# Patient Record
Sex: MALE | Race: ASIAN | Employment: FULL TIME | URBAN - METROPOLITAN AREA
[De-identification: names, ages, dates, MRNs, and addresses within clinical notes are randomized per-mention and may not be internally consistent; named-entity substitution may affect disease eponyms.]

---

## 2017-01-30 DIAGNOSIS — I10 ESSENTIAL HYPERTENSION: ICD-10-CM

## 2017-01-30 RX ORDER — LOSARTAN POTASSIUM 50 MG/1
50 TABLET ORAL DAILY
Qty: 90 TAB | Refills: 3 | Status: SHIPPED | OUTPATIENT
Start: 2017-01-30 | End: 2018-02-01 | Stop reason: SDUPTHER

## 2017-03-08 ENCOUNTER — OFFICE VISIT (OUTPATIENT)
Dept: INTERNAL MEDICINE CLINIC | Age: 47
End: 2017-03-08

## 2017-03-08 VITALS
HEART RATE: 78 BPM | SYSTOLIC BLOOD PRESSURE: 129 MMHG | TEMPERATURE: 98 F | RESPIRATION RATE: 18 BRPM | WEIGHT: 192.2 LBS | OXYGEN SATURATION: 95 % | DIASTOLIC BLOOD PRESSURE: 80 MMHG | HEIGHT: 66 IN | BODY MASS INDEX: 30.89 KG/M2

## 2017-03-08 DIAGNOSIS — E78.00 HYPERCHOLESTEREMIA: Primary | ICD-10-CM

## 2017-03-08 DIAGNOSIS — K21.9 GASTROESOPHAGEAL REFLUX DISEASE WITHOUT ESOPHAGITIS: ICD-10-CM

## 2017-03-08 NOTE — MR AVS SNAPSHOT
Visit Information Date & Time Provider Department Dept. Phone Encounter #  
 3/8/2017  8:45 AM Tatyana Harrington, [address] [phone] [id number] Follow-up Instructions Return in about 6 months (around 9/8/2017) for HTN. Follow-up and Disposition History Upcoming Health Maintenance Date Due DTaP/Tdap/Td series (2 - Td) 3/3/2026 Allergies as of 3/8/2017  Review Complete On: 3/8/2017 By: Tatyana Harrington MD  
 No Known Allergies Current Immunizations  Reviewed on 3/8/2017 Name Date Influenza Vaccine 9/15/2016, 10/1/2015 Influenza Vaccine PF 9/29/2014  9:24 AM  
 TD Vaccine 9/5/2010  1:00 AM  
 Tdap 3/3/2016 Reviewed by Tatyana Harrington MD on 3/8/2017 at  9:18 AM  
You Were Diagnosed With   
  
 Codes Comments Hypercholesteremia    -  Primary ICD-10-CM: E78.00 ICD-9-CM: 272.0 Gastroesophageal reflux disease without esophagitis     ICD-10-CM: K21.9 ICD-9-CM: 530.81 Vitals BP Pulse Temp Resp Height(growth percentile) Weight(growth percentile) 129/80 (BP 1 Location: Left arm, BP Patient Position: Sitting) 78 98 °F (36.7 °C) (Oral) 18 5' 6\" (1.676 m) 192 lb 3.2 oz (87.2 kg) SpO2 BMI Smoking Status 95% 31.02 kg/m2 Never Smoker Vitals History BMI and BSA Data Body Mass Index Body Surface Area 31.02 kg/m 2 2.02 m 2 Preferred Pharmacy Pharmacy Name Phone [id number] [phone] Your Updated Medication List  
  
   
This list is accurate as of: 3/8/17  9:24 AM.  Always use your most recent med list.  
  
  
  
  
 losartan 50 mg tablet Commonly known as:  COZAAR Take 1 Tab by mouth daily. MULTIPLE VITAMINS PO Take  by mouth. One daily RETIN-A 0.01 % topical gel Generic drug:  tretinoin Apply  to affected area nightly. Follow-up Instructions Return in about 6 months (around 9/8/2017) for HTN. Patient Instructions Learning About Healthy Weight What is a healthy weight? A healthy weight is the weight at which you feel good about yourself and have energy for work and play. It's also one that lowers your risk for health problems. What can you do to stay at a healthy weight? It can be hard to stay at a healthy weight, especially when fast food, vending-machine snacks, and processed foods are so easy to find. And with your busy lifestyle, activity may be low on your list of things to do. But staying at a healthy weight may be easier than you think. Here are some dos and don'ts for staying at a healthy weight: 
Do eat healthy foods The kinds of foods you eat have a big impact on both your weight and your health. Reaching and staying at a healthy weight is not about going on a diet. It's about making healthier food choices every day and changing your diet for good. Healthy eating means eating a variety of foods so that you get all the nutrients you need. Your body needs protein, carbohydrate, and fats for energy. They keep your heart beating, your brain active, and your muscles working. On most days, try to eat from each food group. This means eating a variety of: · Whole grains, such as whole wheat breads and pastas. · Fruits and vegetables. · Dairy products, such as low-fat milk, yogurt, and cheese. · Lean proteins, such as all types of fish, chicken without the skin, and beans. Don't have too much or too little of one thing. All foods, if eaten in moderation, can be part of healthy eating. Even sweets can be okay. If your favorite foods are high in fat, salt, sugar, or calories, limit how often you eat them. Eat smaller servings, or look for healthy substitutes. Do watch what you eat Many people eat more than their bodies need.  Part of staying at a healthy weight means learning how much food you really need from day to day and not eating more than that. Even with healthy foods, eating too much can make you gain weight. Having a well-balanced diet means that you eat enough, but not too much, and that your food gives you the nutrients you need to stay healthy. So listen to your body. Eat when you're hungry. Stop when you feel satisfied. It's a good idea to have healthy snacks ready for when you get hungry. Keep healthy snacks with you at work, in your car, and at home. If you have a healthy snack easily available, you'll be less likely to pick a candy bar or bag of chips from a vending machine instead. Some healthy snacks you might want to keep on hand are fruit, low-fat yogurt, string cheese, low-fat microwave popcorn, raisins and other dried fruit, nuts, whole wheat crackers, pretzels, carrots, celery sticks, and broccoli. Do some physical activity A big part of reaching and staying at a healthy weight is being active. When you're active, you burn calories. This makes it easier to reach and stay at a healthy weight. When you're active on a regular basis, your body burns more calories, even when you're at rest. Being active helps you lose fat and build lean muscle. Try to be active for at least 1 hour every day. This may sound like a lot, but it's okay to be active in smaller blocks of time that add up to 1 hour a day. Any activity that makes your heart beat faster and keeps it there for a while counts. A brisk walk, run, or swim will get your heart beating faster. So will climbing stairs, shooting baskets, or cycling. Even some household chores like vacuuming and mowing the lawn will get your heart rate up. Pick activities that you enjoyones that make your heart beat faster, your muscles stronger, and your muscles and joints more flexible. If you find more than one thing you like doing, do them all. You don't have to do the same thing every day. Don't diet Diets don't work. Diets are temporary. Because you give up so much when you diet, you may be hungry and think about food all the time. And after you stop dieting, you also may overeat to make up for what you missed. Most people who diet end up gaining back the pounds they lostand more. Remember that healthy bodies come in lots of shapes and sizes. Everyone can get healthier by eating better and being more active. Where can you learn more? Go to http://erlin-neha.info/. Enter 449 1460 in the search box to learn more about \"Learning About Healthy Weight. \" Current as of: February 16, 2016 Content Version: 11.1 © 4802-9172 [employer]. Care instructions adapted under license by [employer] (which disclaims liability or warranty for this information). If you have questions about a medical condition or this instruction, always ask your healthcare professional. [place] [address] any warranty or liability for your use of this information. Introducing \[facility]\"" & HEALTH SERVICES! Dear Fred Malloy: Thank you for requesting a [email or web address] account. Our records indicate that you already have an active [email or web address] account. You can access your account anytime at [email or web address] Did you know that you can access your hospital and ER discharge instructions at any time in [email or web address]? You can also review all of your test results from your hospital stay or ER visit. Additional Information If you have questions, please visit the Frequently Asked Questions section of the [email or web address] website at [email or web address] Remember, [email or web address] is NOT to be used for urgent needs. For medical emergencies, dial 911. Now available from your iPhone and Android! Please provide this summary of care documentation to your next provider. Your primary care clinician is listed as [place].  If you have any questions after today's visit, please call [phone].

## 2017-03-08 NOTE — PROGRESS NOTES
SUBJECTIVE:   Mr. Nathan Kevin is a 55 y.o. male who is here for follow up of routine medical issues. Chief Complaint   Patient presents with    Hypertension    Follow-up     pt here today for 6 month f.u    Pelvic Pain     pt c.o pain to R side of pelvic x 1 month--osteoarthritis, pt did get a cortisone shot a year ago, but pain is returning       \"I just moved to a new house, doing a lot of labor--unpacking. \"  \"I don't know if I pulled something. I do have osteoarthritis there. \"  Worse in morning, worse with arising after being seated for a while. Takes ibuprofen 1-2 times a day. At this time, he is otherwise doing well and has brought no other complaints to my attention today. For a list of the medical issues addressed today, see the assessment and plan below. PMH:   Past Medical History:   Diagnosis Date    Bursitis of shoulder     right; \"comes and goes\"    GERD (gastroesophageal reflux disease)     Hypercholesteremia     Osteoarthritis        PSH:   Past Surgical History:   Procedure Laterality Date    HX ACL RECONSTRUCTION      HX HERNIA REPAIR      HX SHOULDER ARTHROSCOPY  3/01/2012    Right shoulder     HX SHOULDER ARTHROSCOPY  04/2014    R shoulder        All: He has No Known Allergies. MEDS:   Current Outpatient Prescriptions   Medication Sig    losartan (COZAAR) 50 mg tablet Take 1 Tab by mouth daily.  tretinoin (RETIN-A) 0.01 % topical gel Apply  to affected area nightly.  MULTIVITAMINS (MULTIPLE VITAMINS PO) Take  by mouth. One daily      No current facility-administered medications for this visit. FH: Father has a hematologic disorder. Mother has sciatica. Two sisters alive and well. SH: . He reports that he has never smoked. He does not have any smokeless tobacco history on file. He reports that he does not drink alcohol. ROS: See above; Complete ROS otherwise negative.      OBJECTIVE:   Vitals:   Visit Vitals    /80 (BP 1 Location: Left arm, BP Patient Position: Sitting)    Pulse 78    Temp 98 °F (36.7 °C) (Oral)    Resp 18    Ht 5' 6\" (1.676 m)    Wt 192 lb 3.2 oz (87.2 kg)    SpO2 95%    BMI 31.02 kg/m2      Gen: Pleasant 55 y.o.  male in NAD. HEENT: PERRLA. EOMI. OP moist and pink. Neck: Supple. No LAD. HEART: RRR, No M/G/R.    LUNGS: CTAB No W/R. ABDOMEN: S, NT, ND, BS+. EXTREMITIES: Warm. No C/C/E.  MUSCULOSKELETAL: Normal ROM, muscle strength 5/5 all groups. R knee crepitus with flexion and extension. NEURO: Alert and oriented x 3. Cranial nerves grossly intact. No focal sensory or motor deficits noted. SKIN: Warm. Dry. No rashes or other lesions noted. ASSESSMENT/ PLAN:   1. R groin pain: Likely pulled muscle. No bulge appreciated. For now, rest and NSAIDs. If fails to improve or worsens, let us know. 2. Hypertension: Controlled. 3. Lipid screening / Hypercholesterolemia: Due for recheck. - METABOLIC PANEL, COMPREHENSIVE  - LIPID PANEL  4. GERD (gastroesophageal reflux disease): Currently quiescent. Off meds. 5. Weight: Diet and exercise. I have reviewed the patient's medications and risks/side effects/benefits were discussed. Diagnosis(-es) explained to patient and questions answered. Literature provided where appropriate. Follow-up Disposition:  Return in about 6 months (around 9/8/2017) for HTN.

## 2017-03-08 NOTE — PATIENT INSTRUCTIONS
Learning About Healthy Weight  What is a healthy weight? A healthy weight is the weight at which you feel good about yourself and have energy for work and play. It's also one that lowers your risk for health problems. What can you do to stay at a healthy weight? It can be hard to stay at a healthy weight, especially when fast food, vending-machine snacks, and processed foods are so easy to find. And with your busy lifestyle, activity may be low on your list of things to do. But staying at a healthy weight may be easier than you think. Here are some dos and don'ts for staying at a healthy weight:  Do eat healthy foods  The kinds of foods you eat have a big impact on both your weight and your health. Reaching and staying at a healthy weight is not about going on a diet. It's about making healthier food choices every day and changing your diet for good. Healthy eating means eating a variety of foods so that you get all the nutrients you need. Your body needs protein, carbohydrate, and fats for energy. They keep your heart beating, your brain active, and your muscles working. On most days, try to eat from each food group. This means eating a variety of:  · Whole grains, such as whole wheat breads and pastas. · Fruits and vegetables. · Dairy products, such as low-fat milk, yogurt, and cheese. · Lean proteins, such as all types of fish, chicken without the skin, and beans. Don't have too much or too little of one thing. All foods, if eaten in moderation, can be part of healthy eating. Even sweets can be okay. If your favorite foods are high in fat, salt, sugar, or calories, limit how often you eat them. Eat smaller servings, or look for healthy substitutes. Do watch what you eat  Many people eat more than their bodies need. Part of staying at a healthy weight means learning how much food you really need from day to day and not eating more than that.  Even with healthy foods, eating too much can make you gain weight. Having a well-balanced diet means that you eat enough, but not too much, and that your food gives you the nutrients you need to stay healthy. So listen to your body. Eat when you're hungry. Stop when you feel satisfied. It's a good idea to have healthy snacks ready for when you get hungry. Keep healthy snacks with you at work, in your car, and at home. If you have a healthy snack easily available, you'll be less likely to pick a candy bar or bag of chips from a vending machine instead. Some healthy snacks you might want to keep on hand are fruit, low-fat yogurt, string cheese, low-fat microwave popcorn, raisins and other dried fruit, nuts, whole wheat crackers, pretzels, carrots, celery sticks, and broccoli. Do some physical activity  A big part of reaching and staying at a healthy weight is being active. When you're active, you burn calories. This makes it easier to reach and stay at a healthy weight. When you're active on a regular basis, your body burns more calories, even when you're at rest. Being active helps you lose fat and build lean muscle. Try to be active for at least 1 hour every day. This may sound like a lot, but it's okay to be active in smaller blocks of time that add up to 1 hour a day. Any activity that makes your heart beat faster and keeps it there for a while counts. A brisk walk, run, or swim will get your heart beating faster. So will climbing stairs, shooting baskets, or cycling. Even some household chores like vacuuming and mowing the lawn will get your heart rate up. Pick activities that you enjoyones that make your heart beat faster, your muscles stronger, and your muscles and joints more flexible. If you find more than one thing you like doing, do them all. You don't have to do the same thing every day. Don't diet  Diets don't work. Diets are temporary. Because you give up so much when you diet, you may be hungry and think about food all the time.  And after you stop dieting, you also may overeat to make up for what you missed. Most people who diet end up gaining back the pounds they lostand more. Remember that healthy bodies come in lots of shapes and sizes. Everyone can get healthier by eating better and being more active. Where can you learn more? Go to http://erlin-neha.info/. Enter 738 5060 in the search box to learn more about \"Learning About Healthy Weight. \"  Current as of: February 16, 2016  Content Version: 11.1  © 3470-7159 [employer]. Care instructions adapted under license by [employer] (which disclaims liability or warranty for this information). If you have questions about a medical condition or this instruction, always ask your healthcare professional. [address] any warranty or liability for your use of this information.

## 2017-03-08 NOTE — PROGRESS NOTES
1. Have you been to the ER, urgent care clinic since your last visit? Hospitalized since your last visit?no    2. Have you seen or consulted any other health care providers outside of the [address] [place] [address] since your last visit? Include any pap smears or colon screening.  no

## 2017-09-08 ENCOUNTER — OFFICE VISIT (OUTPATIENT)
Dept: INTERNAL MEDICINE CLINIC | Age: 47
End: 2017-09-08

## 2017-09-08 VITALS
RESPIRATION RATE: 18 BRPM | DIASTOLIC BLOOD PRESSURE: 71 MMHG | HEART RATE: 76 BPM | BODY MASS INDEX: 30.15 KG/M2 | HEIGHT: 66 IN | SYSTOLIC BLOOD PRESSURE: 116 MMHG | OXYGEN SATURATION: 97 % | TEMPERATURE: 97.2 F | WEIGHT: 187.6 LBS

## 2017-09-08 DIAGNOSIS — E78.00 HYPERCHOLESTEREMIA: ICD-10-CM

## 2017-09-08 DIAGNOSIS — M54.12 CERVICAL RADICULOPATHY: Primary | ICD-10-CM

## 2017-09-08 DIAGNOSIS — I10 ESSENTIAL HYPERTENSION: ICD-10-CM

## 2017-09-08 DIAGNOSIS — K21.9 GASTROESOPHAGEAL REFLUX DISEASE WITHOUT ESOPHAGITIS: ICD-10-CM

## 2017-09-08 NOTE — PATIENT INSTRUCTIONS
Pinched Nerve in the Neck: Care Instructions  Your Care Instructions  A pinched nerve in the neck happens when a vertebra or disc in the upper part of your spine is damaged. This damage can happen because of an injury. Or it can just happen with age. The changes caused by the damage may put pressure on a nearby nerve root, pinching it. This causes symptoms such as sharp pain in your neck, shoulder, arm, or back. You may also have tingling or numbness. Sometimes it makes your arm weaker. The symptoms are usually worse when you turn your head or strain your neck. For many people, the symptoms get better over time and finally go away. Early treatment usually includes medicines for pain and swelling. Sometimes physical therapy and special exercises may help. Follow-up care is a key part of your treatment and safety. Be sure to make and go to all appointments, and call your doctor if you are having problems. It's also a good idea to know your test results and keep a list of the medicines you take. How can you care for yourself at home? · Be safe with medicines. Read and follow all instructions on the label. ¨ If the doctor gave you a prescription medicine for pain, take it as prescribed. ¨ If you are not taking a prescription pain medicine, ask your doctor if you can take an over-the-counter medicine. · Try using a heating pad on a low or medium setting for 15 to 20 minutes every 2 or 3 hours. Try a warm shower in place of one session with the heating pad. You can also buy single-use heat wraps that last up to 8 hours. · You can also try an ice pack for 10 to 15 minutes every 2 to 3 hours. There isn't strong evidence that either heat or ice will help. But you can try them to see if they help you. · Don't spend too long in one position. Take short breaks to move around and change positions. · Wear a seat belt and shoulder harness when you are in a car.   · Sleep with a pillow under your head and neck that keeps your neck straight. · If you were given a neck brace (cervical collar) to limit neck motion, wear it as instructed for as many days as your doctor tells you to. Do not wear it longer than you were told to. Wearing a brace for too long can lead to neck stiffness and can weaken the neck muscles. · Follow your doctor's instructions for gentle neck-stretching exercises. · Do not smoke. Smoking can slow healing of your discs. If you need help quitting, talk to your doctor about stop-smoking programs and medicines. These can increase your chances of quitting for good. · Avoid strenuous work or exercise until your doctor says it is okay. When should you call for help? Call 911 anytime you think you may need emergency care. For example, call if:  · You are unable to move an arm or a leg at all. Call your doctor now or seek immediate medical care if:  · You have new or worse symptoms in your arms, legs, chest, belly, or buttocks. Symptoms may include:  ¨ Numbness or tingling. ¨ Weakness. ¨ Pain. · You lose bladder or bowel control. Watch closely for changes in your health, and be sure to contact your doctor if:  · You are not getting better as expected. Where can you learn more? Go to http://relin-neha.info/. Enter D487 in the search box to learn more about \"Pinched Nerve in the Neck: Care Instructions. \"  Current as of: March 21, 2017  Content Version: 11.3  © 5917-7280 [employer]. Care instructions adapted under license by [employer] (which disclaims liability or warranty for this information). If you have questions about a medical condition or this instruction, always ask your healthcare professional. [place] [address] any warranty or liability for your use of this information.

## 2017-09-08 NOTE — PROGRESS NOTES
1. Have you been to the ER, urgent care clinic since your last visit? Hospitalized since your last visit?no    2. Have you seen or consulted any other health care providers outside of the [address] [place] [address] since your last visit? Include any pap smears or colon screening.  no

## 2017-09-08 NOTE — PROGRESS NOTES
SUBJECTIVE:   Mr. Kam Silver is a 55 y.o. male who is here for follow up of routine medical issues. Chief Complaint   Patient presents with    Complete Physical     pt states that he is here today for annual check up    Immunization/Injection     pt will get a flu shot at a later date       L arm \"gets tingly, from shoulder all the way to the wrist; on its own. \"  Comes and goes, \"sporadic. \"    At this time, he is otherwise doing well and has brought no other complaints to my attention today. For a list of the medical issues addressed today, see the assessment and plan below. PMH:   Past Medical History:   Diagnosis Date    Bursitis of shoulder     right; \"comes and goes\"    GERD (gastroesophageal reflux disease)     Hypercholesteremia     Osteoarthritis        PSH:   Past Surgical History:   Procedure Laterality Date    HX ACL RECONSTRUCTION      HX HERNIA REPAIR      HX SHOULDER ARTHROSCOPY  3/01/2012    Right shoulder     HX SHOULDER ARTHROSCOPY  04/2014    R shoulder        All: He has No Known Allergies. MEDS:   Current Outpatient Prescriptions   Medication Sig    losartan (COZAAR) 50 mg tablet Take 1 Tab by mouth daily.  tretinoin (RETIN-A) 0.01 % topical gel Apply  to affected area nightly.  MULTIVITAMINS (MULTIPLE VITAMINS PO) Take  by mouth. One daily      No current facility-administered medications for this visit. FH: Father has a hematologic disorder. Mother has sciatica. Two sisters alive and well. SH: . Lifts weight. He reports that he has never smoked. He does not have any smokeless tobacco history on file. He reports that he does not drink alcohol. ROS: See above; Complete ROS otherwise negative.      OBJECTIVE:   Vitals:   Visit Vitals    /71 (BP 1 Location: Left arm, BP Patient Position: Sitting)    Pulse 76    Temp 97.2 °F (36.2 °C) (Oral)    Resp 18    Ht 5' 6\" (1.676 m)    Wt 187 lb 9.6 oz (85.1 kg)    SpO2 97%    BMI 30.28 kg/m2      Gen: Pleasant 55 y.o.  male in NAD. HEENT: PERRLA. EOMI. OP moist and pink. Neck: Supple. No LAD. HEART: RRR, No M/G/R.    LUNGS: CTAB No W/R. ABDOMEN: S, NT, ND, BS+. EXTREMITIES: Warm. No C/C/E.  MUSCULOSKELETAL: Normal ROM, muscle strength 5/5 all groups. R knee crepitus with flexion and extension. NEURO: Alert and oriented x 3. Cranial nerves grossly intact. No focal sensory or motor deficits noted. SKIN: Warm. Dry. No rashes or other lesions noted. ASSESSMENT/ PLAN:   1. Cervical radiculopathy: New. For, now, conservative treatment. 2. Hypertension: Controlled. 3. Lipid screening / Hypercholesterolemia: Due for recheck. - METABOLIC PANEL, COMPREHENSIVE  - LIPID PANEL  4. GERD (gastroesophageal reflux disease): Currently quiescent. Off meds. 5. Weight: Diet and exercise. I have reviewed the patient's medications and risks/side effects/benefits were discussed. Diagnosis(-es) explained to patient and questions answered. Literature provided where appropriate. Follow-up Disposition:  Return in about 6 months (around 3/8/2018) for HTN.

## 2017-09-08 NOTE — MR AVS SNAPSHOT
Visit Information Date & Time Provider Department Dept. Phone Encounter #  
 9/8/2017  8:45 AM Marleny Frank, [address] [place] [address],[address] [phone] [id number] Follow-up Instructions Return in about 6 months (around 3/8/2018) for HTN. Upcoming Health Maintenance Date Due INFLUENZA AGE 9 TO ADULT 8/1/2017 DTaP/Tdap/Td series (2 - Td) 3/3/2026 Allergies as of 9/8/2017  Review Complete On: 9/8/2017 By: Marleny Frank MD  
 No Known Allergies Current Immunizations  Reviewed on 3/8/2017 Name Date Influenza Vaccine 9/15/2016, 10/1/2015 Influenza Vaccine PF 9/29/2014  9:24 AM  
 TD Vaccine 9/5/2010  1:00 AM  
 Tdap 3/3/2016 Not reviewed this visit You Were Diagnosed With   
  
 Codes Comments Cervical radiculopathy    -  Primary ICD-10-CM: M54.12 
ICD-9-CM: 723.4 Gastroesophageal reflux disease without esophagitis     ICD-10-CM: K21.9 ICD-9-CM: 530.81 Hypercholesteremia     ICD-10-CM: E78.00 ICD-9-CM: 272.0 Essential hypertension     ICD-10-CM: I10 
ICD-9-CM: 401.9 Vitals BP Pulse Temp Resp Height(growth percentile) Weight(growth percentile) 116/71 (BP 1 Location: Left arm, BP Patient Position: Sitting) 76 97.2 °F (36.2 °C) (Oral) 18 5' 6\" (1.676 m) 187 lb 9.6 oz (85.1 kg) SpO2 BMI Smoking Status 97% 30.28 kg/m2 Never Smoker Vitals History BMI and BSA Data Body Mass Index Body Surface Area  
 30.28 kg/m 2 1.99 m 2 Preferred Pharmacy Pharmacy Name Phone [phone] [phone] Your Updated Medication List  
  
   
This list is accurate as of: 9/8/17  9:21 AM.  Always use your most recent med list.  
  
  
  
  
 losartan 50 mg tablet Commonly known as:  COZAAR Take 1 Tab by mouth daily. MULTIPLE VITAMINS PO Take  by mouth. One daily RETIN-A 0.01 % topical gel Generic drug:  tretinoin Apply  to affected area nightly. We Performed the Following CBC WITH AUTOMATED DIFF [17329 CPT(R)] LIPID PANEL [17975 CPT(R)] METABOLIC PANEL, COMPREHENSIVE [96486 CPT(R)] Follow-up Instructions Return in about 6 months (around 3/8/2018) for HTN. Patient Instructions Pinched Nerve in the Neck: Care Instructions Your Care Instructions A pinched nerve in the neck happens when a vertebra or disc in the upper part of your spine is damaged. This damage can happen because of an injury. Or it can just happen with age. The changes caused by the damage may put pressure on a nearby nerve root, pinching it. This causes symptoms such as sharp pain in your neck, shoulder, arm, or back. You may also have tingling or numbness. Sometimes it makes your arm weaker. The symptoms are usually worse when you turn your head or strain your neck. For many people, the symptoms get better over time and finally go away. Early treatment usually includes medicines for pain and swelling. Sometimes physical therapy and special exercises may help. Follow-up care is a key part of your treatment and safety. Be sure to make and go to all appointments, and call your doctor if you are having problems. It's also a good idea to know your test results and keep a list of the medicines you take. How can you care for yourself at home? · Be safe with medicines. Read and follow all instructions on the label. ¨ If the doctor gave you a prescription medicine for pain, take it as prescribed. ¨ If you are not taking a prescription pain medicine, ask your doctor if you can take an over-the-counter medicine. · Try using a heating pad on a low or medium setting for 15 to 20 minutes every 2 or 3 hours. Try a warm shower in place of one session with the heating pad. You can also buy single-use heat wraps that last up to 8 hours. · You can also try an ice pack for 10 to 15 minutes every 2 to 3 hours. There isn't strong evidence that either heat or ice will help. But you can try them to see if they help you. · Don't spend too long in one position. Take short breaks to move around and change positions. · Wear a seat belt and shoulder harness when you are in a car. · Sleep with a pillow under your head and neck that keeps your neck straight. · If you were given a neck brace (cervical collar) to limit neck motion, wear it as instructed for as many days as your doctor tells you to. Do not wear it longer than you were told to. Wearing a brace for too long can lead to neck stiffness and can weaken the neck muscles. · Follow your doctor's instructions for gentle neck-stretching exercises. · Do not smoke. Smoking can slow healing of your discs. If you need help quitting, talk to your doctor about stop-smoking programs and medicines. These can increase your chances of quitting for good. · Avoid strenuous work or exercise until your doctor says it is okay. When should you call for help? Call 911 anytime you think you may need emergency care. For example, call if: 
· You are unable to move an arm or a leg at all. Call your doctor now or seek immediate medical care if: 
· You have new or worse symptoms in your arms, legs, chest, belly, or buttocks. Symptoms may include: ¨ Numbness or tingling. ¨ Weakness. ¨ Pain. · You lose bladder or bowel control. Watch closely for changes in your health, and be sure to contact your doctor if: 
· You are not getting better as expected. Where can you learn more? Go to http://erlin-neha.info/. Enter A957 in the search box to learn more about \"Pinched Nerve in the Neck: Care Instructions. \" Current as of: March 21, 2017 Content Version: 11.3 © 7332-3329 [employer], [employer].  Care instructions adapted under license by [employer]zworld (which disclaims liability or warranty for this information). If you have questions about a medical condition or this instruction, always ask your healthcare professional. [address] any warranty or liability for your use of this information. Introducing hospitals & HEALTH SERVICES! Dear Jaden Wilks: Thank you for requesting a [employer] account. Our records indicate that you already have an active [employer] account. You can access your account anytime at [email or web address] People Did you know that you can access your hospital and ER discharge instructions at any time in Fast Orientation? You can also review all of your test results from your hospital stay or ER visit. Additional Information If you have questions, please visit the Frequently Asked Questions section of the [employer] website at [email or web address] Remember, [employer] is NOT to be used for urgent needs. For medical emergencies, dial 911. Now available from your iPhone and Android! Please provide this summary of care documentation to your next provider. Your primary care clinician is listed as [place]. If you have any questions after today's visit, please call [phone].

## 2017-09-09 LAB
ALBUMIN SERPL-MCNC: 4.2 G/DL (ref 3.5–5.5)
ALBUMIN/GLOB SERPL: 1.7 {RATIO} (ref 1.2–2.2)
ALP SERPL-CCNC: 67 IU/L (ref 39–117)
ALT SERPL-CCNC: 20 IU/L (ref 0–44)
AST SERPL-CCNC: 18 IU/L (ref 0–40)
BASOPHILS # BLD AUTO: 0 X10E3/UL (ref 0–0.2)
BASOPHILS NFR BLD AUTO: 1 %
BILIRUB SERPL-MCNC: 1.6 MG/DL (ref 0–1.2)
BUN SERPL-MCNC: 9 MG/DL (ref 6–24)
BUN/CREAT SERPL: 8 (ref 9–20)
CALCIUM SERPL-MCNC: 9.8 MG/DL (ref 8.7–10.2)
CHLORIDE SERPL-SCNC: 103 MMOL/L (ref 96–106)
CHOLEST SERPL-MCNC: 214 MG/DL (ref 100–199)
CO2 SERPL-SCNC: 24 MMOL/L (ref 18–29)
CREAT SERPL-MCNC: 1.09 MG/DL (ref 0.76–1.27)
EOSINOPHIL # BLD AUTO: 0.3 X10E3/UL (ref 0–0.4)
EOSINOPHIL NFR BLD AUTO: 8 %
ERYTHROCYTE [DISTWIDTH] IN BLOOD BY AUTOMATED COUNT: 13.7 % (ref 12.3–15.4)
GLOBULIN SER CALC-MCNC: 2.5 G/DL (ref 1.5–4.5)
GLUCOSE SERPL-MCNC: 84 MG/DL (ref 65–99)
HCT VFR BLD AUTO: 45.1 % (ref 37.5–51)
HDLC SERPL-MCNC: 47 MG/DL
HGB BLD-MCNC: 15.4 G/DL (ref 12.6–17.7)
IMM GRANULOCYTES # BLD: 0 X10E3/UL (ref 0–0.1)
IMM GRANULOCYTES NFR BLD: 0 %
LDLC SERPL CALC-MCNC: 117 MG/DL (ref 0–99)
LYMPHOCYTES # BLD AUTO: 1.4 X10E3/UL (ref 0.7–3.1)
LYMPHOCYTES NFR BLD AUTO: 34 %
MCH RBC QN AUTO: 30.1 PG (ref 26.6–33)
MCHC RBC AUTO-ENTMCNC: 34.1 G/DL (ref 31.5–35.7)
MCV RBC AUTO: 88 FL (ref 79–97)
MONOCYTES # BLD AUTO: 0.4 X10E3/UL (ref 0.1–0.9)
MONOCYTES NFR BLD AUTO: 9 %
NEUTROPHILS # BLD AUTO: 1.9 X10E3/UL (ref 1.4–7)
NEUTROPHILS NFR BLD AUTO: 48 %
PLATELET # BLD AUTO: 254 X10E3/UL (ref 150–379)
POTASSIUM SERPL-SCNC: 4.3 MMOL/L (ref 3.5–5.2)
PROT SERPL-MCNC: 6.7 G/DL (ref 6–8.5)
RBC # BLD AUTO: 5.12 X10E6/UL (ref 4.14–5.8)
SODIUM SERPL-SCNC: 143 MMOL/L (ref 134–144)
TRIGL SERPL-MCNC: 250 MG/DL (ref 0–149)
VLDLC SERPL CALC-MCNC: 50 MG/DL (ref 5–40)
WBC # BLD AUTO: 4 X10E3/UL (ref 3.4–10.8)

## 2017-09-12 ENCOUNTER — TELEPHONE (OUTPATIENT)
Dept: INTERNAL MEDICINE CLINIC | Age: 47
End: 2017-09-12

## 2017-09-18 ENCOUNTER — DOCUMENTATION ONLY (OUTPATIENT)
Dept: INTERNAL MEDICINE CLINIC | Age: 47
End: 2017-09-18

## 2017-09-18 NOTE — PROGRESS NOTES
Pt's spouse dropped off health screening form. Turnaround time and potential fee were discussed. Form was placed in Dr. Worthington Payer box.

## 2017-09-21 ENCOUNTER — TELEPHONE (OUTPATIENT)
Dept: INTERNAL MEDICINE CLINIC | Age: 47
End: 2017-09-21

## 2018-02-01 DIAGNOSIS — I10 ESSENTIAL HYPERTENSION: ICD-10-CM

## 2018-02-01 RX ORDER — LOSARTAN POTASSIUM 50 MG/1
TABLET ORAL
Qty: 90 TAB | Refills: 3 | Status: SHIPPED | OUTPATIENT
Start: 2018-02-01 | End: 2019-01-17 | Stop reason: SDUPTHER

## 2018-03-09 ENCOUNTER — OFFICE VISIT (OUTPATIENT)
Dept: INTERNAL MEDICINE CLINIC | Age: 48
End: 2018-03-09

## 2018-03-09 VITALS
BODY MASS INDEX: 30.12 KG/M2 | HEART RATE: 81 BPM | TEMPERATURE: 98.8 F | SYSTOLIC BLOOD PRESSURE: 131 MMHG | RESPIRATION RATE: 16 BRPM | OXYGEN SATURATION: 94 % | DIASTOLIC BLOOD PRESSURE: 82 MMHG | HEIGHT: 66 IN | WEIGHT: 187.4 LBS

## 2018-03-09 DIAGNOSIS — K21.9 GASTROESOPHAGEAL REFLUX DISEASE WITHOUT ESOPHAGITIS: ICD-10-CM

## 2018-03-09 DIAGNOSIS — E78.00 HYPERCHOLESTEREMIA: ICD-10-CM

## 2018-03-09 DIAGNOSIS — I10 ESSENTIAL HYPERTENSION: Primary | ICD-10-CM

## 2018-03-09 NOTE — PROGRESS NOTES
SUBJECTIVE:   Mr. Leann Reese is a 52 y.o. male who is here for follow up of routine medical issues. Chief Complaint   Patient presents with    Hypertension     pt here today for 6 month f.u       L arm neuropathy has resolved. At this time, he is otherwise doing well and has brought no other complaints to my attention today. For a list of the medical issues addressed today, see the assessment and plan below. PMH:   Past Medical History:   Diagnosis Date    Bursitis of shoulder     right; \"comes and goes\"    Essential hypertension 9/8/2017    GERD (gastroesophageal reflux disease)     Hypercholesteremia     Osteoarthritis        PSH:   Past Surgical History:   Procedure Laterality Date    HX ACL RECONSTRUCTION      HX HERNIA REPAIR      HX OTHER SURGICAL      INGROWN TOE NAIL REMOVED OFF L big toe     HX SHOULDER ARTHROSCOPY  3/01/2012    Right shoulder     HX SHOULDER ARTHROSCOPY  04/2014    R shoulder        All: He has No Known Allergies. MEDS:   Current Outpatient Prescriptions   Medication Sig    losartan (COZAAR) 50 mg tablet TAKE 1 TABLET DAILY    tretinoin (RETIN-A) 0.01 % topical gel Apply  to affected area nightly.  MULTIVITAMINS (MULTIPLE VITAMINS PO) Take  by mouth. One daily      No current facility-administered medications for this visit. FH: Father has a hematologic disorder. Mother has sciatica. Two sisters alive and well. SH: . Lifts weight. He reports that he has never smoked. He has never used smokeless tobacco. He reports that he does not drink alcohol. ROS: See above; Complete ROS otherwise negative. OBJECTIVE:   Vitals:   Visit Vitals    /82 (BP 1 Location: Left arm, BP Patient Position: Sitting)    Pulse 81    Temp 98.8 °F (37.1 °C) (Oral)    Resp 16    Ht 5' 6\" (1.676 m)    Wt 187 lb 6.4 oz (85 kg)    SpO2 94%    BMI 30.25 kg/m2      Gen: Pleasant 52 y.o.  male in NAD. HEENT: PERRLA. EOMI.  OP moist and pink.  Neck: Supple. No LAD. HEART: RRR, No M/G/R.    LUNGS: CTAB No W/R. ABDOMEN: S, NT, ND, BS+. EXTREMITIES: Warm. No C/C/E.  MUSCULOSKELETAL: Normal ROM, muscle strength 5/5 all groups. R knee crepitus with flexion and extension. NEURO: Alert and oriented x 3. Cranial nerves grossly intact. No focal sensory or motor deficits noted. SKIN: Warm. Dry. No rashes or other lesions noted. ASSESSMENT/ PLAN:   1. Cervical radiculopathy: New. For, now, conservative treatment. 2. Hypertension: Controlled. 3. Lipid screening / Hypercholesterolemia: Due for recheck. - METABOLIC PANEL, COMPREHENSIVE  - LIPID PANEL  4. GERD (gastroesophageal reflux disease): Currently quiescent. Off meds. 5. Weight: Diet and exercise. I have reviewed the patient's medications and risks/side effects/benefits were discussed. Diagnosis(-es) explained to patient and questions answered. Literature provided where appropriate.      Follow-up Disposition: Not on File

## 2018-03-09 NOTE — PROGRESS NOTES
1. Have you been to the ER, urgent care clinic since your last visit? Hospitalized since your last visit?no    2. Have you seen or consulted any other health care providers outside of the [address] [place] [address] since your last visit? Include any pap smears or colon screening.  no

## 2018-03-09 NOTE — MR AVS SNAPSHOT
[address] [facility] 
[phone] Patient: Isael Krishnamurthy MRN: [id number] :1970 Visit Information Date & Time Provider Department Dept. Phone Encounter #  
 3/9/2018 11:30 AM Pineda Leone, [address] [phone] [id number] Upcoming Health Maintenance Date Due DTaP/Tdap/Td series (2 - Td) 3/3/2026 Allergies as of 3/9/2018  Review Complete On: 3/9/2018 By: Pineda Leone MD  
 No Known Allergies Current Immunizations  Reviewed on 3/9/2018 Name Date Influenza Vaccine 10/10/2017, 9/15/2016, 10/1/2015 Influenza Vaccine PF 2014  9:24 AM  
 TD Vaccine 2010  1:00 AM  
 Tdap 3/3/2016 Reviewed by Pineda Leone MD on 3/9/2018 at 12:04 PM  
You Were Diagnosed With   
  
 Codes Comments Essential hypertension    -  Primary ICD-10-CM: I10 
ICD-9-CM: 401.9 Hypercholesteremia     ICD-10-CM: E78.00 ICD-9-CM: 272.0 Gastroesophageal reflux disease without esophagitis     ICD-10-CM: K21.9 ICD-9-CM: 530.81 Vitals BP Pulse Temp Resp Height(growth percentile) Weight(growth percentile) 131/82 (BP 1 Location: Left arm, BP Patient Position: Sitting) 81 98.8 °F (37.1 °C) (Oral) 16 5' 6\" (1.676 m) 187 lb 6.4 oz (85 kg) SpO2 BMI Smoking Status 94% 30.25 kg/m2 Never Smoker Vitals History BMI and BSA Data Body Mass Index Body Surface Area  
 30.25 kg/m 2 1.99 m 2 Preferred Pharmacy Pharmacy Name Phone [phone] [phone] Your Updated Medication List  
  
   
This list is accurate as of 3/9/18 12:08 PM.  Always use your most recent med list.  
  
  
  
  
 losartan 50 mg tablet Commonly known as:  COZAAR  
TAKE 1 TABLET DAILY MULTIPLE VITAMINS PO Take  by mouth. One daily RETIN-A 0.01 % topical gel Generic drug:  tretinoin Apply  to affected area nightly. We Performed the Following CBC WITH AUTOMATED DIFF [16497 CPT(R)] LIPID PANEL [44593 CPT(R)] METABOLIC PANEL, COMPREHENSIVE [62343 CPT(R)] Introducing [facility] & [facility] SERVICES! Dear Mulu Castrejon: Thank you for requesting a [employer] account. Our records indicate that you have previously registered for a [employer] account but its currently inactive. Please call our [employer] support line at [phone]. Additional Information If you have questions, please visit the Frequently Asked Questions section of the [employer] website at [email or web address] Remember, [employer] is NOT to be used for urgent needs. For medical emergencies, dial 911. Now available from your iPhone and Android! Please provide this summary of care documentation to your next provider. Your primary care clinician is listed as [place]. If you have any questions after today's visit, please call [phone].

## 2018-09-14 ENCOUNTER — OFFICE VISIT (OUTPATIENT)
Dept: INTERNAL MEDICINE CLINIC | Age: 48
End: 2018-09-14

## 2018-09-14 VITALS
BODY MASS INDEX: 30.82 KG/M2 | SYSTOLIC BLOOD PRESSURE: 134 MMHG | HEIGHT: 66 IN | DIASTOLIC BLOOD PRESSURE: 81 MMHG | TEMPERATURE: 97.4 F | HEART RATE: 71 BPM | WEIGHT: 191.8 LBS | RESPIRATION RATE: 16 BRPM | OXYGEN SATURATION: 95 %

## 2018-09-14 DIAGNOSIS — M54.5 BILATERAL LOW BACK PAIN, UNSPECIFIED CHRONICITY, WITH SCIATICA PRESENCE UNSPECIFIED: ICD-10-CM

## 2018-09-14 DIAGNOSIS — Z00.00 ROUTINE GENERAL MEDICAL EXAMINATION AT A HEALTH CARE FACILITY: Primary | ICD-10-CM

## 2018-09-14 NOTE — PATIENT INSTRUCTIONS

## 2018-09-14 NOTE — MR AVS SNAPSHOT
[address] 
[phone] Patient: Elsie Laguerre MRN: [id number] :1970 Visit Information Date & Time Provider Department Dept. Phone Encounter #  
 2018  9:00 AM Ash Vasques, [address] [place] [address],[address] [phone] [id number] Follow-up Instructions Return in about 6 months (around 3/14/2019) for HTN. Upcoming Health Maintenance Date Due Influenza Age 5 to Adult 2018 DTaP/Tdap/Td series (2 - Td) 3/3/2026 Allergies as of 2018  Review Complete On: 2018 By: Ash Vasques MD  
 No Known Allergies Current Immunizations  Reviewed on 3/9/2018 Name Date Influenza Vaccine 10/10/2017, 9/15/2016, 10/1/2015 Influenza Vaccine PF 2014  9:24 AM  
 TD Vaccine 2010  1:00 AM  
 Tdap 3/3/2016 Not reviewed this visit You Were Diagnosed With   
  
 Codes Comments Routine general medical examination at a health care facility    -  Primary ICD-10-CM: Z00.00 ICD-9-CM: V70.0 Bilateral low back pain, unspecified chronicity, with sciatica presence unspecified     ICD-10-CM: M54.5 ICD-9-CM: 724.2 Vitals BP Pulse Temp Resp Height(growth percentile) Weight(growth percentile) 134/81 (BP 1 Location: Left arm, BP Patient Position: Sitting) 71 97.4 °F (36.3 °C) (Oral) 16 5' 6\" (1.676 m) 191 lb 12.8 oz (87 kg) SpO2 BMI Smoking Status 95% 30.96 kg/m2 Never Smoker Vitals History BMI and BSA Data Body Mass Index Body Surface Area 30.96 kg/m 2 2.01 m 2 Preferred Pharmacy Pharmacy Name Phone [phone] [phone] Your Updated Medication List  
  
   
This list is accurate as of 18  9:22 AM.  Always use your most recent med list.  
  
  
  
  
 losartan 50 mg tablet Commonly known as:  COZAAR  
 TAKE 1 TABLET DAILY MULTIPLE VITAMINS PO Take  by mouth. One daily RETIN-A 0.01 % topical gel Generic drug:  tretinoin Apply  to affected area nightly. We Performed the Following REFERRAL TO ORTHOPEDIC SURGERY [REF62 Custom] Follow-up Instructions Return in about 6 months (around 3/14/2019) for HTN. Referral Information Referral ID Referred By Referred To  
  
 [id number] Paulo SCHMIDT MD   
   [address] [facility], [address] Phone: [phone] Fax: [phone] Visits Status Start Date End Date 1 New Request 9/14/18 9/14/19 If your referral has a status of pending review or denied, additional information will be sent to support the outcome of this decision. Patient Instructions Well Visit, Men 48 to 72: Care Instructions Your Care Instructions Physical exams can help you stay healthy. Your doctor has checked your overall health and may have suggested ways to take good care of yourself. He or she also may have recommended tests. At home, you can help prevent illness with healthy eating, regular exercise, and other steps. Follow-up care is a key part of your treatment and safety. Be sure to make and go to all appointments, and call your doctor if you are having problems. It's also a good idea to know your test results and keep a list of the medicines you take. How can you care for yourself at home? · Reach and stay at a healthy weight. This will lower your risk for many problems, such as obesity, diabetes, heart disease, and high blood pressure. · Get at least 30 minutes of exercise on most days of the week. Walking is a good choice. You also may want to do other activities, such as running, swimming, cycling, or playing tennis or team sports. · Do not smoke. Smoking can make health problems worse.  If you need help quitting, talk to your doctor about stop-smoking programs and medicines. These can increase your chances of quitting for good. · Protect your skin from too much sun. When you're outdoors from 10 a.m. to 4 p.m., stay in the shade or cover up with clothing and a hat with a wide brim. Wear sunglasses that block UV rays. Even when it's cloudy, put broad-spectrum sunscreen (SPF 30 or higher) on any exposed skin. · See a dentist one or two times a year for checkups and to have your teeth cleaned. · Wear a seat belt in the car. · Limit alcohol to 2 drinks a day. Too much alcohol can cause health problems. Follow your doctor's advice about when to have certain tests. These tests can spot problems early. · Cholesterol. Your doctor will tell you how often to have this done based on your overall health and other things that can increase your risk for heart attack and stroke. · Blood pressure. Have your blood pressure checked during a routine doctor visit. Your doctor will tell you how often to check your blood pressure based on your age, your blood pressure results, and other factors. · Prostate exam. Talk to your doctor about whether you should have a blood test (called a PSA test) for prostate cancer. Experts disagree on whether men should have this test. Some experts recommend that you discuss the benefits and risks of the test with your doctor. · Diabetes. Ask your doctor whether you should have tests for diabetes. · Vision. Some experts recommend that you have yearly exams for glaucoma and other age-related eye problems starting at age 48. · Hearing. Tell your doctor if you notice any change in your hearing. You can have tests to find out how well you hear. · Colon cancer. You should begin tests for colon cancer at age 48. You may have one of several tests. Your doctor will tell you how often to have tests based on your age and risk.  Risks include whether you already had a precancerous polyp removed from your colon or whether your parent, brother, sister, or child has had colon cancer. · Heart attack and stroke risk. At least every 4 to 6 years, you should have your risk for heart attack and stroke assessed. Your doctor uses factors such as your age, blood pressure, cholesterol, and whether you smoke or have diabetes to show what your risk for a heart attack or stroke is over the next 10 years. · Abdominal aortic aneurysm. Ask your doctor whether you should have a test to check for an aneurysm. You may need a test if you ever smoked or if your parent, brother, sister, or child has had an aneurysm. When should you call for help? Watch closely for changes in your health, and be sure to contact your doctor if you have any problems or symptoms that concern you. Where can you learn more? Go to http://erlin-neha.info/. Enter H270 in the search box to learn more about \"Well Visit, Men 48 to 72: Care Instructions. \" Current as of: Angie 10, 2017 Content Version: 11.7 © 6670-4993 Venaxis. Care instructions adapted under license by Goby (which disclaims liability or warranty for this information). If you have questions about a medical condition or this instruction, always ask your healthcare professional. [place] [address] any warranty or liability for your use of this information. Introducing [facility] & HEALTH SERVICES! Dear Leif Pacheco: Thank you for requesting a [employer] account. Our records indicate that you have previously registered for a [employer] account but its currently inactive. Please call our [employer] support line at [phone]. Additional Information If you have questions, please visit the Frequently Asked Questions section of the [employer] website at [email or web address] Remember, [employer] is NOT to be used for urgent needs. For medical emergencies, dial 911. Now available from your iPhone and Android! Please provide this summary of care documentation to your next provider. Your primary care clinician is listed as [place]. If you have any questions after today's visit, please call [phone].

## 2018-09-14 NOTE — PROGRESS NOTES
1. Have you been to the ER, urgent care clinic since your last visit? Hospitalized since your last visit?no 2. Have you seen or consulted any other health care providers outside of the [address] [place] [address] since your last visit? Include any pap smears or colon screening.  no

## 2018-09-14 NOTE — PROGRESS NOTES
SUBJECTIVE:  
Mr. Manny Nam is a 52 y.o. male who is here for CPE and follow up of routine medical issues. Chief Complaint Patient presents with  Annual Exam  
  pt here today for annual   
 Muscle Pain  
  pt c/o aches in back, hamstring, buttock area x 2 months He had R hip arthroscopy with Dr. Alicia Leroy in March. Now complains of back \"tightness\", bilaterally into gluteals. At this time, he is otherwise doing well and has brought no other complaints to my attention today. For a list of the medical issues addressed today, see the assessment and plan below. PMH:  
Past Medical History:  
Diagnosis Date  Bursitis of shoulder   
 right; \"comes and goes\"  Essential hypertension 9/8/2017  GERD (gastroesophageal reflux disease)  Hypercholesteremia  Osteoarthritis PSH:  
Past Surgical History:  
Procedure Laterality Date  HX ACL RECONSTRUCTION    
 HX HERNIA REPAIR    
 HX OTHER SURGICAL INGROWN TOE NAIL REMOVED OFF L big toe  HX OTHER SURGICAL  03/2018  
 orthoscopy--R hip  HX SHOULDER ARTHROSCOPY  3/01/2012 Right shoulder  HX SHOULDER ARTHROSCOPY  04/2014 R shoulder All: He has No Known Allergies. MEDS:  
Current Outpatient Prescriptions Medication Sig  
 losartan (COZAAR) 50 mg tablet TAKE 1 TABLET DAILY  tretinoin (RETIN-A) 0.01 % topical gel Apply  to affected area nightly.  MULTIVITAMINS (MULTIPLE VITAMINS PO) Take  by mouth. One daily No current facility-administered medications for this visit. FH: Father has a hematologic disorder. Mother has sciatica. Two sisters alive and well. SH: . Lifts weight. He reports that he has never smoked. He has never used smokeless tobacco. He reports that he does not drink alcohol. ROS: See above; Complete ROS otherwise negative. OBJECTIVE:  
Vitals:  
Visit Vitals  /81 (BP 1 Location: Left arm, BP Patient Position: Sitting)  Pulse 71  
  Temp 97.4 °F (36.3 °C) (Oral)  Resp 16  
 Ht 5' 6\" (1.676 m)  Wt 191 lb 12.8 oz (87 kg)  SpO2 95%  BMI 30.96 kg/m2 Gen: Pleasant 52 y.o.  male in NAD. HEENT: PERRLA. EOMI. OP moist and pink. Neck: Supple. No LAD. HEART: RRR, No M/G/R.    LUNGS: CTAB No W/R. ABDOMEN: S, NT, ND, BS+. EXTREMITIES: Warm. No C/C/E.  MUSCULOSKELETAL: Normal ROM, muscle strength 5/5 all groups. R knee crepitus with flexion and extension. NEURO: Alert and oriented x 3. Cranial nerves grossly intact. No focal sensory or motor deficits noted. SKIN: Warm. Dry. No rashes or other lesions noted. ASSESSMENT/ PLAN:  
1. Cervical radiculopathy: New. For, now, conservative treatment. 2. Hypertension: Controlled. 3. Lipid screening / Hypercholesterolemia: Due for recheck. - METABOLIC PANEL, COMPREHENSIVE 
- LIPID PANEL 4. GERD (gastroesophageal reflux disease): Currently quiescent. Off meds. 5. Weight: Diet and exercise. I have reviewed the patient's medications and risks/side effects/benefits were discussed. Diagnosis(-es) explained to patient and questions answered. Literature provided where appropriate. Follow-up Disposition: Not on File

## 2018-09-17 LAB
ALBUMIN SERPL-MCNC: 4.2 G/DL (ref 3.5–5.5)
ALBUMIN/GLOB SERPL: 1.8 {RATIO} (ref 1.2–2.2)
ALP SERPL-CCNC: 70 IU/L (ref 39–117)
ALT SERPL-CCNC: 35 IU/L (ref 0–44)
AST SERPL-CCNC: 22 IU/L (ref 0–40)
BASOPHILS # BLD AUTO: 0.1 X10E3/UL (ref 0–0.2)
BASOPHILS NFR BLD AUTO: 1 %
BILIRUB SERPL-MCNC: 0.6 MG/DL (ref 0–1.2)
BUN SERPL-MCNC: 11 MG/DL (ref 6–24)
BUN/CREAT SERPL: 10 (ref 9–20)
CALCIUM SERPL-MCNC: 9.6 MG/DL (ref 8.7–10.2)
CHLORIDE SERPL-SCNC: 104 MMOL/L (ref 96–106)
CHOLEST SERPL-MCNC: 227 MG/DL (ref 100–199)
CO2 SERPL-SCNC: 22 MMOL/L (ref 20–29)
CREAT SERPL-MCNC: 1.11 MG/DL (ref 0.76–1.27)
EOSINOPHIL # BLD AUTO: 0.4 X10E3/UL (ref 0–0.4)
EOSINOPHIL NFR BLD AUTO: 9 %
ERYTHROCYTE [DISTWIDTH] IN BLOOD BY AUTOMATED COUNT: 13.6 % (ref 12.3–15.4)
GLOBULIN SER CALC-MCNC: 2.4 G/DL (ref 1.5–4.5)
GLUCOSE SERPL-MCNC: 90 MG/DL (ref 65–99)
HCT VFR BLD AUTO: 45.3 % (ref 37.5–51)
HDLC SERPL-MCNC: 37 MG/DL
HGB BLD-MCNC: 15.2 G/DL (ref 13–17.7)
IMM GRANULOCYTES # BLD: 0 X10E3/UL (ref 0–0.1)
IMM GRANULOCYTES NFR BLD: 0 %
LDLC SERPL CALC-MCNC: ABNORMAL MG/DL (ref 0–99)
LYMPHOCYTES # BLD AUTO: 1.6 X10E3/UL (ref 0.7–3.1)
LYMPHOCYTES NFR BLD AUTO: 33 %
MCH RBC QN AUTO: 30.5 PG (ref 26.6–33)
MCHC RBC AUTO-ENTMCNC: 33.6 G/DL (ref 31.5–35.7)
MCV RBC AUTO: 91 FL (ref 79–97)
MONOCYTES # BLD AUTO: 0.3 X10E3/UL (ref 0.1–0.9)
MONOCYTES NFR BLD AUTO: 7 %
NEUTROPHILS # BLD AUTO: 2.5 X10E3/UL (ref 1.4–7)
NEUTROPHILS NFR BLD AUTO: 50 %
PLATELET # BLD AUTO: 266 X10E3/UL (ref 150–379)
POTASSIUM SERPL-SCNC: 4.3 MMOL/L (ref 3.5–5.2)
PROT SERPL-MCNC: 6.6 G/DL (ref 6–8.5)
RBC # BLD AUTO: 4.98 X10E6/UL (ref 4.14–5.8)
SODIUM SERPL-SCNC: 143 MMOL/L (ref 134–144)
TRIGL SERPL-MCNC: 405 MG/DL (ref 0–149)
VLDLC SERPL CALC-MCNC: ABNORMAL MG/DL (ref 5–40)
WBC # BLD AUTO: 5 X10E3/UL (ref 3.4–10.8)

## 2018-09-19 ENCOUNTER — TELEPHONE (OUTPATIENT)
Dept: INTERNAL MEDICINE CLINIC | Age: 48
End: 2018-09-19

## 2018-09-20 ENCOUNTER — TELEPHONE (OUTPATIENT)
Dept: INTERNAL MEDICINE CLINIC | Age: 48
End: 2018-09-20

## 2018-09-20 NOTE — TELEPHONE ENCOUNTER
Pt notified that Physician Health Screening Form has been completed and faxed to [phone], approved fax confirmation received. Pt notified that form has been completed and a copy will be mailed out to home address.

## 2018-10-12 ENCOUNTER — HOSPITAL ENCOUNTER (OUTPATIENT)
Dept: MRI IMAGING | Age: 48
Discharge: HOME | End: 2018-10-12
Payer: COMMERCIAL

## 2018-10-12 ENCOUNTER — HOSPITAL ENCOUNTER (OUTPATIENT)
Dept: GENERAL RADIOLOGY | Age: 48
Discharge: HOME | End: 2018-10-12
Payer: COMMERCIAL

## 2018-10-12 DIAGNOSIS — M54.30 SCIATICA: ICD-10-CM

## 2018-10-12 PROCEDURE — 72148 MRI LUMBAR SPINE W/O DYE: CPT

## 2018-10-12 PROCEDURE — 72110 X-RAY EXAM L-2 SPINE 4/>VWS: CPT

## 2018-11-20 ENCOUNTER — HOSPITAL ENCOUNTER (OUTPATIENT)
Dept: PREADMISSION TESTING | Age: 48
Discharge: HOME | End: 2018-11-20
Attending: SPECIALIST
Payer: COMMERCIAL

## 2018-11-20 VITALS
OXYGEN SATURATION: 97 % | BODY MASS INDEX: 29.83 KG/M2 | HEART RATE: 64 BPM | RESPIRATION RATE: 18 BRPM | HEIGHT: 66 IN | TEMPERATURE: 98.5 F | WEIGHT: 185.63 LBS | DIASTOLIC BLOOD PRESSURE: 87 MMHG | SYSTOLIC BLOOD PRESSURE: 136 MMHG

## 2018-11-20 LAB
ABO + RH BLD: NORMAL
ALBUMIN SERPL-MCNC: 4 G/DL (ref 3.5–5)
ALBUMIN/GLOB SERPL: 1.2 {RATIO} (ref 1.1–2.2)
ALP SERPL-CCNC: 70 U/L (ref 45–117)
ALT SERPL-CCNC: 35 U/L (ref 12–78)
ANION GAP SERPL CALC-SCNC: 9 MMOL/L (ref 5–15)
APPEARANCE UR: ABNORMAL
APTT PPP: 28.6 SEC (ref 22.1–32)
AST SERPL-CCNC: 19 U/L (ref 15–37)
ATRIAL RATE: 63 BPM
BACTERIA URNS QL MICRO: NEGATIVE /HPF
BASOPHILS # BLD: 0 K/UL (ref 0–0.1)
BASOPHILS NFR BLD: 1 % (ref 0–1)
BILIRUB SERPL-MCNC: 1.1 MG/DL (ref 0.2–1)
BILIRUB UR QL: NEGATIVE
BLOOD GROUP ANTIBODIES SERPL: NORMAL
BUN SERPL-MCNC: 13 MG/DL (ref 6–20)
BUN/CREAT SERPL: 12 (ref 12–20)
CALCIUM SERPL-MCNC: 9 MG/DL (ref 8.5–10.1)
CALCULATED P AXIS, ECG09: 14 DEGREES
CALCULATED R AXIS, ECG10: -11 DEGREES
CALCULATED T AXIS, ECG11: -3 DEGREES
CHLORIDE SERPL-SCNC: 106 MMOL/L (ref 97–108)
CO2 SERPL-SCNC: 26 MMOL/L (ref 21–32)
COLOR UR: ABNORMAL
CREAT SERPL-MCNC: 1.07 MG/DL (ref 0.7–1.3)
DIAGNOSIS, 93000: NORMAL
DIFFERENTIAL METHOD BLD: NORMAL
EOSINOPHIL # BLD: 0.3 K/UL (ref 0–0.4)
EOSINOPHIL NFR BLD: 7 % (ref 0–7)
EPITH CASTS URNS QL MICRO: ABNORMAL /LPF
ERYTHROCYTE [DISTWIDTH] IN BLOOD BY AUTOMATED COUNT: 12.1 % (ref 11.5–14.5)
EST. AVERAGE GLUCOSE BLD GHB EST-MCNC: 111 MG/DL
GLOBULIN SER CALC-MCNC: 3.3 G/DL (ref 2–4)
GLUCOSE SERPL-MCNC: 100 MG/DL (ref 65–100)
GLUCOSE UR STRIP.AUTO-MCNC: NEGATIVE MG/DL
HBA1C MFR BLD: 5.5 % (ref 4.2–6.3)
HCT VFR BLD AUTO: 45.3 % (ref 36.6–50.3)
HGB BLD-MCNC: 15.8 G/DL (ref 12.1–17)
HGB UR QL STRIP: NEGATIVE
HYALINE CASTS URNS QL MICRO: ABNORMAL /LPF (ref 0–5)
IMM GRANULOCYTES # BLD: 0 K/UL (ref 0–0.04)
IMM GRANULOCYTES NFR BLD AUTO: 0 % (ref 0–0.5)
INR PPP: 1 (ref 0.9–1.1)
KETONES UR QL STRIP.AUTO: NEGATIVE MG/DL
LEUKOCYTE ESTERASE UR QL STRIP.AUTO: NEGATIVE
LYMPHOCYTES # BLD: 1.4 K/UL (ref 0.8–3.5)
LYMPHOCYTES NFR BLD: 31 % (ref 12–49)
MCH RBC QN AUTO: 30.4 PG (ref 26–34)
MCHC RBC AUTO-ENTMCNC: 34.9 G/DL (ref 30–36.5)
MCV RBC AUTO: 87.3 FL (ref 80–99)
MONOCYTES # BLD: 0.3 K/UL (ref 0–1)
MONOCYTES NFR BLD: 7 % (ref 5–13)
NEUTS SEG # BLD: 2.5 K/UL (ref 1.8–8)
NEUTS SEG NFR BLD: 55 % (ref 32–75)
NITRITE UR QL STRIP.AUTO: NEGATIVE
NRBC # BLD: 0 K/UL (ref 0–0.01)
NRBC BLD-RTO: 0 PER 100 WBC
P-R INTERVAL, ECG05: 150 MS
PH UR STRIP: 7.5 [PH] (ref 5–8)
PLATELET # BLD AUTO: 269 K/UL (ref 150–400)
PMV BLD AUTO: 9.7 FL (ref 8.9–12.9)
POTASSIUM SERPL-SCNC: 4.1 MMOL/L (ref 3.5–5.1)
PROT SERPL-MCNC: 7.3 G/DL (ref 6.4–8.2)
PROT UR STRIP-MCNC: NEGATIVE MG/DL
PROTHROMBIN TIME: 10.3 SEC (ref 9–11.1)
Q-T INTERVAL, ECG07: 404 MS
QRS DURATION, ECG06: 86 MS
QTC CALCULATION (BEZET), ECG08: 413 MS
RBC # BLD AUTO: 5.19 M/UL (ref 4.1–5.7)
RBC #/AREA URNS HPF: ABNORMAL /HPF (ref 0–5)
SODIUM SERPL-SCNC: 141 MMOL/L (ref 136–145)
SP GR UR REFRACTOMETRY: 1.02 (ref 1–1.03)
SPECIMEN EXP DATE BLD: NORMAL
THERAPEUTIC RANGE,PTTT: NORMAL SECS (ref 58–77)
UA: UC IF INDICATED,UAUC: ABNORMAL
UROBILINOGEN UR QL STRIP.AUTO: 1 EU/DL (ref 0.2–1)
VENTRICULAR RATE, ECG03: 63 BPM
WBC # BLD AUTO: 4.6 K/UL (ref 4.1–11.1)
WBC URNS QL MICRO: ABNORMAL /HPF (ref 0–4)

## 2018-11-20 PROCEDURE — 93005 ELECTROCARDIOGRAM TRACING: CPT

## 2018-11-20 PROCEDURE — 36415 COLL VENOUS BLD VENIPUNCTURE: CPT

## 2018-11-20 PROCEDURE — 85025 COMPLETE CBC W/AUTO DIFF WBC: CPT

## 2018-11-20 PROCEDURE — 83036 HEMOGLOBIN GLYCOSYLATED A1C: CPT

## 2018-11-20 PROCEDURE — 86850 RBC ANTIBODY SCREEN: CPT

## 2018-11-20 PROCEDURE — 81001 URINALYSIS AUTO W/SCOPE: CPT

## 2018-11-20 PROCEDURE — 80053 COMPREHEN METABOLIC PANEL: CPT

## 2018-11-20 PROCEDURE — 85730 THROMBOPLASTIN TIME PARTIAL: CPT

## 2018-11-20 PROCEDURE — 85610 PROTHROMBIN TIME: CPT

## 2018-11-20 RX ORDER — CEFAZOLIN SODIUM/WATER 2 G/20 ML
2 SYRINGE (ML) INTRAVENOUS ONCE
Status: CANCELLED | OUTPATIENT
Start: 2018-11-28 | End: 2018-11-28

## 2018-11-20 RX ORDER — SODIUM CHLORIDE, SODIUM LACTATE, POTASSIUM CHLORIDE, CALCIUM CHLORIDE 600; 310; 30; 20 MG/100ML; MG/100ML; MG/100ML; MG/100ML
25 INJECTION, SOLUTION INTRAVENOUS CONTINUOUS
Status: CANCELLED | OUTPATIENT
Start: 2018-11-28

## 2018-11-20 NOTE — PERIOP NOTES
Incentive Magno Quezada Using the incentive spirometer helps expand the small air sacs of your lungs, helps you breathe deeply, and helps improve your lung function. Use your incentive spirometer twice a day (10 breaths each time) prior to surgery. How to Use Your Incentive Spirometer: 1. Hold the incentive spirometer in an upright position. 2. Breathe out as usual.  
3. Place the mouthpiece in your mouth and seal your lips tightly around it. 4. Take a deep breath. Breathe in slowly and as deeply as possible. Keep the blue flow rate guide between the arrows. 5. Hold your breath as long as possible. Then exhale slowly and allow the piston to fall to the bottom of the column. 6. Rest for a few seconds and repeat steps one through five at least 10 times. PAT Tidal Volume: Unable to assess at PAT visit. Patient given instructions and verbalized understanding. BRING THE INCENTIVE SPIROMETER WITH YOU TO THE HOSPITAL ON THE DAY OF YOUR SURGERY. Opportunity given to ask and answer questions as well as to observe return demonstration. Patient signature_____________________________    Witness____________________________

## 2018-11-20 NOTE — PERIOP NOTES
[facility] Preoperative Instructions Surgery Date 11/28/2018          Time of Arrival 6:15 a.m. 
 
1. On the day of your surgery, please report to the Surgical Services Registration Desk and sign in at your designated time. The Surgery Center is located to the right of the Emergency Room. 2. You must have someone with you to drive you home. You should not drive a car for 24 hours following surgery. Please make arrangements for a friend or family member to stay with you for the first 24 hours after your surgery. 3. Do not have anything to eat or drink (including water, gum, mints, coffee, juice) after midnight. ?This may not apply to medications prescribed by your physician. ?(Please note below the special instructions with medications to take the morning of your procedure.) 4. We recommend you do not drink any alcoholic beverages for 24 hours before and after your surgery. 5. Contact your surgeons office for instructions on the following medications: non-steroidal anti-inflammatory drugs (i.e. Advil, Aleve), vitamins, and supplements. (Some surgeons will want you to stop these medications prior to surgery and others may allow you to take them) **If you are currently taking Plavix, Coumadin, Aspirin and/or other blood-thinning agents, contact your surgeon for instructions. ** Your surgeon will partner with the physician prescribing these medications to determine if it is safe to stop or if you need to continue taking. Please do not stop taking these medications without instructions from your surgeon 6. Wear comfortable clothes. Wear glasses instead of contacts. Do not bring any money or jewelry. Please bring picture ID, insurance card, and any prearranged co-payment or hospital payment. Do not wear make-up, particularly mascara the morning of your surgery. Do not wear nail polish, particularly if you are having foot /hand surgery.   Wear your hair loose or down, no ponytails, buns, rosanne pins or clips. All body piercings must be removed. Please shower with antibacterial soap for three consecutive days before and on the morning of surgery, but do not apply any lotions, powders or deodorants after the shower on the day of surgery. Please use a fresh towels after each shower. Please sleep in clean clothes and change bed linens the night before surgery. Please do not shave for 48 hours prior to surgery. Shaving of the face is acceptable. 7. You should understand that if you do not follow these instructions your surgery may be cancelled. If your physical condition changes (I.e. fever, cold or flu) please contact your surgeon as soon as possible. 8. It is important that you be on time. If a situation occurs where you may be late, please call [phone] (OR Holding Area). 9. If you have any questions and or problems, please call [phone] (Pre-admission Testing). 10. Your surgery time may be subject to change. You will receive a phone call the evening prior if your time changes. 11.  If having outpatient surgery, you must have someone to drive you here, stay with you during the duration of your stay, and to drive you home at time of discharge. 12.   In an effort to improve the efficiency, privacy, and safety for all of our Pre-op patients visitors are not allowed in the Holding area. Once you arrive and are registered your family/visitors will be asked to remain in the waiting room. The Pre-op staff will get you from the Surgical Waiting Area and will explain to you and your family/visitors that the Pre-op phase is beginning. The staff will answer any questions and provide instructions for tracking of the patient, by use of the existing tracking number and color-coded status board in the waiting room.   At this time the staff will also ask for your designated spokesperson information in the event that the physician or staff need to provide an update or obtain any pertinent information. The designated spokesperson will be notified if the physician needs to speak to family during the pre-operative phase. If at any time your family/visitors has questions or concerns they may approach the volunteer desk in the waiting area for assistance. Special Instructions: MEDICATIONS TO TAKE THE MORNING OF SURGERY WITH A SIP OF WATER: None I understand a pre-operative phone call will be made to verify my surgery time. In the event that I am not available, I give permission for a message to be left on my answering service and/or with another person? Yes [phone] 
 
 
 
 ___________________      __________   _________ 
  (Signature of Patient)             (Witness)                (Date and Time)

## 2018-11-21 LAB
BACTERIA SPEC CULT: NORMAL
BACTERIA SPEC CULT: NORMAL
SERVICE CMNT-IMP: NORMAL

## 2018-11-28 ENCOUNTER — APPOINTMENT (OUTPATIENT)
Dept: GENERAL RADIOLOGY | Age: 48
DRG: 455 | End: 2018-11-28
Attending: SPECIALIST
Payer: COMMERCIAL

## 2018-11-28 ENCOUNTER — HOSPITAL ENCOUNTER (INPATIENT)
Age: 48
LOS: 1 days | Discharge: HOME | DRG: 455 | End: 2018-11-29
Attending: SPECIALIST | Admitting: SPECIALIST
Payer: COMMERCIAL

## 2018-11-28 ENCOUNTER — ANESTHESIA (OUTPATIENT)
Dept: SURGERY | Age: 48
DRG: 455 | End: 2018-11-28
Payer: COMMERCIAL

## 2018-11-28 ENCOUNTER — ANESTHESIA EVENT (OUTPATIENT)
Dept: SURGERY | Age: 48
DRG: 455 | End: 2018-11-28
Payer: COMMERCIAL

## 2018-11-28 DIAGNOSIS — Z98.1 S/P LUMBAR FUSION: Primary | ICD-10-CM

## 2018-11-28 PROBLEM — M43.16 SPONDYLOLISTHESIS OF LUMBAR REGION: Status: ACTIVE | Noted: 2018-11-28

## 2018-11-28 PROCEDURE — 74011250637 HC RX REV CODE- 250/637: Performed by: SPECIALIST

## 2018-11-28 PROCEDURE — 74011000258 HC RX REV CODE- 258

## 2018-11-28 PROCEDURE — C1713 ANCHOR/SCREW BN/BN,TIS/BN: HCPCS | Performed by: SPECIALIST

## 2018-11-28 PROCEDURE — 77030026438 HC STYL ET INTUB CARD -A: Performed by: NURSE ANESTHETIST, CERTIFIED REGISTERED

## 2018-11-28 PROCEDURE — 76210000017 HC OR PH I REC 1.5 TO 2 HR: Performed by: SPECIALIST

## 2018-11-28 PROCEDURE — 74011250636 HC RX REV CODE- 250/636: Performed by: ANESTHESIOLOGY

## 2018-11-28 PROCEDURE — 97161 PT EVAL LOW COMPLEX 20 MIN: CPT

## 2018-11-28 PROCEDURE — 77030003193 HC GRFT RECOMB BN MEDT -H: Performed by: SPECIALIST

## 2018-11-28 PROCEDURE — 77030036564 HC WRP CLD THER BACK S2SG -B: Performed by: SPECIALIST

## 2018-11-28 PROCEDURE — 77030018836 HC SOL IRR NACL ICUM -A: Performed by: SPECIALIST

## 2018-11-28 PROCEDURE — 77030039327 HC SPCR SPN ELEVATE MEDT -K1: Performed by: SPECIALIST

## 2018-11-28 PROCEDURE — 77030032490 HC SLV COMPR SCD KNE COVD -B: Performed by: SPECIALIST

## 2018-11-28 PROCEDURE — 77030019908 HC STETH ESOPH SIMS -A: Performed by: NURSE ANESTHETIST, CERTIFIED REGISTERED

## 2018-11-28 PROCEDURE — 65270000029 HC RM PRIVATE

## 2018-11-28 PROCEDURE — 94760 N-INVAS EAR/PLS OXIMETRY 1: CPT

## 2018-11-28 PROCEDURE — 74011250636 HC RX REV CODE- 250/636

## 2018-11-28 PROCEDURE — 77030003029 HC SUT VCRL J&J -B: Performed by: SPECIALIST

## 2018-11-28 PROCEDURE — 77030008467 HC STPLR SKN COVD -B: Performed by: SPECIALIST

## 2018-11-28 PROCEDURE — 77030029099 HC BN WAX SSPC -A: Performed by: SPECIALIST

## 2018-11-28 PROCEDURE — 77030013079 HC BLNKT BAIR HGGR 3M -A: Performed by: NURSE ANESTHETIST, CERTIFIED REGISTERED

## 2018-11-28 PROCEDURE — 74011000250 HC RX REV CODE- 250: Performed by: SPECIALIST

## 2018-11-28 PROCEDURE — 77030020263 HC SOL INJ SOD CL0.9% LFCR 1000ML: Performed by: SPECIALIST

## 2018-11-28 PROCEDURE — 0SG0071 FUSION OF LUMBAR VERTEBRAL JOINT WITH AUTOLOGOUS TISSUE SUBSTITUTE, POSTERIOR APPROACH, POSTERIOR COLUMN, OPEN APPROACH: ICD-10-PCS | Performed by: SPECIALIST

## 2018-11-28 PROCEDURE — 77030018846 HC SOL IRR STRL H20 ICUM -A: Performed by: SPECIALIST

## 2018-11-28 PROCEDURE — 74011000250 HC RX REV CODE- 250

## 2018-11-28 PROCEDURE — 77030018719 HC DRSG PTCH ANTIMIC J&J -A: Performed by: SPECIALIST

## 2018-11-28 PROCEDURE — 0SB20ZZ EXCISION OF LUMBAR VERTEBRAL DISC, OPEN APPROACH: ICD-10-PCS | Performed by: SPECIALIST

## 2018-11-28 PROCEDURE — 77030008684 HC TU ET CUF COVD -B: Performed by: NURSE ANESTHETIST, CERTIFIED REGISTERED

## 2018-11-28 PROCEDURE — 77030002933 HC SUT MCRYL J&J -A: Performed by: SPECIALIST

## 2018-11-28 PROCEDURE — 76060000040 HC ANESTHESIA 4.5 TO 5 HR: Performed by: SPECIALIST

## 2018-11-28 PROCEDURE — 76010000176 HC OR TIME 4.5 TO 5 HR INTENSV-TIER 1: Performed by: SPECIALIST

## 2018-11-28 PROCEDURE — 74011250636 HC RX REV CODE- 250/636: Performed by: SPECIALIST

## 2018-11-28 PROCEDURE — 77030012406 HC DRN WND PENRS BARD -A: Performed by: SPECIALIST

## 2018-11-28 PROCEDURE — 77030018723 HC ELCTRD BLD COVD -A: Performed by: SPECIALIST

## 2018-11-28 PROCEDURE — 4A11X4G MONITORING OF PERIPHERAL NERVOUS ELECTRICAL ACTIVITY, INTRAOPERATIVE, EXTERNAL APPROACH: ICD-10-PCS | Performed by: SPECIALIST

## 2018-11-28 PROCEDURE — 0SG00AJ FUSION OF LUMBAR VERTEBRAL JOINT WITH INTERBODY FUSION DEVICE, POSTERIOR APPROACH, ANTERIOR COLUMN, OPEN APPROACH: ICD-10-PCS | Performed by: SPECIALIST

## 2018-11-28 PROCEDURE — 77030034850: Performed by: SPECIALIST

## 2018-11-28 PROCEDURE — 77030008771 HC TU NG SALEM SUMP -A: Performed by: NURSE ANESTHETIST, CERTIFIED REGISTERED

## 2018-11-28 PROCEDURE — 76001 XR FLUOROSCOPY OVER 60 MINUTES: CPT

## 2018-11-28 PROCEDURE — 77030013708 HC HNDPC SUC IRR PULS STRY –B: Performed by: SPECIALIST

## 2018-11-28 PROCEDURE — 72100 X-RAY EXAM L-S SPINE 2/3 VWS: CPT

## 2018-11-28 PROCEDURE — 77030014007 HC SPNG HEMSTAT J&J -B: Performed by: SPECIALIST

## 2018-11-28 PROCEDURE — 77030012890

## 2018-11-28 PROCEDURE — 74011000272 HC RX REV CODE- 272: Performed by: SPECIALIST

## 2018-11-28 PROCEDURE — 77010033678 HC OXYGEN DAILY

## 2018-11-28 PROCEDURE — 77030004391 HC BUR FLUT MEDT -C: Performed by: SPECIALIST

## 2018-11-28 PROCEDURE — 77030014647 HC SEAL FBRN TISSL BAXT -D: Performed by: SPECIALIST

## 2018-11-28 PROCEDURE — 97530 THERAPEUTIC ACTIVITIES: CPT

## 2018-11-28 PROCEDURE — 77030020782 HC GWN BAIR PAWS FLX 3M -B

## 2018-11-28 DEVICE — SCREW 55840005035 5.5/6 MAS 5.0X35 CC
Type: IMPLANTABLE DEVICE | Site: SPINE LUMBAR | Status: FUNCTIONAL
Brand: CD HORIZON® SPINAL SYSTEM

## 2018-11-28 DEVICE — BONE GRAFT KIT 7510100 INFUSE X SMALL
Type: IMPLANTABLE DEVICE | Site: SPINE LUMBAR | Status: FUNCTIONAL
Brand: INFUSE® BONE GRAFT

## 2018-11-28 RX ORDER — ONDANSETRON 2 MG/ML
4 INJECTION INTRAMUSCULAR; INTRAVENOUS AS NEEDED
Status: DISCONTINUED | OUTPATIENT
Start: 2018-11-28 | End: 2018-11-28 | Stop reason: HOSPADM

## 2018-11-28 RX ORDER — MIDAZOLAM HYDROCHLORIDE 1 MG/ML
1 INJECTION, SOLUTION INTRAMUSCULAR; INTRAVENOUS AS NEEDED
Status: DISCONTINUED | OUTPATIENT
Start: 2018-11-28 | End: 2018-11-28 | Stop reason: HOSPADM

## 2018-11-28 RX ORDER — PROPOFOL 10 MG/ML
INJECTION, EMULSION INTRAVENOUS AS NEEDED
Status: DISCONTINUED | OUTPATIENT
Start: 2018-11-28 | End: 2018-11-28 | Stop reason: HOSPADM

## 2018-11-28 RX ORDER — SODIUM CHLORIDE 0.9 % (FLUSH) 0.9 %
5-10 SYRINGE (ML) INJECTION AS NEEDED
Status: DISCONTINUED | OUTPATIENT
Start: 2018-11-28 | End: 2018-11-28 | Stop reason: HOSPADM

## 2018-11-28 RX ORDER — NEOSTIGMINE METHYLSULFATE 1 MG/ML
INJECTION INTRAVENOUS AS NEEDED
Status: DISCONTINUED | OUTPATIENT
Start: 2018-11-28 | End: 2018-11-28 | Stop reason: HOSPADM

## 2018-11-28 RX ORDER — ROCURONIUM BROMIDE 10 MG/ML
INJECTION, SOLUTION INTRAVENOUS AS NEEDED
Status: DISCONTINUED | OUTPATIENT
Start: 2018-11-28 | End: 2018-11-28 | Stop reason: HOSPADM

## 2018-11-28 RX ORDER — PHENYLEPHRINE HCL IN 0.9% NACL 0.4MG/10ML
SYRINGE (ML) INTRAVENOUS AS NEEDED
Status: DISCONTINUED | OUTPATIENT
Start: 2018-11-28 | End: 2018-11-28 | Stop reason: HOSPADM

## 2018-11-28 RX ORDER — SODIUM CHLORIDE 0.9 % (FLUSH) 0.9 %
5-10 SYRINGE (ML) INJECTION EVERY 8 HOURS
Status: DISCONTINUED | OUTPATIENT
Start: 2018-11-28 | End: 2018-11-28 | Stop reason: HOSPADM

## 2018-11-28 RX ORDER — POLYETHYLENE GLYCOL 3350 17 G/17G
17 POWDER, FOR SOLUTION ORAL DAILY
Qty: 255 G | Refills: 0 | Status: SHIPPED | OUTPATIENT
Start: 2018-11-28 | End: 2018-12-13

## 2018-11-28 RX ORDER — MIDAZOLAM HYDROCHLORIDE 1 MG/ML
0.5 INJECTION, SOLUTION INTRAMUSCULAR; INTRAVENOUS
Status: DISCONTINUED | OUTPATIENT
Start: 2018-11-28 | End: 2018-11-28 | Stop reason: HOSPADM

## 2018-11-28 RX ORDER — FENTANYL CITRATE 50 UG/ML
25 INJECTION, SOLUTION INTRAMUSCULAR; INTRAVENOUS
Status: DISCONTINUED | OUTPATIENT
Start: 2018-11-28 | End: 2018-11-28 | Stop reason: HOSPADM

## 2018-11-28 RX ORDER — HYDROMORPHONE HYDROCHLORIDE 2 MG/ML
INJECTION, SOLUTION INTRAMUSCULAR; INTRAVENOUS; SUBCUTANEOUS AS NEEDED
Status: DISCONTINUED | OUTPATIENT
Start: 2018-11-28 | End: 2018-11-28 | Stop reason: HOSPADM

## 2018-11-28 RX ORDER — CEFAZOLIN SODIUM/WATER 2 G/20 ML
2 SYRINGE (ML) INTRAVENOUS ONCE
Status: COMPLETED | OUTPATIENT
Start: 2018-11-28 | End: 2018-11-28

## 2018-11-28 RX ORDER — MIDAZOLAM HYDROCHLORIDE 1 MG/ML
INJECTION, SOLUTION INTRAMUSCULAR; INTRAVENOUS AS NEEDED
Status: DISCONTINUED | OUTPATIENT
Start: 2018-11-28 | End: 2018-11-28 | Stop reason: HOSPADM

## 2018-11-28 RX ORDER — ONDANSETRON 2 MG/ML
INJECTION INTRAMUSCULAR; INTRAVENOUS AS NEEDED
Status: DISCONTINUED | OUTPATIENT
Start: 2018-11-28 | End: 2018-11-28 | Stop reason: HOSPADM

## 2018-11-28 RX ORDER — FENTANYL CITRATE 50 UG/ML
INJECTION, SOLUTION INTRAMUSCULAR; INTRAVENOUS AS NEEDED
Status: DISCONTINUED | OUTPATIENT
Start: 2018-11-28 | End: 2018-11-28 | Stop reason: HOSPADM

## 2018-11-28 RX ORDER — DIPHENHYDRAMINE HCL 25 MG
25 CAPSULE ORAL
Status: DISCONTINUED | OUTPATIENT
Start: 2018-11-28 | End: 2018-11-29 | Stop reason: HOSPADM

## 2018-11-28 RX ORDER — OXYCODONE HYDROCHLORIDE 5 MG/1
10 TABLET ORAL
Status: DISCONTINUED | OUTPATIENT
Start: 2018-11-28 | End: 2018-11-29 | Stop reason: HOSPADM

## 2018-11-28 RX ORDER — THROMBIN, TOPICAL (BOVINE) 20000 UNIT
20000 KIT TOPICAL ONCE
Status: COMPLETED | OUTPATIENT
Start: 2018-11-28 | End: 2018-11-28

## 2018-11-28 RX ORDER — OXYCODONE HYDROCHLORIDE 5 MG/1
5 TABLET ORAL
Status: DISCONTINUED | OUTPATIENT
Start: 2018-11-28 | End: 2018-11-29 | Stop reason: HOSPADM

## 2018-11-28 RX ORDER — AMOXICILLIN 250 MG
1 CAPSULE ORAL 2 TIMES DAILY
Qty: 60 TAB | Refills: 0 | Status: SHIPPED | OUTPATIENT
Start: 2018-11-28 | End: 2019-03-15

## 2018-11-28 RX ORDER — BUPIVACAINE HYDROCHLORIDE AND EPINEPHRINE 5; 5 MG/ML; UG/ML
30 INJECTION, SOLUTION EPIDURAL; INTRACAUDAL; PERINEURAL ONCE
Status: COMPLETED | OUTPATIENT
Start: 2018-11-28 | End: 2018-11-28

## 2018-11-28 RX ORDER — ACETAMINOPHEN 325 MG/1
650 TABLET ORAL EVERY 6 HOURS
Qty: 112 TAB | Refills: 0 | Status: SHIPPED | OUTPATIENT
Start: 2018-11-28 | End: 2018-12-12

## 2018-11-28 RX ORDER — SODIUM CHLORIDE 0.9 % (FLUSH) 0.9 %
5-10 SYRINGE (ML) INJECTION EVERY 8 HOURS
Status: DISCONTINUED | OUTPATIENT
Start: 2018-11-29 | End: 2018-11-29 | Stop reason: HOSPADM

## 2018-11-28 RX ORDER — ONDANSETRON 2 MG/ML
4 INJECTION INTRAMUSCULAR; INTRAVENOUS
Status: DISCONTINUED | OUTPATIENT
Start: 2018-11-28 | End: 2018-11-29 | Stop reason: HOSPADM

## 2018-11-28 RX ORDER — SODIUM CHLORIDE 0.9 % (FLUSH) 0.9 %
5-10 SYRINGE (ML) INJECTION AS NEEDED
Status: DISCONTINUED | OUTPATIENT
Start: 2018-11-28 | End: 2018-11-29 | Stop reason: HOSPADM

## 2018-11-28 RX ORDER — LIDOCAINE HYDROCHLORIDE 10 MG/ML
0.1 INJECTION, SOLUTION EPIDURAL; INFILTRATION; INTRACAUDAL; PERINEURAL AS NEEDED
Status: DISCONTINUED | OUTPATIENT
Start: 2018-11-28 | End: 2018-11-28 | Stop reason: HOSPADM

## 2018-11-28 RX ORDER — FENTANYL CITRATE 50 UG/ML
50 INJECTION, SOLUTION INTRAMUSCULAR; INTRAVENOUS AS NEEDED
Status: DISCONTINUED | OUTPATIENT
Start: 2018-11-28 | End: 2018-11-28 | Stop reason: HOSPADM

## 2018-11-28 RX ORDER — ACETAMINOPHEN 325 MG/1
650 TABLET ORAL EVERY 6 HOURS
Status: DISCONTINUED | OUTPATIENT
Start: 2018-11-28 | End: 2018-11-29 | Stop reason: HOSPADM

## 2018-11-28 RX ORDER — LIDOCAINE HYDROCHLORIDE AND EPINEPHRINE 10; 10 MG/ML; UG/ML
1.5 INJECTION, SOLUTION INFILTRATION; PERINEURAL ONCE
Status: COMPLETED | OUTPATIENT
Start: 2018-11-28 | End: 2018-11-28

## 2018-11-28 RX ORDER — OXYCODONE HYDROCHLORIDE 5 MG/1
5 TABLET ORAL AS NEEDED
Status: DISCONTINUED | OUTPATIENT
Start: 2018-11-28 | End: 2018-11-28 | Stop reason: HOSPADM

## 2018-11-28 RX ORDER — GLYCOPYRROLATE 0.2 MG/ML
INJECTION INTRAMUSCULAR; INTRAVENOUS AS NEEDED
Status: DISCONTINUED | OUTPATIENT
Start: 2018-11-28 | End: 2018-11-28 | Stop reason: HOSPADM

## 2018-11-28 RX ORDER — DEXAMETHASONE SODIUM PHOSPHATE 4 MG/ML
INJECTION, SOLUTION INTRA-ARTICULAR; INTRALESIONAL; INTRAMUSCULAR; INTRAVENOUS; SOFT TISSUE AS NEEDED
Status: DISCONTINUED | OUTPATIENT
Start: 2018-11-28 | End: 2018-11-28 | Stop reason: HOSPADM

## 2018-11-28 RX ORDER — LOSARTAN POTASSIUM 50 MG/1
50 TABLET ORAL DAILY
Status: DISCONTINUED | OUTPATIENT
Start: 2018-11-29 | End: 2018-11-29 | Stop reason: HOSPADM

## 2018-11-28 RX ORDER — OXYCODONE HYDROCHLORIDE 5 MG/1
5-10 TABLET ORAL
Qty: 60 TAB | Refills: 0 | Status: SHIPPED | OUTPATIENT
Start: 2018-11-28 | End: 2019-03-15

## 2018-11-28 RX ORDER — CEFAZOLIN SODIUM/WATER 2 G/20 ML
2 SYRINGE (ML) INTRAVENOUS EVERY 8 HOURS
Status: COMPLETED | OUTPATIENT
Start: 2018-11-28 | End: 2018-11-29

## 2018-11-28 RX ORDER — SODIUM CHLORIDE, SODIUM LACTATE, POTASSIUM CHLORIDE, CALCIUM CHLORIDE 600; 310; 30; 20 MG/100ML; MG/100ML; MG/100ML; MG/100ML
25 INJECTION, SOLUTION INTRAVENOUS CONTINUOUS
Status: DISCONTINUED | OUTPATIENT
Start: 2018-11-28 | End: 2018-11-28 | Stop reason: HOSPADM

## 2018-11-28 RX ORDER — CEFAZOLIN SODIUM 1 G/3ML
INJECTION, POWDER, FOR SOLUTION INTRAMUSCULAR; INTRAVENOUS AS NEEDED
Status: DISCONTINUED | OUTPATIENT
Start: 2018-11-28 | End: 2018-11-28 | Stop reason: HOSPADM

## 2018-11-28 RX ORDER — MORPHINE SULFATE 10 MG/ML
2 INJECTION, SOLUTION INTRAMUSCULAR; INTRAVENOUS
Status: DISCONTINUED | OUTPATIENT
Start: 2018-11-28 | End: 2018-11-28 | Stop reason: HOSPADM

## 2018-11-28 RX ORDER — HYDROMORPHONE HYDROCHLORIDE 1 MG/ML
0.2 INJECTION, SOLUTION INTRAMUSCULAR; INTRAVENOUS; SUBCUTANEOUS
Status: DISCONTINUED | OUTPATIENT
Start: 2018-11-28 | End: 2018-11-28 | Stop reason: HOSPADM

## 2018-11-28 RX ORDER — LIDOCAINE HYDROCHLORIDE 20 MG/ML
INJECTION, SOLUTION EPIDURAL; INFILTRATION; INTRACAUDAL; PERINEURAL AS NEEDED
Status: DISCONTINUED | OUTPATIENT
Start: 2018-11-28 | End: 2018-11-28 | Stop reason: HOSPADM

## 2018-11-28 RX ORDER — NALOXONE HYDROCHLORIDE 0.4 MG/ML
0.4 INJECTION, SOLUTION INTRAMUSCULAR; INTRAVENOUS; SUBCUTANEOUS AS NEEDED
Status: DISCONTINUED | OUTPATIENT
Start: 2018-11-28 | End: 2018-11-29 | Stop reason: HOSPADM

## 2018-11-28 RX ORDER — ACETAMINOPHEN 10 MG/ML
INJECTION, SOLUTION INTRAVENOUS AS NEEDED
Status: DISCONTINUED | OUTPATIENT
Start: 2018-11-28 | End: 2018-11-28 | Stop reason: HOSPADM

## 2018-11-28 RX ORDER — SODIUM CHLORIDE, SODIUM LACTATE, POTASSIUM CHLORIDE, CALCIUM CHLORIDE 600; 310; 30; 20 MG/100ML; MG/100ML; MG/100ML; MG/100ML
125 INJECTION, SOLUTION INTRAVENOUS CONTINUOUS
Status: DISCONTINUED | OUTPATIENT
Start: 2018-11-28 | End: 2018-11-28 | Stop reason: HOSPADM

## 2018-11-28 RX ORDER — SUCCINYLCHOLINE CHLORIDE 20 MG/ML
INJECTION INTRAMUSCULAR; INTRAVENOUS AS NEEDED
Status: DISCONTINUED | OUTPATIENT
Start: 2018-11-28 | End: 2018-11-28 | Stop reason: HOSPADM

## 2018-11-28 RX ORDER — DIPHENHYDRAMINE HYDROCHLORIDE 50 MG/ML
12.5 INJECTION, SOLUTION INTRAMUSCULAR; INTRAVENOUS AS NEEDED
Status: DISCONTINUED | OUTPATIENT
Start: 2018-11-28 | End: 2018-11-28 | Stop reason: HOSPADM

## 2018-11-28 RX ORDER — SODIUM CHLORIDE 9 MG/ML
125 INJECTION, SOLUTION INTRAVENOUS CONTINUOUS
Status: DISCONTINUED | OUTPATIENT
Start: 2018-11-28 | End: 2018-11-29 | Stop reason: HOSPADM

## 2018-11-28 RX ADMIN — MIDAZOLAM HYDROCHLORIDE 2 MG: 1 INJECTION, SOLUTION INTRAMUSCULAR; INTRAVENOUS at 08:29

## 2018-11-28 RX ADMIN — NEOSTIGMINE METHYLSULFATE 3 MG: 1 INJECTION INTRAVENOUS at 12:52

## 2018-11-28 RX ADMIN — ACETAMINOPHEN 650 MG: 325 TABLET ORAL at 18:23

## 2018-11-28 RX ADMIN — ROCURONIUM BROMIDE 10 MG: 10 INJECTION, SOLUTION INTRAVENOUS at 11:43

## 2018-11-28 RX ADMIN — SODIUM CHLORIDE 125 ML/HR: 900 INJECTION, SOLUTION INTRAVENOUS at 20:52

## 2018-11-28 RX ADMIN — SODIUM CHLORIDE 125 ML/HR: 900 INJECTION, SOLUTION INTRAVENOUS at 13:22

## 2018-11-28 RX ADMIN — Medication 40 MCG: at 09:08

## 2018-11-28 RX ADMIN — ROCURONIUM BROMIDE 20 MG: 10 INJECTION, SOLUTION INTRAVENOUS at 10:27

## 2018-11-28 RX ADMIN — CEFAZOLIN SODIUM 1 G: 1 INJECTION, POWDER, FOR SOLUTION INTRAMUSCULAR; INTRAVENOUS at 12:27

## 2018-11-28 RX ADMIN — ROCURONIUM BROMIDE 5 MG: 10 INJECTION, SOLUTION INTRAVENOUS at 08:31

## 2018-11-28 RX ADMIN — Medication 2 G: at 18:23

## 2018-11-28 RX ADMIN — FENTANYL CITRATE 50 MCG: 50 INJECTION, SOLUTION INTRAMUSCULAR; INTRAVENOUS at 08:29

## 2018-11-28 RX ADMIN — ACETAMINOPHEN 1000 MG: 10 INJECTION, SOLUTION INTRAVENOUS at 08:57

## 2018-11-28 RX ADMIN — SODIUM CHLORIDE, SODIUM LACTATE, POTASSIUM CHLORIDE, AND CALCIUM CHLORIDE 25 ML/HR: 600; 310; 30; 20 INJECTION, SOLUTION INTRAVENOUS at 06:50

## 2018-11-28 RX ADMIN — ROCURONIUM BROMIDE 40 MG: 10 INJECTION, SOLUTION INTRAVENOUS at 08:53

## 2018-11-28 RX ADMIN — HYDROMORPHONE HYDROCHLORIDE 0.5 MG: 2 INJECTION, SOLUTION INTRAMUSCULAR; INTRAVENOUS; SUBCUTANEOUS at 11:25

## 2018-11-28 RX ADMIN — Medication 80 MCG: at 09:17

## 2018-11-28 RX ADMIN — LIDOCAINE HYDROCHLORIDE 80 MG: 20 INJECTION, SOLUTION EPIDURAL; INFILTRATION; INTRACAUDAL; PERINEURAL at 08:31

## 2018-11-28 RX ADMIN — DEXAMETHASONE SODIUM PHOSPHATE 8 MG: 4 INJECTION, SOLUTION INTRA-ARTICULAR; INTRALESIONAL; INTRAMUSCULAR; INTRAVENOUS; SOFT TISSUE at 09:05

## 2018-11-28 RX ADMIN — Medication 80 MCG: at 11:37

## 2018-11-28 RX ADMIN — SUCCINYLCHOLINE CHLORIDE 160 MG: 20 INJECTION INTRAMUSCULAR; INTRAVENOUS at 08:31

## 2018-11-28 RX ADMIN — GLYCOPYRROLATE 0.4 MG: 0.2 INJECTION INTRAMUSCULAR; INTRAVENOUS at 12:52

## 2018-11-28 RX ADMIN — ONDANSETRON 4 MG: 2 INJECTION INTRAMUSCULAR; INTRAVENOUS at 12:43

## 2018-11-28 RX ADMIN — ROCURONIUM BROMIDE 10 MG: 10 INJECTION, SOLUTION INTRAVENOUS at 11:24

## 2018-11-28 RX ADMIN — ROCURONIUM BROMIDE 20 MG: 10 INJECTION, SOLUTION INTRAVENOUS at 09:46

## 2018-11-28 RX ADMIN — SODIUM CHLORIDE, SODIUM LACTATE, POTASSIUM CHLORIDE, AND CALCIUM CHLORIDE: 600; 310; 30; 20 INJECTION, SOLUTION INTRAVENOUS at 10:36

## 2018-11-28 RX ADMIN — Medication 80 MCG: at 10:11

## 2018-11-28 RX ADMIN — Medication 80 MCG: at 11:08

## 2018-11-28 RX ADMIN — FENTANYL CITRATE 50 MCG: 50 INJECTION, SOLUTION INTRAMUSCULAR; INTRAVENOUS at 11:43

## 2018-11-28 RX ADMIN — Medication 2 G: at 08:44

## 2018-11-28 RX ADMIN — PROPOFOL 200 MG: 10 INJECTION, EMULSION INTRAVENOUS at 08:31

## 2018-11-28 RX ADMIN — FENTANYL CITRATE 100 MCG: 50 INJECTION, SOLUTION INTRAMUSCULAR; INTRAVENOUS at 08:54

## 2018-11-28 NOTE — ANESTHESIA POSTPROCEDURE EVALUATION
Procedure(s): 
L4-5 LAMINECTOMY, FUSION, PLIF. Anesthesia Post Evaluation Patient location during evaluation: PACU Note status: Adequate. Level of consciousness: responsive to verbal stimuli and sleepy but conscious Pain management: satisfactory to patient Airway patency: patent Anesthetic complications: no 
Cardiovascular status: acceptable Respiratory status: acceptable Hydration status: acceptable Comments: +Post-Anesthesia Evaluation and Assessment Patient: Jo-Ann Delgadillo MRN: [id number]  SSN: [id number] YOB: 1970  Age: 50 y.o. Sex: male Cardiovascular Function/Vital Signs /82   Pulse 74   Temp 37.1 °C (98.7 °F)   Resp 13   Ht 5' 6\" (1.676 m)   Wt 84.2 kg (185 lb 10 oz)   SpO2 97%   BMI 29.96 kg/m² Patient is status post Procedure(s): 
L4-5 LAMINECTOMY, FUSION, PLIF. Nausea/Vomiting: Controlled. Postoperative hydration reviewed and adequate. Pain: 
Pain Scale 1: Numeric (0 - 10) (11/28/18 1430) Pain Intensity 1: 3 (11/28/18 1430) Managed. Neurological Status:  
Neuro (WDL): Exceptions to WDL (11/28/18 1308) At baseline. Mental Status and Level of Consciousness: Arousable. Pulmonary Status:  
O2 Device: Nasal cannula (11/28/18 1308) Adequate oxygenation and airway patent. Complications related to anesthesia: None Post-anesthesia assessment completed. No concerns. Signed By: La Brooks MD  
 11/28/2018 Post anesthesia nausea and vomiting:  controlled Visit Vitals /82 Pulse 74 Temp 37.1 °C (98.7 °F) Resp 13 Ht 5' 6\" (1.676 m) Wt 84.2 kg (185 lb 10 oz) SpO2 97% BMI 29.96 kg/m²

## 2018-11-28 NOTE — ROUTINE PROCESS
Patient: Tish Vaughan MRN: [id number]  SSN: [id number] YOB: 1970  Age: 50 y.o. Sex: male Patient is status post Procedure(s): 
L4-5 LAMINECTOMY, FUSION, PLIF. Surgeon(s) and Role: Georgiana Gibson MD - Primary Local/Dose/Irrigation:  4 ML LIDOCAINE WITH EPI, 18 ML MARCAINE WITH EPI Peripheral IV 11/28/18 Left Arm (Active) Site Assessment Clean, dry, & intact 11/28/2018  6:44 AM  
Phlebitis Assessment 0 11/28/2018  6:44 AM  
Infiltration Assessment 0 11/28/2018  6:44 AM  
Dressing Status Clean, dry, & intact 11/28/2018  6:44 AM  
Dressing Type Tape;Transparent 11/28/2018  6:44 AM  
Hub Color/Line Status Pink; Infusing 11/28/2018  6:44 AM  
      
Hemovac Lower Back (Active) Site Assessment Clean, dry, & intact 11/28/2018 12:27 PM  
Dressing Status Clean, dry, & intact 11/28/2018 12:27 PM  
Drainage Description Serosanguinous 11/28/2018 12:27 PM  
Status Draining; Charged; Patent 11/28/2018 12:27 PM  
  
Airway - Endotracheal Tube 11/28/18 Oral (Active) Line Romain Lips 11/28/2018 12:00 AM  
         
 
 
 
Dressing/Packing:  Wound Back-DRESSING TYPE: Staples; Other (Comment)(HONEYCOMB DRESSING) (11/28/18 1253) Splint/Cast:  ] Other:  BEST CATH IN PLACE.

## 2018-11-28 NOTE — PERIOP NOTES
TRANSFER - OUT REPORT: 
 
Verbal report given to GAL Figueredo on Tish Vaughan  being transferred to [phone] for routine post - op Report consisted of patients Situation, Background, Assessment and  
Recommendations(SBAR). Information from the following report(s) OR Summary, Procedure Summary, Intake/Output and MAR was reviewed with the receiving nurse. Opportunity for questions and clarification was provided. Patient transported with: 
 O2 @ 2 liters Tech

## 2018-11-28 NOTE — PROGRESS NOTES
Ortho/ NeuroSurgery NP Note POD# 0  s/p L4-5 LAMINECTOMY, FUSION, PLIF Pt resting in bed. No complaints. Walked in gleason with therapy. VSS Afebrile. Patient has had something to eat/drink. No nausea. Most Recent Labs:  
Lab Results Component Value Date/Time HGB 15.8 11/20/2018 01:34 PM  
 Hemoglobin A1c 5.5 11/20/2018 01:34 PM  
 
 
Body mass index is 29.96 kg/m². Reference: BMI greater than 30 is classified as obesity and greater than 40 is classified as morbid obesity. STOP BANG Score: 1 Voiding status: Patient needs to void today. Dressing c.d.i Drain in place. Calves soft and supple; No pain with passive stretch Sensation and motor intact SCDs for mechanical DVT proph Plan: 
1) PT BID starting today 2) Elly-op Antibiotics Ancef 3) Discharge plans to home with his wife tomorrow vs Friday depending on progress and drain output.    
 
Melchor Stewart, [place]

## 2018-11-28 NOTE — PROGRESS NOTES
Physical Therapy Goals Initiated 11/28/2018 1. Patient will move from supine to sit and sit to supine , scoot up and down and roll side to side in bed with independence within 4 days. 2. Patient will perform sit to stand with independence within 4 days. 3. Patient will ambulate with independence for 656 feet with the least restrictive device within 4 days. 4. Patient will ascend/descend 13 stairs with single handrail(s) with independence within 4 days. 5. Patient will verbalize and demonstrate understanding of spinal precautions (No bending, lifting greater than 5 lbs, or twisting; log-roll technique; frequent repositioning as instructed) within 4 days. physical Therapy EVALUATION Patient: Janny Mon (3 y.o. male) Date: 11/28/2018 Primary Diagnosis: LUMBAR SPONDILOLISTHESIS Spondylolisthesis of lumbar region Procedure(s) (LRB): 
L4-5 LAMINECTOMY, FUSION, PLIF (N/A) Day of Surgery Precautions:   Spinal, Fall ASSESSMENT : 
Based on the objective data described below, the patient presents POD 0 s/p L4/5 Laminectomy and fusion with associated impairments including decreased AROM, decreased strength, and gait deviations described below. Pt presented supine, pleasant and agreeable to therapy. Cleared by RN to mobilize. Pt history and PLOF recounted as noted below. Pt initially on 2L NC, SpO2 at 96%, removed for session. VS monitored during session as noted below, elevated diastolic BP when sitting but reduced to within normal range throughout session. Prior to surgery pt reported that he had occasional tingling in the R LE, but post surgery has no impairments in sensation. Pt instructed in spinal precautions and log rolling technique verbally. Pt able to perform supine to sit with log roll technique with min-modA needed for LE management and trunk control. Pt able to perform sit<>stand throughout session with CGA.  Pt reluctant to use RW, so amb with CGA and gait belt 200 ft in hallway. Pt displaying narrowed JAYESH, decreased step clearance, decreased step length, and antalgic gait pattern (pt noting cramping in L calf, remaining unchanged pre, during, and post activity). Pt returned to room, able to return to supine with min-modA. Pt requesting to remain completely supine to end session, RN aware. At discharge, based on pt's current presentation pt would require HHPT based on limitations in mobility. Pt may improve during acute stay to progress to outpatient PT or none. Vitals:  
 11/28/18 1635 11/28/18 1654 11/28/18 1655 11/28/18 1659 BP: 139/68 (!) 185/101 174/89 135/83 BP 1 Location: Right arm Right arm Right arm Right arm BP Patient Position: Supine Sitting Standing Supine; Post activity Pulse: 86 Resp: 16 Temp: 97.7 °F (36.5 °C) SpO2: 96% Weight:      
Height:      
 
 
Patient will benefit from skilled intervention to address the above impairments. Patients rehabilitation potential is considered to be Excellent Factors which may influence rehabilitation potential include:  
[x]         None noted 
[]         Mental ability/status []         Medical condition 
[]         Home/family situation and support systems 
[]         Safety awareness 
[]         Pain tolerance/management 
[]         Other: PLAN : 
Recommendations and Planned Interventions: 
[x]           Bed Mobility Training             []    Neuromuscular Re-Education 
[x]           Transfer Training                   []    Orthotic/Prosthetic Training 
[x]           Gait Training                         []    Modalities [x]           Therapeutic Exercises           []    Edema Management/Control 
[x]           Therapeutic Activities            [x]    Patient and Family Training/Education 
[]           Other (comment): Frequency/Duration: Patient will be followed by physical therapy  twice daily to address goals.  
Discharge Recommendations: HHPT 
 Further Equipment Recommendations for Discharge: None SUBJECTIVE:  
Patient stated How fast are you trying to get me to walk lady? Bruce Mendez OBJECTIVE DATA SUMMARY:  
HISTORY:   
Past Medical History:  
Diagnosis Date  Bursitis of shoulder   
 right; \"comes and goes\"  Essential hypertension 9/8/2017  GERD (gastroesophageal reflux disease)  Hypercholesteremia  Osteoarthritis Past Surgical History:  
Procedure Laterality Date  HX ACL RECONSTRUCTION Left  HX HERNIA REPAIR    
 HX OTHER SURGICAL INGROWN TOE NAIL REMOVED OFF L big toe  HX OTHER SURGICAL  03/2018  
 orthoscopy--R hip  HX REFRACTIVE SURGERY    
 HX SHOULDER ARTHROSCOPY  3/01/2012 Right shoulder  HX SHOULDER ARTHROSCOPY  04/2014 R shoulder  HX VASECTOMY Prior Level of Function/Home Situation: PTA pt independent in amb without need of AD. Pt living at home with his wife and family. Pt reports being independent in ADLs. Personal factors and/or comorbidities impacting plan of care: HTN, HLD Home Situation Home Environment: Private residence Rails to Enter: Yes Hand Rails : Right One/Two Story Residence: Two story Living Alone: No 
Support Systems: Family member(s), Friends \ neighbors, Spouse/Significant Other/Partner Patient Expects to be Discharged to[de-identified] Private residence Current DME Used/Available at Home: None Tub or Shower Type: Shower EXAMINATION/PRESENTATION/DECISION MAKING: Critical Behavior: 
Neurologic State: Alert, Eyes open spontaneously Orientation Level: Oriented X4 Cognition: Follows commands Hearing: Auditory Auditory Impairment: None Range Of Motion: 
AROM: Generally decreased, functional 
  
  
  
  
  
  
  
Strength:   
Strength: Generally decreased, functional 
  
  
  
  
  
  
Tone & Sensation:  
Tone: Normal 
  
  
  
  
Sensation: Intact Coordination: 
Coordination: Within functional limits Functional Mobility: 
Bed Mobility: Rolling: Minimum assistance Supine to Sit: Minimum assistance; Moderate assistance Sit to Supine: Minimum assistance; Moderate assistance Scooting: Contact guard assistance Transfers: 
Sit to Stand: Contact guard assistance Stand to Sit: Contact guard assistance Balance:  
Sitting: Intact Standing: Intact Ambulation/Gait Training: 
Distance (ft): 200 Feet (ft) Assistive Device: Gait belt Ambulation - Level of Assistance: Contact guard assistance;Assist x1 Gait Description (WDL): Exceptions to [facility] Gait Abnormalities: Decreased step clearance; Antalgic(limping on LLE (cramp in calf)) Base of Support: Narrowed Speed/Darlene: Slow Step Length: Left shortened;Right shortened Functional Measure: 
Barthel Index: 
 
Bathin Bladder: 0 Bowels: 10 
Groomin Dressin Feeding: 10 Mobility: 10 Stairs: 5 Toilet Use: 5 Transfer (Bed to Chair and Back): 10 Total: 60 Barthel and G-code impairment scale: 
Percentage of impairment CH 
0% CI 
1-19% CJ 
20-39% CK 
40-59% CL 
60-79% CM 
80-99% CN 
100% Barthel Score 0-100 100 99-80 79-60 59-40 20-39 1-19 
 0 Barthel Score 0-20 20 17-19 13-16 9-12 5-8 1-4 0 The Barthel ADL Index: Guidelines 1. The index should be used as a record of what a patient does, not as a record of what a patient could do. 2. The main aim is to establish degree of independence from any help, physical or verbal, however minor and for whatever reason. 3. The need for supervision renders the patient not independent. 4. A patient's performance should be established using the best available evidence. Asking the patient, friends/relatives and nurses are the usual sources, but direct observation and common sense are also important. However direct testing is not needed. 5. Usually the patient's performance over the preceding 24-48 hours is important, but occasionally longer periods will be relevant. 6. Middle categories imply that the patient supplies over 50 per cent of the effort. 7. Use of aids to be independent is allowed. James Goncalves., Barthel, D.W. (8804). Functional evaluation: the Barthel Index. [address] (14)2. MELODY Cui, Aracelis Nederland., [facility], [address] [facility] (1999). Measuring the change indisability after inpatient rehabilitation; comparison of the responsiveness of the Barthel Index and Functional [place] Measure. Journal of Neurology, Neurosurgery, and Psychiatry, 66(4), 331-971. SHEY Masterson, OLIVERIO Awad, & Sherine Quesada M.A. (2004.) Assessment of post-stroke quality of life in cost-effectiveness studies: The usefulness of the Barthel Index and the EuroQoL-5D. [facility], 13, 219-76 G codes: In compliance with CMSs Claims Based Outcome Reporting, the following G-code set was chosen for this patient based on their primary functional limitation being treated: The outcome measure chosen to determine the severity of the functional limitation was the Barthel with a score of 60/100 which was correlated with the impairment scale. ? Mobility - Walking and Moving Around:  
  - CURRENT STATUS: CJ - 20%-39% impaired, limited or restricted  - GOAL STATUS: CI - 1%-19% impaired, limited or restricted  - D/C STATUS:  ---------------To be determined---------------  
  
 
Pain: 
Pain Scale 1: Numeric (0 - 10) Pain Intensity 1: 3 Pain Location 1: Back Pain Orientation 1: Lower Pain Description 1: Aching Activity Tolerance: Pt with good activity tolerance, able to complete all activity without complaint Please refer to the flowsheet for vital signs taken during this treatment. After treatment:  
[]         Patient left in no apparent distress sitting up in chair 
[x]         Patient left in no apparent distress in bed 
[x]         Call bell left within reach [x]         Nursing notified [x]         Caregiver present 
[]         Bed alarm activated COMMUNICATION/EDUCATION:  
The patients plan of care was discussed with: Registered Nurse. [x]         Fall prevention education was provided and the patient/caregiver indicated understanding. [x]         Patient/family have participated as able in goal setting and plan of care. [x]         Patient/family agree to work toward stated goals and plan of care. []         Patient understands intent and goals of therapy, but is neutral about his/her participation. []         Patient is unable to participate in goal setting and plan of care. Thank you for this referral. 
Aby Montenegro, [facility] Time Calculation: 22 mins Regarding student involvement in patient care: A student participated in this treatment session. Per CMS Medicare statements and APTA guidelines I certify that the following was true: 1. I was present and directly observed the entire session. 2. I made all skilled judgments and clinical decisions regarding care. 3. I am the practitioner responsible for assessment, treatment, and documentation.

## 2018-11-28 NOTE — ROUTINE PROCESS
Patient: Tosin Hardiwck MRN: [id number]  SSN: [id number] YOB: 1970  Age: 50 y.o. Sex: male Patient is status post Procedure(s): 
L4-5 LAMINECTOMY, FUSION, PLIF. Surgeon(s) and Role: Ramya Crisostomo MD - Primary Local/Dose/Irrigation:  4 ML LIDO, 18 ML MARCAINE Peripheral IV 11/28/18 Left Arm (Active) Site Assessment Clean, dry, & intact 11/28/2018  6:44 AM  
Phlebitis Assessment 0 11/28/2018  6:44 AM  
Infiltration Assessment 0 11/28/2018  6:44 AM  
Dressing Status Clean, dry, & intact 11/28/2018  6:44 AM  
Dressing Type Tape;Transparent 11/28/2018  6:44 AM  
Hub Color/Line Status Pink; Infusing 11/28/2018  6:44 AM  
      
Hemovac Lower Back (Active) Site Assessment Clean, dry, & intact 11/28/2018 12:27 PM  
Dressing Status Clean, dry, & intact 11/28/2018 12:27 PM  
Drainage Description Serosanguinous 11/28/2018 12:27 PM  
Status Draining; Charged; Patent 11/28/2018 12:27 PM  
  
Airway - Endotracheal Tube 11/28/18 Oral (Active) Line Romain Lips 11/28/2018 12:00 AM  
         
 
 
 
Dressing/Packing:    
Splint/Cast:  ] Other:  BEST CATHETER IN PLACE.

## 2018-11-28 NOTE — OP NOTES
[address] REPORT    Alfredo Munoz  MR#: [id number]  : 1970  ACCOUNT #: [de-identified]   DATE OF SERVICE: 2018    PREOPERATIVE DIAGNOSES:  Lumbar spondylolisthesis. POSTOPERATIVE DIAGNOSIS:  Lumbar spondylolisthesis. PROCEDURES PERFORMED:  1. L4-5 laminectomy. 2.  Posterior lumbar interbody fusion, L4-5. 3.  Posterolateral fusion, L4-5.  4. Instrumentation L4-5 with Medtronic Solera CD Horizon 5.5/6.0 system with cortical screws and titanium tianna. 5.  Placement of interbody cage. 6.  Use of the O-arm. SURGEON:  Felicitas Qiu MD    ASSISTANT:  OR staff. ANESTHESIA:  General endotracheal.    INDICATIONS:  The patient is a 54-year-old gentleman with back pain and bilateral leg pain. He was found to have a spondylolisthesis and stenosis at L4-5. It was decided to take him to the operating room to decompress his spine. FINDINGS:  Good decompression of the spondylolisthesis, good placement of the hardware. With respect to the O-arm, the assessment was done on the workstation of the images in 3 dimensions with loading the radiographic images that were used for new navigation, analysis of the surgical anatomy and pathology in relationship to the surrounding anatomy, and use of the navigation system to identify placement of the drill holes with appropriate alignment. 2 grams of Ancef were given prior to incision and orders written to stop within 24 hours. SCDs used during the entire case. DESCRIPTION OF PROCEDURE:  The patient was brought to the operating room. After appropriate anesthesia was administered, he was turned prone on a Colt table and all pressure points adequately padded. Incision was marked out using standard landmarks and infiltrated with 1% lidocaine with 1:100,000 epinephrine. Incision was made using a #10 blade and Bovie electrocautery was used to come through the subcutaneous tissue.   A subperiosteal dissection was done along L4 and L5 up to the facet joints with the plan for cortical screws. I did go over to just expose the transverse processes on the right for bone grafting. At this point, the O-arm was brought in with the above parameters and a spin was done. This was then loaded into the computer and used for navigation of the screws. Cortical screws were placed at L4 and L5 using the navigation, which were drilled and then tapped and then Gelfoam was packed into the screw holes for later placement of the screws. At this point, a decompression was done at L4 and L5, taking down the complete lamina as well as the facet joint at the pars. On the right side, it was quite stuck down and there was actually a ligamentum flavum stuck completely to the dura. This was peeled off in a piecemeal fashion until it was free and the nerve roots were identified and free. This was done bilaterally. At this point, interbody fusion was done at L4-5. The disk space on the left side was opened up using a 15 blade followed by dena to open this up further. The endplates were then prepared for grafting. Infuse was placed anterior to the cage, an Elevate cage, which was a PEEK titanium expandable cage. It was packed with bone and then tamped into place. This was then expanded out with some reduction of the spondylolisthesis. At this point, posterolateral fusion was done on the right side using the remainder of the local bone and decorticating over the transverse processes on the right and packing this in. At this point, instrumentation was placed in the previously made holes. These were 5.0 x 35 mm cortical screws, which were placed in an outward trajectory at each one of the holes at L4 and L5. A tianna was chosen of the appropriate length and set down onto the seats and then set screws were placed into these and broken off with the appropriate torque.   This O-arm was then brought in and another spin was done, which showed good placement of the hardware. The area was irrigated with a liter of antibiotic-containing solution. A drain was placed through a separate stab incision. Closure was done in anatomical layers. The skin was reapproximated using staples. COUNTS:  Sponge and needle counts were correct x2. COMPLICATIONS:  No complications. ESTIMATED BLOOD LOSS:  150 mL. SPECIMENS REMOVED:        IMPLANTS:        Dr. Cheikh Chauhan was present during the entire case from start to finish.       MD WILBER Bella / PRAVEENA  D: 11/28/2018 13:21     T: 11/28/2018 15:45  JOB #: [id number]

## 2018-11-28 NOTE — PERIOP NOTES
Handoff Report from Operating Room to PACU Report received from Brooklyn Reynoso RN and Rani Quiros CRNA regarding Timbo Mosley. Surgeon(s): 
Constantino Haywood MD  And Procedure(s) (LRB): 
L4-5 LAMINECTOMY, FUSION, PLIF (N/A)  confirmed  
with drains and dressings discussed. Anesthesia type, drugs, patient history, complications, estimated blood loss, vital signs, intake and output, and last pain medication, lines, reversal medications and temperature were reviewed.

## 2018-11-28 NOTE — DISCHARGE INSTRUCTIONS
SPINE DISCHARGE  INSTRUCTIONS    Bradley Jimenez. Mike Beach M.D. What can I do?  The only exercise you should do is walking. Start by walking twice a day for 5 minutes, then increase by  2-3 minutes every day until you reach 25 minutes twice a day. DO NOT sit for long periods of time (20 minutes at a time for the first couple of weeks, then gradually increase.  No heavy lifting (5 lbs max); no straining, twisting, or bending.  No driving until your physician tells you it is ok. It is, however, ok to ride short distances in a car.  If you are required to wear a back brace, you may remove it when you are sleeping unless your doctor has advised against it.  If you are required to wear a cervical collar, you must sleep in it. You can remove it only for showers. What can I eat?  You may resume your regular home diet as tolerated. If your throat is sore, you may want to eat soft food for the first few days. When can I take a shower?  You may shower leaving on your occlusive (waterproof) dressing on allowing water to run over the dressing. The dressing will fall off in 1-5 days. If it doesn't fall off, it may be removed in 5 days. The small pieces of tape (steri strips)  underneath it should stay on. Let water run over them, then pat dry gently.  Do not take baths or soak in pools.  You may remove your brace for showering. Medications:   Check with your physician before taking any anti-inflammatory medicines (Advil, Aleve, ibuprofen, aspirin).  Take prescription medicine as directed. DO NOT take more than prescribed. Call your physician if the prescribed dose is not sufficiently controlling your pain.  It is important to have regular bowel movements. Pain medications may cause constipation. Drink plenty of fluids, get enough fiber in your diet, and use stool softeners and drink prune juice to help prevent constipation.   Do not take laxatives if at all possible except in severe situations. It can result in a vicious cycle of constipation and diarrhea.  Do not put any ointments or creams on your incision unless directed to do so by your physician.  Tobacco products should be avoided during the postoperative phase. When do I see the physician again?  Please call your physicians office as soon as you get home to schedule your 1st post operative appointment. You should be seen approximately two weeks after your surgery. At this appointment you will see your doctors Assistant for a wound check and to answer any questions you may have.  You will see your physician approximately six weeks after your surgery.  If you had a fusion, you will need to get an order for xrays to be taken prior to the six week appointment. These should be done on the day of the appointment or 1-2 days before.  If you need the number to your doctors office, please request it from your nurse or see below. NOTIFY YOUR PHYSICIAN OF ANY OF THE FOLLOWING:     Fever above 101º for 24 hrs.  Nausea or vomiting.  Inability to urinate   Loss of bowel or bladder function (sudden onset of incontinence)   Changes in sensation (numbness, tingling, color change) in your extremities   Pain not relieved by your medicine.    Redness, swelling or drainage from your incision   Persistent pain in the calf of either leg   Development of severe pain      FOR APPOINTMENTS OR QUESTIONS CALL:    [facility] [address] [place] [address]  [phone]

## 2018-11-28 NOTE — BRIEF OP NOTE
BRIEF OPERATIVE NOTE Date of Procedure: 11/28/2018 Preoperative Diagnosis: LUMBAR SPONDILOLISTHESIS Postoperative Diagnosis: LUMBAR SPONDYLOLITHESIS Procedure(s): 
L4-5 LAMINECTOMY, FUSION, PLIF Surgeon(s) and Role: Valeriano Garcia MD - Primary Surgical Assistant: or staff SAnesthesia: General  
Estimated Blood Loss: 150 Specimens: * No specimens in log * Findings: good reduction of spondylolisthesis Complications: none Implants:  
Implant Name Type Inv. Item Serial No.  Lot No. LRB No. Used Action GRAFT BNE RECOMB HUM INFUSE XS --  - SN/A  GRAFT BNE RECOMB HUM INFUSE XS --  N/A [employer] SOF[employer] DANEK [id number] N/A 1 Implanted Expandable Interbody Device    N/A  [id number] N/A 1 Implanted ELEVATE [id number] X 23   NA  NA N/A 1 Implanted SCR JOSE EDUARDO 5.5 MAS 5.0X35MM CC -- CD HORIZON SOLERA - SNA  SCR JOSE EDUARDO 5.5 MAS 5.0X35MM CC -- CD HORIZON SOLERA NA [employer] SOFAM[employer] DANEK NA N/A 4 Implanted SCR SET SPNE BRK-OFF 5.5MM TI --  - SNA  SCR SET SPNE BRK-OFF 5.5MM TI --  NA [employer] SOFAMOR DANEK NA N/A 4 Implanted JOSE EDUARDO SPNE CP4 NS CRV 5.5X40MM -- CD HORIZON SOLERA - SNA  JOSE EDUARDO SPNE CP4 NS CRV 5.5X40MM -- CD HORIZON SOLERA NA [employer] SOFAMOR DANEK NA N/A 2 Implanted

## 2018-11-28 NOTE — H&P
Please see hard copy H&P in chart. I have reviewed the History and Physical report, the patient was examined and there are no interval changes that have occurred in the patient's condition since the H&P was completed.  
 
 
Gene Hernandez MD

## 2018-11-28 NOTE — ANESTHESIA PREPROCEDURE EVALUATION
Anesthetic History No history of anesthetic complications Review of Systems / Medical History Patient summary reviewed, nursing notes reviewed and pertinent labs reviewed Pulmonary Within defined limits Neuro/Psych Within defined limits Cardiovascular Hypertension GI/Hepatic/Renal 
  
GERD: well controlled Endo/Other Within defined limits Other Findings Physical Exam 
 
Airway Mallampati: II 
TM Distance: > 6 cm Neck ROM: normal range of motion Mouth opening: Normal 
 
 Cardiovascular Regular rate and rhythm,  S1 and S2 normal,  no murmur, click, rub, or gallop Dental 
No notable dental hx Pulmonary Breath sounds clear to auscultation Abdominal 
GI exam deferred Other Findings Anesthetic Plan ASA: 2 Anesthesia type: general 
 
Monitoring Plan: BIS Induction: Intravenous Anesthetic plan and risks discussed with: Patient

## 2018-11-28 NOTE — PROGRESS NOTES
Patient evaluated by physical therapy; full note to follow. Stef Long, SPT Vitals:  
 11/28/18 1635 11/28/18 1654 11/28/18 1655 11/28/18 1659 BP: 139/68 (!) 185/101 174/89 135/83 BP 1 Location: Right arm Right arm Right arm Right arm BP Patient Position: Supine Sitting Standing Supine; Post activity Pulse: 86 Resp: 16 Temp: 97.7 °F (36.5 °C) SpO2: 96% Weight:      
Height:

## 2018-11-29 VITALS
WEIGHT: 185.63 LBS | BODY MASS INDEX: 29.83 KG/M2 | DIASTOLIC BLOOD PRESSURE: 87 MMHG | HEIGHT: 66 IN | TEMPERATURE: 98.1 F | HEART RATE: 82 BPM | SYSTOLIC BLOOD PRESSURE: 143 MMHG | OXYGEN SATURATION: 95 % | RESPIRATION RATE: 16 BRPM

## 2018-11-29 LAB — HGB BLD-MCNC: 13 G/DL (ref 12.1–17)

## 2018-11-29 PROCEDURE — 36415 COLL VENOUS BLD VENIPUNCTURE: CPT

## 2018-11-29 PROCEDURE — 97165 OT EVAL LOW COMPLEX 30 MIN: CPT

## 2018-11-29 PROCEDURE — 97116 GAIT TRAINING THERAPY: CPT

## 2018-11-29 PROCEDURE — 97530 THERAPEUTIC ACTIVITIES: CPT

## 2018-11-29 PROCEDURE — 74011250637 HC RX REV CODE- 250/637: Performed by: SPECIALIST

## 2018-11-29 PROCEDURE — 74011250636 HC RX REV CODE- 250/636: Performed by: SPECIALIST

## 2018-11-29 PROCEDURE — 97535 SELF CARE MNGMENT TRAINING: CPT

## 2018-11-29 PROCEDURE — 85018 HEMOGLOBIN: CPT

## 2018-11-29 RX ADMIN — LOSARTAN POTASSIUM 50 MG: 50 TABLET ORAL at 09:50

## 2018-11-29 RX ADMIN — OXYCODONE HYDROCHLORIDE 5 MG: 5 TABLET ORAL at 09:50

## 2018-11-29 RX ADMIN — ACETAMINOPHEN 650 MG: 325 TABLET ORAL at 11:06

## 2018-11-29 RX ADMIN — ACETAMINOPHEN 650 MG: 325 TABLET ORAL at 05:27

## 2018-11-29 RX ADMIN — Medication 10 ML: at 05:21

## 2018-11-29 RX ADMIN — ACETAMINOPHEN 650 MG: 325 TABLET ORAL at 00:14

## 2018-11-29 RX ADMIN — OXYCODONE HYDROCHLORIDE 5 MG: 5 TABLET ORAL at 11:06

## 2018-11-29 RX ADMIN — Medication 2 G: at 00:14

## 2018-11-29 NOTE — PROGRESS NOTES
Problem: Mobility Impaired (Adult and Pediatric) Goal: *Acute Goals and Plan of Care (Insert Text) Physical Therapy Goals Initiated 11/28/2018 1. Patient will move from supine to sit and sit to supine , scoot up and down and roll side to side in bed with independence within 4 days. 2. Patient will perform sit to stand with independence within 4 days. 3. Patient will ambulate with independence for 656 feet with the least restrictive device within 4 days. 4. Patient will ascend/descend 13 stairs with single handrail(s) with independence within 4 days. 5. Patient will verbalize and demonstrate understanding of spinal precautions (No bending, lifting greater than 5 lbs, or twisting; log-roll technique; frequent repositioning as instructed) within 4 days. physical Therapy TREATMENT Patient: Heena Ybarra (1 y.o. male) Date: 11/29/2018 Diagnosis: LUMBAR SPONDYLOLISTHESIS, Spondylolisthesis of lumbar region S/P lumbar fusion Procedure(s) (LRB): 
L4-5 LAMINECTOMY, FUSION, PLIF (N/A) 1 Day Post-Op Precautions: Spinal, Fall Chart, physical therapy assessment, plan of care and goals were reviewed. ASSESSMENT: pt tolerated tx well, no LOB or SOB, still c/o some pain, did well with stair trng and car transfer, good motivation, vc's for safety. Progression toward goals: 
[x]      Improving appropriately and progressing toward goals 
[]      Improving slowly and progressing toward goals 
[]      Not making progress toward goals and plan of care will be adjusted PLAN: 
Patient continues to benefit from skilled intervention to address the above impairments. Continue treatment per established plan of care. Discharge Recommendations:  Outpatient when MD prescribes Further Equipment Recommendations for Discharge:  none SUBJECTIVE: The patient stated 3/3 back precautions. Reviewed all 3 with patient. OBJECTIVE DATA SUMMARY:  
Critical Behavior: 
Neurologic State: Alert, Appropriate for age Orientation Level: Oriented X4 Cognition: Follows commands Safety/Judgement: Awareness of environment Functional Mobility Training: 
Bed Mobility: 
Brace donned on arrival 
 
Transfers: 
Sit to Stand: Stand-by assistance Stand to Sit: Stand-by assistance Stand Pivot Transfers: Supervision Interventions: Verbal cues Level of Assistance: Stand-by assistance Balance: 
Sitting: Intact Standing: Intact; Without support Ambulation/Gait Training: 
Distance (ft): 200 Feet (ft) Assistive Device: Gait belt Ambulation - Level of Assistance: Stand-by assistance Gait Abnormalities: Decreased step clearance Right Side Weight Bearing: Full Left Side Weight Bearing: Full Base of Support: Narrowed Speed/Darlene: Pace decreased (<100 feet/min) Step Length: Left shortened;Right shortened Stairs: 
Number of Stairs Trained: 4 Stairs - Level of Assistance: Supervision Rail Use: Both  
 
Pain: 
Pain Scale 1: Numeric (0 - 10) Pain Intensity 1: 6 Pain Location 1: Back Pain Orientation 1: Lower Pain Description 1: Aching Pain Intervention(s) 1: Medication (see MAR); Rest;Repositioned; Ice Activity Tolerance: fair After treatment:  
[x]  Patient left in no apparent distress sitting up in chair 
[]  Patient left in no apparent distress in bed 
[x]  Call bell left within reach [x]  Nursing notified 
[x]  Caregiver present 
[]  Bed alarm activated COMMUNICATION/COLLABORATION:  
The patients plan of care was discussed with: Registered Nurse Myron Alvarado PTA Time Calculation: 25 mins

## 2018-11-29 NOTE — DISCHARGE SUMMARY
Spine Discharge Summary    Patient ID:  Brennen Campoverde  [id number]  male  50 y.o.  1970    Admit date: 11/28/2018    Discharge date: 11/29/2018    Admitting Physician: Raheem Cutler MD     Consulting Physician(s):   Treatment Team: Attending Provider: Angelic Pitts MD; Nurse Practitioner: Ricky Souza NP    Date of Surgery:   11/28/2018     Preoperative Diagnosis:  LUMBAR SPONDILOLISTHESIS    Postoperative Diagnosis:   LUMBAR SPONDYLOLITHESIS    Procedure(s):  L4-5 LAMINECTOMY, FUSION, PLIF     Anesthesia Type:   General     Surgeon: Angelic Pitts MD                            HPI:  Pt is a 50 y.o. male who has a history of LUMBAR SPONDILOLISTHESIS  with pain and limitations of activities of daily living who presents at this time for a L4-5 laminectomy and fusion following the failure of conservative management. PMH:   Past Medical History:   Diagnosis Date    Bursitis of shoulder     right; \"comes and goes\"    Essential hypertension 9/8/2017    GERD (gastroesophageal reflux disease)     Hypercholesteremia     Osteoarthritis        Body mass index is 29.96 kg/m². : A BMI > 30 is classified as obesity and > 40 is classified as morbid obesity. Medications upon admission :   Prior to Admission Medications   Prescriptions Last Dose Informant Patient Reported? Taking? MULTIVITAMINS (MULTIPLE VITAMINS PO) 11/27/2018 at Unknown time  Yes Yes   Sig: Take 1 Tab by mouth daily. One daily    docosahexanoic acid/epa (FISH OIL PO) 11/26/2018 at Unknown time  Yes Yes   Sig: Take 1 Tab by mouth daily. losartan (COZAAR) 50 mg tablet 11/27/2018 at Unknown time  No Yes   Sig: TAKE 1 TABLET DAILY   tretinoin (RETIN-A) 0.01 % topical gel 11/27/2018 at Unknown time  Yes Yes   Sig: Apply  to affected area nightly. Facility-Administered Medications: None        Allergies:  No Known Allergies     Hospital Course: The patient underwent surgery. Complications:  None; patient tolerated the procedure well. Was taken to the PACU in stable condition and then transferred to the ortho floor. Perioperative Antibiotics:  Ancef     Postoperative Pain Management:  Oxycodone      Postoperative transfusions:    Number of units banked PRBCs =   none     Post Op complications: none    Hemoglobin at discharge:    Lab Results   Component Value Date/Time    HGB 13.0 11/29/2018 04:37 AM    INR 1.0 11/20/2018 01:34 PM       Dressing was changed on POD # 1. Incision - clean, dry and intact. No significant erythema or swelling. Neurovascular exam found to be within normal limits. Wound appears to be healing without any evidence of infection. Pt had a HVAC drain that was removed on POD# 1. Physical Therapy started on the day following surgery and participated in bed mobility, transfers and ambulation. Gait:  Gait  Base of Support: Narrowed  Speed/Darlene: Pace decreased (<100 feet/min)  Step Length: Left shortened, Right shortened  Gait Abnormalities: Decreased step clearance  Ambulation - Level of Assistance: Stand-by assistance  Distance (ft): 200 Feet (ft)  Assistive Device: Gait belt  Rail Use: Both  Stairs - Level of Assistance: Supervision  Number of Stairs Trained: 4                   Discharged to: Home      Condition on Discharge:   stable    Discharge instructions:    - Take pain medications as prescribed  - Resume pre hospital diet      - Discharge activity: activity as tolerated  - Ambulate as tolerated  - Wound Care Keep wound clean and dry. See discharge instruction sheet. -DISCHARGE MEDICATION LIST     Current Discharge Medication List      START taking these medications    Details   acetaminophen (TYLENOL) 325 mg tablet Take 2 Tabs by mouth every six (6) hours for 14 days. Qty: 112 Tab, Refills: 0      oxyCODONE IR (ROXICODONE) 5 mg immediate release tablet Take 1-2 Tabs by mouth every four (4) hours as needed.  Max Daily Amount: 60 mg.  Qty: 60 Tab, Refills: 0    Associated Diagnoses: S/P lumbar fusion      polyethylene glycol (MIRALAX) 17 gram/dose powder Take 17 g by mouth daily for 15 days. Qty: 255 g, Refills: 0      senna-docusate (SENNA PLUS) 8.6-50 mg per tablet Take 1 Tab by mouth two (2) times a day. Qty: 60 Tab, Refills: 0         CONTINUE these medications which have NOT CHANGED    Details   losartan (COZAAR) 50 mg tablet TAKE 1 TABLET DAILY  Qty: 90 Tab, Refills: 3    Associated Diagnoses: Essential hypertension      tretinoin (RETIN-A) 0.01 % topical gel Apply  to affected area nightly. MULTIVITAMINS (MULTIPLE VITAMINS PO) Take 1 Tab by mouth daily. One daily          STOP taking these medications       docosahexanoic acid/epa (FISH OIL PO) Comments:   Reason for Stopping:            per medical continuation form      -Follow up in office in 2 weeks      Signed:  Monico Glover, KENTRELL-BC, ONP-C  Orthopaedic Nurse Practitioner    11/29/2018  10:27 AM

## 2018-11-29 NOTE — ROUTINE PROCESS
I have reviewed discharge instructions with the patient and spouse. The patient and spouse verbalized understanding. IV removed, Rx given.

## 2018-11-29 NOTE — PROGRESS NOTES
Ortho / Neurosurgery NP Note POD# 1  s/p L4-5 LAMINECTOMY, FUSION, PLIF Pt seen with wife at bedside and nurse at bedside Pt resting in bed. No complaints. VSS Afebrile. Voiding status: +void Labs Lab Results Component Value Date/Time HGB 13.0 11/29/2018 04:37 AM  
  
Lab Results Component Value Date/Time INR 1.0 11/20/2018 01:34 PM  
  
 
Body mass index is 29.96 kg/m². : A BMI > 30 is classified as obesity and > 40 is classified as morbid obesity. Dressing c.d.i Cryotherapy in place over incision Drain ok to be removed Calves soft and supple; No pain with passive stretch Sensation and motor intact SCDs for mechanical DVT proph while in bed PLAN: 
1) PT BID 2) Readniess for discharge: 
   [x] Vital Signs stable  
 [x] Hgb stable  
 [x] + Voiding  
 [x] Wound intact, drainage minimal  
 [x] Tolerating PO intake [x] Cleared by PT (OT if applicable) [x] Stair training completed (if applicable) [x] Independent / Contact Guard Assist (household distance) [x] Bed mobility [x] Car transfers  
  [x] ADLs [x] Adequate pain control on oral medication alone Plan home with family today Erik Gottlieb NP 
DNP, ACNP-BC, ONP-C

## 2018-11-29 NOTE — PROGRESS NOTES
Progress Note Patient: Harley Lubin MRN: [id number]  SSN: [id number] YOB: 1970  Age: 50 y.o. Sex: male Admit Date: 11/28/2018 LOS: 1 day Subjective:  
 
POD1 s/p L4-5 laminectomy / fusion PLIF by Dr. Yudith Mckay Complains of soreness at incision site Seen with Dr. Yudith Mckay this morning at bedside, sitting on edge of bed, wife present. RN at bedside removing drain, placing dry dressing Objective:  
 
Vitals:  
 11/28/18 2234 11/29/18 0400 11/29/18 0839 11/29/18 1145 BP: 119/71 135/81 127/81 143/87 Pulse: 97 96 87 82 Resp: 16 16 16 16 Temp: 98.6 °F (37 °C) 98.7 °F (37.1 °C) 98.7 °F (37.1 °C) 98.1 °F (36.7 °C) SpO2: 95% 95% 95% 95% Weight:      
Height:      
  
 
Intake and Output: 
Current Shift: 11/29 0701 - 11/29 1900 In: -  
Out: 100 [Drains:100] Last three shifts: 11/27 1901 - 11/29 0700 In: 1900 [I.V.:1900] Out: John Johnson [TVXKQ:[id number]; Drains:270] Physical Exam:  
 
Strength 5/5 BLE Sensation intact Incision C/D/I - drain removed this morning Lab/Data Review: 
Recent Results (from the past 12 hour(s)) HEMOGLOBIN Collection Time: 11/29/18  4:37 AM  
Result Value Ref Range HGB 13.0 12.1 - 17.0 g/dL Assessment:  
 
Principal Problem: S/P lumbar fusion (11/28/2018) Active Problems: 
  Spondylolisthesis of lumbar region (11/28/2018) Plan: 1. Lumbar spondylolisthesis s/p fusion: - OK to d/c drain, terrell - ADAT 
- Activity as tolerated, BLT restrictions, up with PT, LSO brace - Oxycodone, tylenol and ice therapy for pain control - SCDs for DVT ppx while in bed - Plan for DC home today with wife - Follow up in office in 2 weeks for incision check Signed By: Jeannette Cuenca NP November 29, 2018

## 2018-11-29 NOTE — PROGRESS NOTES
Bedside and Verbal shift change report given to Pradeep Arzate RN (oncoming nurse) by Toribio Phoenix (offgoing nurse). Report included the following information SBAR, Kardex, Intake/Output and MAR.

## 2019-01-16 ENCOUNTER — HOSPITAL ENCOUNTER (OUTPATIENT)
Dept: GENERAL RADIOLOGY | Age: 49
Discharge: HOME | End: 2019-01-16
Payer: COMMERCIAL

## 2019-01-16 DIAGNOSIS — M43.26 FUSION OF LUMBAR SPINE: ICD-10-CM

## 2019-01-16 PROCEDURE — 72100 X-RAY EXAM L-S SPINE 2/3 VWS: CPT

## 2019-01-17 DIAGNOSIS — I10 ESSENTIAL HYPERTENSION: ICD-10-CM

## 2019-01-17 RX ORDER — LOSARTAN POTASSIUM 50 MG/1
TABLET ORAL
Qty: 90 TAB | Refills: 3 | Status: SHIPPED | OUTPATIENT
Start: 2019-01-17 | End: 2020-01-06

## 2019-03-15 ENCOUNTER — OFFICE VISIT (OUTPATIENT)
Dept: INTERNAL MEDICINE CLINIC | Age: 49
End: 2019-03-15

## 2019-03-15 VITALS
SYSTOLIC BLOOD PRESSURE: 134 MMHG | WEIGHT: 193 LBS | RESPIRATION RATE: 16 BRPM | OXYGEN SATURATION: 98 % | HEART RATE: 71 BPM | DIASTOLIC BLOOD PRESSURE: 92 MMHG | BODY MASS INDEX: 31.02 KG/M2 | TEMPERATURE: 97.8 F | HEIGHT: 66 IN

## 2019-03-15 DIAGNOSIS — E78.00 HYPERCHOLESTEREMIA: ICD-10-CM

## 2019-03-15 DIAGNOSIS — I10 ESSENTIAL HYPERTENSION: Primary | ICD-10-CM

## 2019-03-15 DIAGNOSIS — K21.9 GASTROESOPHAGEAL REFLUX DISEASE WITHOUT ESOPHAGITIS: ICD-10-CM

## 2019-03-15 NOTE — PROGRESS NOTES
Reviewed record in preparation for visit and have obtained necessary documentation. Identified pt with two pt identifiers(name and ). Chief Complaint   Patient presents with    Follow-up     6 month       There are no preventive care reminders to display for this patient. Mr. Soniya Hernandez has a reminder for a \"due or due soon\" health maintenance. I have asked that he discuss health maintenance topic(s) due with His  primary care provider. Coordination of Care Questionnaire:  :     1) Have you been to an emergency room, urgent care clinic since your last visit? no   Hospitalized since your last visit? no             2) Have you seen or consulted any other health care providers outside of [address] [place] [address] since your last visit? no  (Include any pap smears or colon screenings in this section.)    3) Do you have an Advance Directive on file? no    4) Are you interested in receiving information on Advance Directives? NO    Patient is accompanied by self I have received verbal consent from Blanka Houston to discuss any/all medical information while they are present in the room.

## 2019-03-15 NOTE — PROGRESS NOTES
SUBJECTIVE:   Mr. Betty Gonzalez is a 50 y.o. male who is here for follow up of routine medical issues. Chief Complaint   Patient presents with    Follow-up     6 month     He had spinal fusion Dr. Gume Jaramillo in November 2018. Doing fine. At this time, he is otherwise doing well and has brought no other complaints to my attention today. For a list of the medical issues addressed today, see the assessment and plan below. PMH:   Past Medical History:   Diagnosis Date    Bursitis of shoulder     right; \"comes and goes\"    Essential hypertension 9/8/2017    GERD (gastroesophageal reflux disease)     Hypercholesteremia     Osteoarthritis        PSH:   Past Surgical History:   Procedure Laterality Date    HX ACL RECONSTRUCTION Left     HX HERNIA REPAIR      HX OTHER SURGICAL      INGROWN TOE NAIL REMOVED OFF L big toe     HX OTHER SURGICAL  03/2018    orthoscopy--R hip    HX REFRACTIVE SURGERY      HX SHOULDER ARTHROSCOPY  3/01/2012    Right shoulder     HX SHOULDER ARTHROSCOPY  04/2014    R shoulder     HX VASECTOMY         All: He has No Known Allergies. MEDS:   Current Outpatient Medications   Medication Sig    losartan (COZAAR) 50 mg tablet TAKE 1 TABLET DAILY    tretinoin (RETIN-A) 0.01 % topical gel Apply  to affected area nightly.  MULTIVITAMINS (MULTIPLE VITAMINS PO) Take 1 Tab by mouth daily. One daily     oxyCODONE IR (ROXICODONE) 5 mg immediate release tablet Take 1-2 Tabs by mouth every four (4) hours as needed. Max Daily Amount: 60 mg.    senna-docusate (SENNA PLUS) 8.6-50 mg per tablet Take 1 Tab by mouth two (2) times a day. No current facility-administered medications for this visit. FH: Father has a hematologic disorder. Mother has sciatica. Two sisters alive and well. SH: . Lifts weight. He reports that  has never smoked. he has never used smokeless tobacco. He reports that he does not drink alcohol or use drugs. ROS: See above;  Complete ROS otherwise negative. OBJECTIVE:   Vitals:   Visit Vitals  BP (!) 134/92 (BP 1 Location: Left arm, BP Patient Position: Sitting)   Pulse 71   Temp 97.8 °F (36.6 °C) (Oral)   Resp 16   Ht 5' 6\" (1.676 m)   Wt 193 lb (87.5 kg)   SpO2 98%   BMI 31.15 kg/m²      Gen: Pleasant 50 y.o.  male in NAD. HEENT: PERRLA. EOMI. OP moist and pink. Neck: Supple. No LAD. HEART: RRR, No M/G/R.    LUNGS: CTAB No W/R. ABDOMEN: S, NT, ND, BS+. EXTREMITIES: Warm. No C/C/E.  MUSCULOSKELETAL: Normal ROM, muscle strength 5/5 all groups. R knee crepitus with flexion and extension. NEURO: Alert and oriented x 3. Cranial nerves grossly intact. No focal sensory or motor deficits noted. SKIN: Warm. Dry. No rashes or other lesions noted. ASSESSMENT/ PLAN:   1. Hypertension: Borderline; previously controlled. 2. Hypercholesterolemia: Due for recheck. - METABOLIC PANEL, COMPREHENSIVE  - LIPID PANEL  3. GERD (gastroesophageal reflux disease): Currently quiescent. Off meds. 4. Weight: Diet and exercise. I have reviewed the patient's medications and risks/side effects/benefits were discussed. Diagnosis(-es) explained to patient and questions answered. Literature provided where appropriate. Follow-up Disposition:  Return in about 6 months (around 9/15/2019) for CPE.

## 2019-03-16 LAB
ALBUMIN SERPL-MCNC: 4.4 G/DL (ref 3.5–5.5)
ALBUMIN/GLOB SERPL: 1.8 {RATIO} (ref 1.2–2.2)
ALP SERPL-CCNC: 75 IU/L (ref 39–117)
ALT SERPL-CCNC: 25 IU/L (ref 0–44)
AST SERPL-CCNC: 15 IU/L (ref 0–40)
BILIRUB SERPL-MCNC: 0.6 MG/DL (ref 0–1.2)
BUN SERPL-MCNC: 12 MG/DL (ref 6–24)
BUN/CREAT SERPL: 10 (ref 9–20)
CALCIUM SERPL-MCNC: 9.5 MG/DL (ref 8.7–10.2)
CHLORIDE SERPL-SCNC: 105 MMOL/L (ref 96–106)
CHOLEST SERPL-MCNC: 221 MG/DL (ref 100–199)
CO2 SERPL-SCNC: 24 MMOL/L (ref 20–29)
CREAT SERPL-MCNC: 1.2 MG/DL (ref 0.76–1.27)
GLOBULIN SER CALC-MCNC: 2.5 G/DL (ref 1.5–4.5)
GLUCOSE SERPL-MCNC: 93 MG/DL (ref 65–99)
HDLC SERPL-MCNC: 43 MG/DL
LDLC SERPL CALC-MCNC: 133 MG/DL (ref 0–99)
POTASSIUM SERPL-SCNC: 4.4 MMOL/L (ref 3.5–5.2)
PROT SERPL-MCNC: 6.9 G/DL (ref 6–8.5)
SODIUM SERPL-SCNC: 142 MMOL/L (ref 134–144)
TRIGL SERPL-MCNC: 227 MG/DL (ref 0–149)
VLDLC SERPL CALC-MCNC: 45 MG/DL (ref 5–40)

## 2019-03-19 ENCOUNTER — TELEPHONE (OUTPATIENT)
Dept: INTERNAL MEDICINE CLINIC | Age: 49
End: 2019-03-19

## 2019-03-20 ENCOUNTER — HOSPITAL ENCOUNTER (OUTPATIENT)
Dept: GENERAL RADIOLOGY | Age: 49
Discharge: HOME | End: 2019-03-20
Payer: COMMERCIAL

## 2019-03-20 DIAGNOSIS — M43.26 FUSION OF LUMBAR SPINE: ICD-10-CM

## 2019-03-20 PROCEDURE — 72110 X-RAY EXAM L-2 SPINE 4/>VWS: CPT

## 2019-07-24 ENCOUNTER — HOSPITAL ENCOUNTER (OUTPATIENT)
Dept: GENERAL RADIOLOGY | Age: 49
Discharge: HOME | End: 2019-07-24
Payer: COMMERCIAL

## 2019-07-24 DIAGNOSIS — M43.26 FUSION OF LUMBAR SPINE: ICD-10-CM

## 2019-07-24 PROCEDURE — 72110 X-RAY EXAM L-2 SPINE 4/>VWS: CPT

## 2019-09-20 ENCOUNTER — OFFICE VISIT (OUTPATIENT)
Dept: INTERNAL MEDICINE CLINIC | Age: 49
End: 2019-09-20

## 2019-09-20 VITALS
RESPIRATION RATE: 18 BRPM | BODY MASS INDEX: 29.18 KG/M2 | WEIGHT: 181.6 LBS | TEMPERATURE: 97.7 F | HEART RATE: 66 BPM | OXYGEN SATURATION: 96 % | HEIGHT: 66 IN | SYSTOLIC BLOOD PRESSURE: 121 MMHG | DIASTOLIC BLOOD PRESSURE: 76 MMHG

## 2019-09-20 DIAGNOSIS — E78.00 HYPERCHOLESTEREMIA: ICD-10-CM

## 2019-09-20 DIAGNOSIS — I10 ESSENTIAL HYPERTENSION: ICD-10-CM

## 2019-09-20 DIAGNOSIS — M25.562 LEFT KNEE PAIN, UNSPECIFIED CHRONICITY: Primary | ICD-10-CM

## 2019-09-20 DIAGNOSIS — K21.9 GASTROESOPHAGEAL REFLUX DISEASE WITHOUT ESOPHAGITIS: ICD-10-CM

## 2019-09-20 NOTE — PROGRESS NOTES
SUBJECTIVE:   Mr. Matilde Clark is a 50 y.o. male who is here for follow up of routine medical issues. Chief Complaint   Patient presents with    Complete Physical     pt here today to annual physical    Immunization/Injection     pt refused getting flu shot today, stating he will get shot at a later date     L knee started giving out on him. \"I'm able to stop before I fall. \"   Back is fine. He had spinal fusion Dr. Alejandro Carbajal in November 2018. At this time, he is otherwise doing well and has brought no other complaints to my attention today. For a list of the medical issues addressed today, see the assessment and plan below. PMH:   Past Medical History:   Diagnosis Date    Bursitis of shoulder     right; \"comes and goes\"    Essential hypertension 9/8/2017    GERD (gastroesophageal reflux disease)     Hypercholesteremia     Osteoarthritis        PSH:   Past Surgical History:   Procedure Laterality Date    HX ACL RECONSTRUCTION Left     HX HERNIA REPAIR      HX LUMBAR LAMINECTOMY  2018    Dr. Alejandro Carbajal; L4-5 level.  HX OTHER SURGICAL      INGROWN TOE NAIL REMOVED OFF L big toe     HX OTHER SURGICAL  03/2018    orthoscopy--R hip    HX REFRACTIVE SURGERY      HX SHOULDER ARTHROSCOPY  3/01/2012    Right shoulder     HX SHOULDER ARTHROSCOPY  04/2014    R shoulder     HX VASECTOMY         All: He has No Known Allergies. MEDS:   Current Outpatient Medications   Medication Sig    losartan (COZAAR) 50 mg tablet TAKE 1 TABLET DAILY    tretinoin (RETIN-A) 0.01 % topical gel Apply  to affected area nightly.  MULTIVITAMINS (MULTIPLE VITAMINS PO) Take 1 Tab by mouth daily. One daily      No current facility-administered medications for this visit. FH: Father has a hematologic disorder. Mother has sciatica. Two sisters alive and well. SH: . Lifts weight. He reports that he has never smoked.  He has never used smokeless tobacco. He reports that he does not drink alcohol or use drugs.   ROS: See above; Complete ROS otherwise negative. OBJECTIVE:   Vitals:   Visit Vitals  /76 (BP 1 Location: Left arm, BP Patient Position: Sitting)   Pulse 66   Temp 97.7 °F (36.5 °C) (Oral)   Resp 18   Ht 5' 6\" (1.676 m)   Wt 181 lb 9.6 oz (82.4 kg)   SpO2 96%   BMI 29.31 kg/m²      Gen: Pleasant 50 y.o.  male in NAD. HEENT: PERRLA. EOMI. OP moist and pink. Neck: Supple. No LAD. HEART: RRR, No M/G/R.    LUNGS: CTAB No W/R. ABDOMEN: S, NT, ND, BS+. EXTREMITIES: Warm. No C/C/E.  MUSCULOSKELETAL: Normal ROM, muscle strength 5/5 all groups. L knee crepitus with flexion and extension. NEURO: Alert and oriented x 3. Cranial nerves grossly intact. No focal sensory or motor deficits noted. SKIN: Warm. Dry. No rashes or other lesions noted. ASSESSMENT/ PLAN:   1. Hypertension: BP fine; previously controlled. 2. Hypercholesterolemia: Due for recheck. - METABOLIC PANEL, COMPREHENSIVE  - LIPID PANEL  3. GERD (gastroesophageal reflux disease): Currently quiescent. Off meds. 4. Weight: Diet and exercise. I have reviewed the patient's medications and risks/side effects/benefits were discussed. Diagnosis(-es) explained to patient and questions answered. Literature provided where appropriate. Follow-up and Dispositions    · Return in about 6 months (around 3/20/2020) for HTN. Discussed the patient's BMI with him. The BMI follow up plan is as follows:     dietary management education, guidance, and counseling  encourage exercise  monitor weight  prescribed dietary intake    An After Visit Summary was printed and given to the patient.

## 2019-09-20 NOTE — PATIENT INSTRUCTIONS
Body Mass Index: Care Instructions  Your Care Instructions    Body mass index (BMI) can help you see if your weight is raising your risk for health problems. It uses a formula to compare how much you weigh with how tall you are. · A BMI lower than 18.5 is considered underweight. · A BMI between 18.5 and 24.9 is considered healthy. · A BMI between 25 and 29.9 is considered overweight. A BMI of 30 or higher is considered obese. If your BMI is in the normal range, it means that you have a lower risk for weight-related health problems. If your BMI is in the overweight or obese range, you may be at increased risk for weight-related health problems, such as high blood pressure, heart disease, stroke, arthritis or joint pain, and diabetes. If your BMI is in the underweight range, you may be at increased risk for health problems such as fatigue, lower protection (immunity) against illness, muscle loss, bone loss, hair loss, and hormone problems. BMI is just one measure of your risk for weight-related health problems. You may be at higher risk for health problems if you are not active, you eat an unhealthy diet, or you drink too much alcohol or use tobacco products. Follow-up care is a key part of your treatment and safety. Be sure to make and go to all appointments, and call your doctor if you are having problems. It's also a good idea to know your test results and keep a list of the medicines you take. How can you care for yourself at home? · Practice healthy eating habits. This includes eating plenty of fruits, vegetables, whole grains, lean protein, and low-fat dairy. · If your doctor recommends it, get more exercise. Walking is a good choice. Bit by bit, increase the amount you walk every day. Try for at least 30 minutes on most days of the week. · Do not smoke. Smoking can increase your risk for health problems. If you need help quitting, talk to your doctor about stop-smoking programs and medicines. These can increase your chances of quitting for good. · Limit alcohol to 2 drinks a day for men and 1 drink a day for women. Too much alcohol can cause health problems. If you have a BMI higher than 25  · Your doctor may do other tests to check your risk for weight-related health problems. This may include measuring the distance around your waist. A waist measurement of more than 40 inches in men or 35 inches in women can increase the risk of weight-related health problems. · Talk with your doctor about steps you can take to stay healthy or improve your health. You may need to make lifestyle changes to lose weight and stay healthy, such as changing your diet and getting regular exercise. If you have a BMI lower than 18.5  · Your doctor may do other tests to check your risk for health problems. · Talk with your doctor about steps you can take to stay healthy or improve your health. You may need to make lifestyle changes to gain or maintain weight and stay healthy, such as getting more healthy foods in your diet and doing exercises to build muscle. Where can you learn more? Go to http://erlin-neha.info/. Enter S176 in the search box to learn more about \"Body Mass Index: Care Instructions. \"  Current as of: October 13, 2016  Content Version: 11.4  © 4020-4038 [employer]. Care instructions adapted under license by [employer] (which disclaims liability or warranty for this information). If you have questions about a medical condition or this instruction, always ask your healthcare professional. [address] any warranty or liability for your use of this information.

## 2019-09-21 LAB
ALBUMIN SERPL-MCNC: 4.5 G/DL (ref 3.5–5.5)
ALBUMIN/GLOB SERPL: 2 {RATIO} (ref 1.2–2.2)
ALP SERPL-CCNC: 68 IU/L (ref 39–117)
ALT SERPL-CCNC: 20 IU/L (ref 0–44)
AST SERPL-CCNC: 16 IU/L (ref 0–40)
BASOPHILS # BLD AUTO: 0.1 X10E3/UL (ref 0–0.2)
BASOPHILS NFR BLD AUTO: 2 %
BILIRUB SERPL-MCNC: 1.2 MG/DL (ref 0–1.2)
BUN SERPL-MCNC: 7 MG/DL (ref 6–24)
BUN/CREAT SERPL: 6 (ref 9–20)
CALCIUM SERPL-MCNC: 9.8 MG/DL (ref 8.7–10.2)
CHLORIDE SERPL-SCNC: 102 MMOL/L (ref 96–106)
CHOLEST SERPL-MCNC: 214 MG/DL (ref 100–199)
CO2 SERPL-SCNC: 25 MMOL/L (ref 20–29)
CREAT SERPL-MCNC: 1.16 MG/DL (ref 0.76–1.27)
EOSINOPHIL # BLD AUTO: 0.4 X10E3/UL (ref 0–0.4)
EOSINOPHIL NFR BLD AUTO: 8 %
ERYTHROCYTE [DISTWIDTH] IN BLOOD BY AUTOMATED COUNT: 12.8 % (ref 12.3–15.4)
GLOBULIN SER CALC-MCNC: 2.2 G/DL (ref 1.5–4.5)
GLUCOSE SERPL-MCNC: 83 MG/DL (ref 65–99)
HCT VFR BLD AUTO: 44.3 % (ref 37.5–51)
HDLC SERPL-MCNC: 49 MG/DL
HGB BLD-MCNC: 15.3 G/DL (ref 13–17.7)
IMM GRANULOCYTES # BLD AUTO: 0 X10E3/UL (ref 0–0.1)
IMM GRANULOCYTES NFR BLD AUTO: 0 %
LDLC SERPL CALC-MCNC: 129 MG/DL (ref 0–99)
LYMPHOCYTES # BLD AUTO: 1.6 X10E3/UL (ref 0.7–3.1)
LYMPHOCYTES NFR BLD AUTO: 35 %
MCH RBC QN AUTO: 30.3 PG (ref 26.6–33)
MCHC RBC AUTO-ENTMCNC: 34.5 G/DL (ref 31.5–35.7)
MCV RBC AUTO: 88 FL (ref 79–97)
MONOCYTES # BLD AUTO: 0.4 X10E3/UL (ref 0.1–0.9)
MONOCYTES NFR BLD AUTO: 8 %
NEUTROPHILS # BLD AUTO: 2.1 X10E3/UL (ref 1.4–7)
NEUTROPHILS NFR BLD AUTO: 47 %
PLATELET # BLD AUTO: 262 X10E3/UL (ref 150–450)
POTASSIUM SERPL-SCNC: 4.8 MMOL/L (ref 3.5–5.2)
PROT SERPL-MCNC: 6.7 G/DL (ref 6–8.5)
RBC # BLD AUTO: 5.05 X10E6/UL (ref 4.14–5.8)
SODIUM SERPL-SCNC: 143 MMOL/L (ref 134–144)
TRIGL SERPL-MCNC: 181 MG/DL (ref 0–149)
VLDLC SERPL CALC-MCNC: 36 MG/DL (ref 5–40)
WBC # BLD AUTO: 4.5 X10E3/UL (ref 3.4–10.8)

## 2019-09-23 ENCOUNTER — TELEPHONE (OUTPATIENT)
Dept: INTERNAL MEDICINE CLINIC | Age: 49
End: 2019-09-23

## 2020-01-06 DIAGNOSIS — I10 ESSENTIAL HYPERTENSION: ICD-10-CM

## 2020-01-06 RX ORDER — LOSARTAN POTASSIUM 50 MG/1
TABLET ORAL
Qty: 90 TAB | Refills: 3 | Status: SHIPPED | OUTPATIENT
Start: 2020-01-06 | End: 2020-07-16

## 2020-02-19 ENCOUNTER — HOSPITAL ENCOUNTER (OUTPATIENT)
Dept: GENERAL RADIOLOGY | Age: 50
Discharge: HOME | End: 2020-02-19
Payer: COMMERCIAL

## 2020-02-19 DIAGNOSIS — M54.12 CERVICAL RADICULOPATHY: ICD-10-CM

## 2020-02-19 PROCEDURE — 72050 X-RAY EXAM NECK SPINE 4/5VWS: CPT

## 2020-07-14 PROCEDURE — 74011636320 HC RX REV CODE- 636/320: Performed by: INTERNAL MEDICINE

## 2020-07-15 ENCOUNTER — HOSPITAL ENCOUNTER (INPATIENT)
Age: 50
LOS: 1 days | Discharge: HOME | DRG: 247 | End: 2020-07-16
Attending: EMERGENCY MEDICINE | Admitting: INTERNAL MEDICINE
Payer: COMMERCIAL

## 2020-07-15 ENCOUNTER — APPOINTMENT (OUTPATIENT)
Dept: NON INVASIVE DIAGNOSTICS | Age: 50
DRG: 247 | End: 2020-07-15
Attending: NURSE PRACTITIONER
Payer: COMMERCIAL

## 2020-07-15 ENCOUNTER — APPOINTMENT (OUTPATIENT)
Dept: GENERAL RADIOLOGY | Age: 50
DRG: 247 | End: 2020-07-15
Attending: EMERGENCY MEDICINE
Payer: COMMERCIAL

## 2020-07-15 DIAGNOSIS — I21.3 ST ELEVATION MYOCARDIAL INFARCTION (STEMI), UNSPECIFIED ARTERY (HCC): ICD-10-CM

## 2020-07-15 DIAGNOSIS — R07.9 CHEST PAIN, UNSPECIFIED TYPE: ICD-10-CM

## 2020-07-15 PROBLEM — E78.5 DYSLIPIDEMIA: Status: ACTIVE | Noted: 2020-07-15

## 2020-07-15 PROBLEM — I21.11 STEMI INVOLVING RIGHT CORONARY ARTERY (HCC): Status: ACTIVE | Noted: 2020-07-15

## 2020-07-15 PROBLEM — I21.19 ACUTE ST ELEVATION MYOCARDIAL INFARCTION (STEMI) OF INFERIOR WALL (HCC): Status: ACTIVE | Noted: 2020-07-15

## 2020-07-15 LAB
ALBUMIN SERPL-MCNC: 3.6 G/DL (ref 3.5–5)
ALBUMIN/GLOB SERPL: 1.1 {RATIO} (ref 1.1–2.2)
ALP SERPL-CCNC: 88 U/L (ref 45–117)
ALT SERPL-CCNC: 29 U/L (ref 12–78)
ANION GAP SERPL CALC-SCNC: 4 MMOL/L (ref 5–15)
ANION GAP SERPL CALC-SCNC: 6 MMOL/L (ref 5–15)
AST SERPL-CCNC: 19 U/L (ref 15–37)
BASOPHILS # BLD: 0 K/UL (ref 0–0.1)
BASOPHILS # BLD: 0.1 K/UL (ref 0–0.1)
BASOPHILS NFR BLD: 0 % (ref 0–1)
BASOPHILS NFR BLD: 1 % (ref 0–1)
BILIRUB SERPL-MCNC: 0.9 MG/DL (ref 0.2–1)
BUN SERPL-MCNC: 18 MG/DL (ref 6–20)
BUN SERPL-MCNC: 19 MG/DL (ref 6–20)
BUN/CREAT SERPL: 16 (ref 12–20)
BUN/CREAT SERPL: 16 (ref 12–20)
CALCIUM SERPL-MCNC: 8.4 MG/DL (ref 8.5–10.1)
CALCIUM SERPL-MCNC: 8.6 MG/DL (ref 8.5–10.1)
CHLORIDE SERPL-SCNC: 105 MMOL/L (ref 97–108)
CHLORIDE SERPL-SCNC: 106 MMOL/L (ref 97–108)
CHOLEST SERPL-MCNC: 180 MG/DL
CO2 SERPL-SCNC: 28 MMOL/L (ref 21–32)
CO2 SERPL-SCNC: 30 MMOL/L (ref 21–32)
CREAT SERPL-MCNC: 1.15 MG/DL (ref 0.7–1.3)
CREAT SERPL-MCNC: 1.18 MG/DL (ref 0.7–1.3)
DIFFERENTIAL METHOD BLD: ABNORMAL
DIFFERENTIAL METHOD BLD: ABNORMAL
EOSINOPHIL # BLD: 0.1 K/UL (ref 0–0.4)
EOSINOPHIL # BLD: 0.3 K/UL (ref 0–0.4)
EOSINOPHIL NFR BLD: 1 % (ref 0–7)
EOSINOPHIL NFR BLD: 4 % (ref 0–7)
ERYTHROCYTE [DISTWIDTH] IN BLOOD BY AUTOMATED COUNT: 12.2 % (ref 11.5–14.5)
ERYTHROCYTE [DISTWIDTH] IN BLOOD BY AUTOMATED COUNT: 12.2 % (ref 11.5–14.5)
GLOBULIN SER CALC-MCNC: 3.3 G/DL (ref 2–4)
GLUCOSE SERPL-MCNC: 117 MG/DL (ref 65–100)
GLUCOSE SERPL-MCNC: 133 MG/DL (ref 65–100)
HCT VFR BLD AUTO: 41.1 % (ref 36.6–50.3)
HCT VFR BLD AUTO: 42.2 % (ref 36.6–50.3)
HDLC SERPL-MCNC: 46 MG/DL
HDLC SERPL: 3.9 {RATIO} (ref 0–5)
HGB BLD-MCNC: 14.1 G/DL (ref 12.1–17)
HGB BLD-MCNC: 14.5 G/DL (ref 12.1–17)
IMM GRANULOCYTES # BLD AUTO: 0 K/UL (ref 0–0.04)
IMM GRANULOCYTES # BLD AUTO: 0.1 K/UL (ref 0–0.04)
IMM GRANULOCYTES NFR BLD AUTO: 1 % (ref 0–0.5)
IMM GRANULOCYTES NFR BLD AUTO: 1 % (ref 0–0.5)
LDLC SERPL CALC-MCNC: 106.8 MG/DL (ref 0–100)
LIPID PROFILE,FLP: ABNORMAL
LYMPHOCYTES # BLD: 1 K/UL (ref 0.8–3.5)
LYMPHOCYTES # BLD: 3 K/UL (ref 0.8–3.5)
LYMPHOCYTES NFR BLD: 36 % (ref 12–49)
LYMPHOCYTES NFR BLD: 9 % (ref 12–49)
MCH RBC QN AUTO: 30.5 PG (ref 26–34)
MCH RBC QN AUTO: 30.5 PG (ref 26–34)
MCHC RBC AUTO-ENTMCNC: 34.3 G/DL (ref 30–36.5)
MCHC RBC AUTO-ENTMCNC: 34.4 G/DL (ref 30–36.5)
MCV RBC AUTO: 88.7 FL (ref 80–99)
MCV RBC AUTO: 89 FL (ref 80–99)
MONOCYTES # BLD: 0.3 K/UL (ref 0–1)
MONOCYTES # BLD: 0.6 K/UL (ref 0–1)
MONOCYTES NFR BLD: 3 % (ref 5–13)
MONOCYTES NFR BLD: 7 % (ref 5–13)
NEUTS SEG # BLD: 4.3 K/UL (ref 1.8–8)
NEUTS SEG # BLD: 9.7 K/UL (ref 1.8–8)
NEUTS SEG NFR BLD: 51 % (ref 32–75)
NEUTS SEG NFR BLD: 86 % (ref 32–75)
NRBC # BLD: 0 K/UL (ref 0–0.01)
NRBC # BLD: 0 K/UL (ref 0–0.01)
NRBC BLD-RTO: 0 PER 100 WBC
NRBC BLD-RTO: 0 PER 100 WBC
PLATELET # BLD AUTO: 231 K/UL (ref 150–400)
PLATELET # BLD AUTO: 259 K/UL (ref 150–400)
PMV BLD AUTO: 10.1 FL (ref 8.9–12.9)
PMV BLD AUTO: 10.2 FL (ref 8.9–12.9)
POTASSIUM SERPL-SCNC: 3.5 MMOL/L (ref 3.5–5.1)
POTASSIUM SERPL-SCNC: 3.6 MMOL/L (ref 3.5–5.1)
PROT SERPL-MCNC: 6.9 G/DL (ref 6.4–8.2)
RBC # BLD AUTO: 4.62 M/UL (ref 4.1–5.7)
RBC # BLD AUTO: 4.76 M/UL (ref 4.1–5.7)
SODIUM SERPL-SCNC: 139 MMOL/L (ref 136–145)
SODIUM SERPL-SCNC: 140 MMOL/L (ref 136–145)
TRIGL SERPL-MCNC: 136 MG/DL (ref ?–150)
TROPONIN I BLD-MCNC: <0.04 NG/ML (ref 0–0.08)
TROPONIN I SERPL-MCNC: 25 NG/ML
TROPONIN I SERPL-MCNC: <0.05 NG/ML
VLDLC SERPL CALC-MCNC: 27.2 MG/DL
WBC # BLD AUTO: 11.1 K/UL (ref 4.1–11.1)
WBC # BLD AUTO: 8.3 K/UL (ref 4.1–11.1)

## 2020-07-15 PROCEDURE — C1769 GUIDE WIRE: HCPCS | Performed by: INTERNAL MEDICINE

## 2020-07-15 PROCEDURE — C1874 STENT, COATED/COV W/DEL SYS: HCPCS | Performed by: INTERNAL MEDICINE

## 2020-07-15 PROCEDURE — 74011250637 HC RX REV CODE- 250/637: Performed by: INTERNAL MEDICINE

## 2020-07-15 PROCEDURE — 71045 X-RAY EXAM CHEST 1 VIEW: CPT

## 2020-07-15 PROCEDURE — 74011250636 HC RX REV CODE- 250/636: Performed by: EMERGENCY MEDICINE

## 2020-07-15 PROCEDURE — 80061 LIPID PANEL: CPT

## 2020-07-15 PROCEDURE — 99153 MOD SED SAME PHYS/QHP EA: CPT | Performed by: INTERNAL MEDICINE

## 2020-07-15 PROCEDURE — 96374 THER/PROPH/DIAG INJ IV PUSH: CPT

## 2020-07-15 PROCEDURE — 93005 ELECTROCARDIOGRAM TRACING: CPT

## 2020-07-15 PROCEDURE — 77030019569 HC BND COMPR RAD TERU -B: Performed by: INTERNAL MEDICINE

## 2020-07-15 PROCEDURE — 74011250637 HC RX REV CODE- 250/637: Performed by: NURSE PRACTITIONER

## 2020-07-15 PROCEDURE — 74011250636 HC RX REV CODE- 250/636: Performed by: INTERNAL MEDICINE

## 2020-07-15 PROCEDURE — 77030019698 HC SYR ANGI MDLON MRTM -A: Performed by: INTERNAL MEDICINE

## 2020-07-15 PROCEDURE — C1725 CATH, TRANSLUMIN NON-LASER: HCPCS | Performed by: INTERNAL MEDICINE

## 2020-07-15 PROCEDURE — 74011250637 HC RX REV CODE- 250/637: Performed by: EMERGENCY MEDICINE

## 2020-07-15 PROCEDURE — 80053 COMPREHEN METABOLIC PANEL: CPT

## 2020-07-15 PROCEDURE — 93306 TTE W/DOPPLER COMPLETE: CPT

## 2020-07-15 PROCEDURE — 36415 COLL VENOUS BLD VENIPUNCTURE: CPT

## 2020-07-15 PROCEDURE — 77030010221 HC SPLNT WR POS TELE -B: Performed by: INTERNAL MEDICINE

## 2020-07-15 PROCEDURE — C1887 CATHETER, GUIDING: HCPCS | Performed by: INTERNAL MEDICINE

## 2020-07-15 PROCEDURE — C1894 INTRO/SHEATH, NON-LASER: HCPCS | Performed by: INTERNAL MEDICINE

## 2020-07-15 PROCEDURE — 77030028837 HC SYR ANGI PWR INJ COEU -A: Performed by: INTERNAL MEDICINE

## 2020-07-15 PROCEDURE — 74011636320 HC RX REV CODE- 636/320: Performed by: INTERNAL MEDICINE

## 2020-07-15 PROCEDURE — 77030004549 HC CATH ANGI DX PRF MRTM -A: Performed by: INTERNAL MEDICINE

## 2020-07-15 PROCEDURE — 65660000000 HC RM CCU STEPDOWN

## 2020-07-15 PROCEDURE — 84484 ASSAY OF TROPONIN QUANT: CPT

## 2020-07-15 PROCEDURE — 74011000250 HC RX REV CODE- 250: Performed by: INTERNAL MEDICINE

## 2020-07-15 PROCEDURE — 74011000258 HC RX REV CODE- 258: Performed by: INTERNAL MEDICINE

## 2020-07-15 PROCEDURE — 76937 US GUIDE VASCULAR ACCESS: CPT | Performed by: INTERNAL MEDICINE

## 2020-07-15 PROCEDURE — 77030008543 HC TBNG MON PRSS MRTM -A: Performed by: INTERNAL MEDICINE

## 2020-07-15 PROCEDURE — 99285 EMERGENCY DEPT VISIT HI MDM: CPT

## 2020-07-15 PROCEDURE — 74011000250 HC RX REV CODE- 250: Performed by: EMERGENCY MEDICINE

## 2020-07-15 PROCEDURE — 99152 MOD SED SAME PHYS/QHP 5/>YRS: CPT | Performed by: INTERNAL MEDICINE

## 2020-07-15 PROCEDURE — 77030015766: Performed by: INTERNAL MEDICINE

## 2020-07-15 PROCEDURE — 92928 PRQ TCAT PLMT NTRAC ST 1 LES: CPT | Performed by: INTERNAL MEDICINE

## 2020-07-15 PROCEDURE — 93460 R&L HRT ART/VENTRICLE ANGIO: CPT | Performed by: INTERNAL MEDICINE

## 2020-07-15 PROCEDURE — 85025 COMPLETE CBC W/AUTO DIFF WBC: CPT

## 2020-07-15 DEVICE — XIENCE SIERRA™ EVEROLIMUS ELUTING CORONARY STENT SYSTEM 3.25 MM X 23 MM / RAPID-EXCHANGE
Type: IMPLANTABLE DEVICE | Status: FUNCTIONAL
Brand: XIENCE SIERRA™

## 2020-07-15 RX ORDER — HEPARIN SODIUM 200 [USP'U]/100ML
INJECTION, SOLUTION INTRAVENOUS
Status: COMPLETED | OUTPATIENT
Start: 2020-07-15 | End: 2020-07-15

## 2020-07-15 RX ORDER — ROSUVASTATIN CALCIUM 10 MG/1
20 TABLET, COATED ORAL DAILY
Status: DISCONTINUED | OUTPATIENT
Start: 2020-07-15 | End: 2020-07-16 | Stop reason: HOSPADM

## 2020-07-15 RX ORDER — GUAIFENESIN 100 MG/5ML
325 LIQUID (ML) ORAL
Status: COMPLETED | OUTPATIENT
Start: 2020-07-15 | End: 2020-07-15

## 2020-07-15 RX ORDER — SODIUM CHLORIDE 0.9 % (FLUSH) 0.9 %
5-40 SYRINGE (ML) INJECTION AS NEEDED
Status: DISCONTINUED | OUTPATIENT
Start: 2020-07-15 | End: 2020-07-16 | Stop reason: HOSPADM

## 2020-07-15 RX ORDER — GUAIFENESIN 100 MG/5ML
81 LIQUID (ML) ORAL DAILY
Status: DISCONTINUED | OUTPATIENT
Start: 2020-07-15 | End: 2020-07-16 | Stop reason: HOSPADM

## 2020-07-15 RX ORDER — HEPARIN SODIUM 5000 [USP'U]/ML
4000 INJECTION, SOLUTION INTRAVENOUS; SUBCUTANEOUS
Status: COMPLETED | OUTPATIENT
Start: 2020-07-15 | End: 2020-07-15

## 2020-07-15 RX ORDER — SODIUM CHLORIDE 9 MG/ML
100 INJECTION, SOLUTION INTRAVENOUS CONTINUOUS
Status: DISPENSED | OUTPATIENT
Start: 2020-07-16 | End: 2020-07-16

## 2020-07-15 RX ORDER — MELOXICAM 7.5 MG/1
TABLET ORAL DAILY
COMMUNITY
End: 2020-07-16

## 2020-07-15 RX ORDER — SODIUM CHLORIDE 0.9 % (FLUSH) 0.9 %
5-40 SYRINGE (ML) INJECTION EVERY 8 HOURS
Status: DISCONTINUED | OUTPATIENT
Start: 2020-07-15 | End: 2020-07-16 | Stop reason: HOSPADM

## 2020-07-15 RX ORDER — LIDOCAINE HYDROCHLORIDE 10 MG/ML
INJECTION INFILTRATION; PERINEURAL AS NEEDED
Status: DISCONTINUED | OUTPATIENT
Start: 2020-07-15 | End: 2020-07-15 | Stop reason: HOSPADM

## 2020-07-15 RX ORDER — NITROGLYCERIN 0.4 MG/1
0.4 TABLET SUBLINGUAL
Status: DISCONTINUED | OUTPATIENT
Start: 2020-07-15 | End: 2020-07-16 | Stop reason: HOSPADM

## 2020-07-15 RX ORDER — MIDAZOLAM HYDROCHLORIDE 1 MG/ML
INJECTION, SOLUTION INTRAMUSCULAR; INTRAVENOUS AS NEEDED
Status: DISCONTINUED | OUTPATIENT
Start: 2020-07-15 | End: 2020-07-15 | Stop reason: HOSPADM

## 2020-07-15 RX ORDER — SODIUM CHLORIDE 9 MG/ML
1000 INJECTION, SOLUTION INTRAVENOUS CONTINUOUS
Status: DISPENSED | OUTPATIENT
Start: 2020-07-15 | End: 2020-07-15

## 2020-07-15 RX ORDER — FENTANYL CITRATE 50 UG/ML
INJECTION, SOLUTION INTRAMUSCULAR; INTRAVENOUS AS NEEDED
Status: DISCONTINUED | OUTPATIENT
Start: 2020-07-15 | End: 2020-07-15 | Stop reason: HOSPADM

## 2020-07-15 RX ORDER — ZOLPIDEM TARTRATE 5 MG/1
5 TABLET ORAL
Status: DISCONTINUED | OUTPATIENT
Start: 2020-07-15 | End: 2020-07-16 | Stop reason: HOSPADM

## 2020-07-15 RX ORDER — METOPROLOL TARTRATE 25 MG/1
12.5 TABLET, FILM COATED ORAL EVERY 12 HOURS
Status: DISCONTINUED | OUTPATIENT
Start: 2020-07-15 | End: 2020-07-16 | Stop reason: HOSPADM

## 2020-07-15 RX ADMIN — ASPIRIN 81 MG CHEWABLE TABLET 81 MG: 81 TABLET CHEWABLE at 09:04

## 2020-07-15 RX ADMIN — NITROGLYCERIN 0.4 MG: 0.4 TABLET, ORALLY DISINTEGRATING SUBLINGUAL at 03:17

## 2020-07-15 RX ADMIN — ASPIRIN 81 MG CHEWABLE TABLET 324 MG: 81 TABLET CHEWABLE at 03:02

## 2020-07-15 RX ADMIN — LIDOCAINE HYDROCHLORIDE 40 ML: 20 SOLUTION ORAL; TOPICAL at 02:56

## 2020-07-15 RX ADMIN — NITROGLYCERIN 0.4 MG: 0.4 TABLET, ORALLY DISINTEGRATING SUBLINGUAL at 03:09

## 2020-07-15 RX ADMIN — METOPROLOL TARTRATE 12.5 MG: 25 TABLET, FILM COATED ORAL at 09:05

## 2020-07-15 RX ADMIN — HEPARIN SODIUM 4000 UNITS: 5000 INJECTION INTRAVENOUS; SUBCUTANEOUS at 03:24

## 2020-07-15 RX ADMIN — Medication 1 AMPULE: at 20:22

## 2020-07-15 RX ADMIN — TICAGRELOR 180 MG: 90 TABLET ORAL at 03:25

## 2020-07-15 RX ADMIN — ROSUVASTATIN 20 MG: 20 TABLET, FILM COATED ORAL at 05:43

## 2020-07-15 RX ADMIN — SODIUM CHLORIDE 1000 ML: 900 INJECTION, SOLUTION INTRAVENOUS at 05:43

## 2020-07-15 RX ADMIN — TICAGRELOR 90 MG: 90 TABLET ORAL at 20:21

## 2020-07-15 RX ADMIN — Medication 1 AMPULE: at 09:04

## 2020-07-15 RX ADMIN — Medication 10 ML: at 23:24

## 2020-07-15 RX ADMIN — Medication 10 ML: at 05:45

## 2020-07-15 RX ADMIN — TICAGRELOR 90 MG: 90 TABLET ORAL at 10:02

## 2020-07-15 NOTE — Clinical Note
Lesion: Located in the Distal RCA. Stent inserted. Single technique used. First inflation pressure = 16 parminder; inflation time: 16 sec.

## 2020-07-15 NOTE — ED NOTES
Spoke with cath lab to clarify that they are needed for a STEMI.  Made aware of repeat EKG read by Dr. Ana Hampton and it will be a cath lab case

## 2020-07-15 NOTE — Clinical Note
Lesion located in the Distal RCA. Balloon inserted. Balloon inflated using multiple inflations inflation technique. Lesion #1: Pressure = 20 parminder; Duration = 20 sec. Inflation 2: Pressure = 20 parminder; Duration = 12 sec.

## 2020-07-15 NOTE — PROGRESS NOTES
TRANSFER - IN REPORT:    Verbal report received from Cath Lab, RN(name) on Castillo Arenas  being received from Cath LAB(unit) for routine progression of care      Report consisted of patients Situation, Background, Assessment and   Recommendations(SBAR). Information from the following report(s) SBAR and Procedure Summary was reviewed with the receiving nurse. Opportunity for questions and clarification was provided. Assessment completed upon patients arrival to unit and care assumed. [address] to unit with cath lab RNs. Patient was alert and oreinted. SR on the monitor. Room air. WNL VS. Denies cp/sob. Report from RN states to turn off angiomax at 0645, and then start TR Band removal at 0845. Post cath EKG and labs completed. Patient bathed and voided in urainl. TR band intact no hematoma or bleeding. Critical Lab called for trop of 25.00. Did not call to doctor d/t this not being outside of expect normal range with recent [facility] and cath completed. Wife at bedside with patient. Wife left to get sleep at 640. angiomax stopped at 0645.

## 2020-07-15 NOTE — PROGRESS NOTES
7/15/2020    INTENSIVIST PROGRESS NOTE:     Patient seen and evaluated, chart reviewed. IVCU overflow. Patient presented with STEMI early this morning, underwent PCI to RCA. Now resting comfortably without complaint. Chest pain free. Visit Vitals  /72   Pulse 81   Temp 97.8 °F (36.6 °C)   Resp 16   Ht 5' 6\" (1.676 m)   Wt 83.7 kg (184 lb 8.4 oz)   SpO2 95%   BMI 29.78 kg/m²       General: alert, no distress  Eyes: anicteric  HEENT: NC/AT  CV: RRR  Lungs: CTA B  Abd: soft, +BS  Ext: no cyanosis, no clubbing  Skin: warm and dry  Musculoskeletal: no gross deformities  Neuro: nonfocal    CXR: no acute process  Labs reviewed    A/P:  - STEMI - s/p PCI to RCA.  Need PCI to additional vessels - continue post-PCI care  - advance diet  - transfer to IVCU when bed available  Annabel Payne MD

## 2020-07-15 NOTE — PROGRESS NOTES
0730 Report received from Gasper Rojas RN. Per Report TR band should be removed at 0845.  0800 TR band decreased by 3. Will repeat per policy. 7675 Dr Karen Harden and Yaron Avila ANP at bedside, rounding on patient. 5403 Final 3 cc removed from TR band. Band removed, dressing applied. [address] ANP contacted for order clarificaion on Brilinta. Bilinta to be dosed at every 12 hours with a now dose, despite bolus given at [address]. Orders discussed with pharmacy and corrected. 1351 Echo at bedside. 1436 Consent obtained for cath, patient given opportunity for questions and clarification. Ready bed on IVCU. 1444 . Phil Erie TRANSFER - OUT REPORT:    Verbal report given to Nevin(name) on Sterling Sickle  being transferred to IVCU(unit) for routine progression of care       Report consisted of patients Situation, Background, Assessment and   Recommendations(SBAR). Information from the following report(s) SBAR, Kardex, ED Summary, Procedure Summary, Intake/Output, MAR, Accordion, Recent Results, Med Rec Status, Cardiac Rhythm SR and Alarm Parameters  was reviewed with the receiving nurse. Lines:   Peripheral IV 07/15/20 Antecubital (Active)   Site Assessment Clean, dry, & intact 07/15/20 1100   Phlebitis Assessment 0 07/15/20 1100   Infiltration Assessment 0 07/15/20 1100   Dressing Status Clean, dry, & intact 07/15/20 1100   Dressing Type Transparent 07/15/20 1100   Hub Color/Line Status Green;Capped 07/15/20 1100   Action Taken Open ports on tubing capped 07/15/20 1100        Opportunity for questions and clarification was provided.       Patient transported with:   Monitor  Registered Nurse

## 2020-07-15 NOTE — Clinical Note
TRANSFER - OUT REPORT:     Verbal report given to: SADAF. Report consisted of patient's Situation, Background, Assessment and   Recommendations(SBAR). Opportunity for questions and clarification was provided. Patient transported with a Registered Nurse. Patient transported to: CCU.

## 2020-07-15 NOTE — H&P
[facility] [address] [facility]     Date of  Admission: 7/15/2020  2:33 AM     Admission type:Emergency    Consult for:  Consult by: Subjective:     Taye Alejandra, 52 y.o. male with PMHx significant for hypertension and GERD presents to the ED with chest pain that started about 1 AM.  Patient reports his pain is a \"burning sensation in his lungs\". It is accompanied by sweatiness. He denies any nausea, vomiting, shortness of breath, cough, fever. He reports his pain is 6 out of 10 in intensity. He is a non-smoker and does not have a family history of heart disease. Patient denies personal history of hyperlipidemia. Patient reports that he exercises regularly and 2 days ago while he was jogging he had a similar sensation in his chest after he had been running for about a mile and a half. PCP: Benjamin Dalal MD    Continues to have pain at present although it has eased somewhat. .      Patient Active Problem List    Diagnosis Date Noted    Acute ST elevation myocardial infarction (STEMI) of inferior wall ([address] 07/15/2020    Spondylolisthesis of lumbar region 11/28/2018    S/P lumbar fusion 11/28/2018    Essential hypertension 09/08/2017    GERD (gastroesophageal reflux disease)     Hypercholesteremia       Benjamin Dalal MD  Past Medical History:   Diagnosis Date    Bursitis of shoulder     right; \"comes and goes\"    Essential hypertension 9/8/2017    GERD (gastroesophageal reflux disease)     Hypercholesteremia     Osteoarthritis       Social History     Socioeconomic History    Marital status:      Spouse name: Not on file    Number of children: Not on file    Years of education: Not on file    Highest education level: Not on file   Tobacco Use    Smoking status: Never Smoker    Smokeless tobacco: Never Used   Substance and Sexual Activity    Alcohol use: No    Drug use: No    Sexual activity: Yes     Partners: Female   Social History Narrative    Works for [employer].      No Known Allergies   Family History   Problem Relation Age of Onset    Other Father         patient unsure, some sort of issues with RBCs of WBCs      Current Facility-Administered Medications   Medication Dose Route Frequency    nitroglycerin (NITROSTAT) tablet 0.4 mg  0.4 mg SubLINGual Q5MIN PRN        Review of Symptoms:   11 systems reviewed, negative other than as stated in the HPI        Objective:      Visit Vitals  /86   Pulse 71   Temp 97.7 °F (36.5 °C)   Resp 14   Ht 5' 6\" (1.676 m)   Wt 184 lb 4.9 oz (83.6 kg)   SpO2 98%   BMI 29.75 kg/m²       Physical:   General:   Heart: RRR, no m/S3/JVD, no carotid bruits   Lungs: clear   Abdomen: Soft, +BS, NTND   Extremities: LE bessy +DP/PT, no edema   Neurologic: Grossly normal    Data Review:   Recent Labs     07/15/20  0246   WBC 8.3   HGB 14.5   HCT 42.2        Recent Labs     07/15/20  0246      K 3.5      CO2 30   *   BUN 19   CREA 1.18   CA 8.4*   ALB 3.6   TBILI 0.9   ALT 29       Recent Labs     07/15/20  0246   TROIQ <0.05       No intake or output data in the 24 hours ending 07/15/20 0400     Cardiographics    Telemetry:   ECG:   Echocardiogram:   CXRAY:       Assessment:       Active Problems:    Acute ST elevation myocardial infarction (STEMI) of inferior wall (HCC) (7/15/2020)         Plan:     Emergent Cath/PCI    Roge Martinez MD

## 2020-07-15 NOTE — Clinical Note
TRANSFER - OUT REPORT:     Verbal report given to: Alba. Report consisted of patient's Situation, Background, Assessment and   Recommendations(SBAR). Opportunity for questions and clarification was provided. Patient transported with a Registered Nurse. Patient transported to: IVCU.

## 2020-07-15 NOTE — ED NOTES
Pt states chest pain is now a 4 on the left and a 7 on the right. MD notified. Second Nitroglycerin given. Second EKG in process. 7106 - Pt states pain is now completely gone on the left side and a 6 on the right side.

## 2020-07-15 NOTE — PROGRESS NOTES
Care Management:    LORENA: Home with wife and follow up with physicians. Wife will transport home. Reason for Admission:   MI and cardiac cath today and a second one tomorrow. Patient lives at home with his wife. NO DME. Uses [facility] on [address]. Active with PCP. He works and he drives. RUR Score:     5                Plan for utilizing home health:   Not anticipated       PCP: First and Last name:  Sesar Malik   Name of Practice:    Are you a current patient: Yes   Approximate date of last visit: Last Fall. Can you participate in a virtual visit with your PCP: yes                    Current Advanced Directive/Advance Care Plan: Full Code and wife is NOK. Care Management Interventions  PCP Verified by CM: Yes  Mode of Transport at Discharge: Other (see comment)(wife)  Current Support Network: Lives with Spouse(Lives with wife in two story home. Independent with ADL's.  He drives and he works from home. )  Confirm Follow Up Transport: Family(wife or self)  Discharge Location  Discharge Placement: Home with family assistance(Anticipate home with wife and follow up with doctors. )     Anirudh Rodarte RN [facility] 3955 ADD ADDITIONAL PRIOR PREGNANCY INFORMATION...

## 2020-07-15 NOTE — Clinical Note
Lesion located in the Distal RCA. Balloon inserted. Balloon inflated using multiple inflations inflation technique. Lesion #1: Pressure = 16 parminder; Duration = 10 sec. Inflation 2: Pressure = 16 parminder; Duration = 10 sec. Inflation 3: Pressure = 16 parminder; Duration = 16 sec.

## 2020-07-15 NOTE — ED PROVIDER NOTES
EMERGENCY DEPARTMENT HISTORY AND PHYSICAL EXAM      Date: 7/15/2020  Patient Name: Terrial Burkitt    History of Presenting Illness     Chief Complaint   Patient presents with    Chest Pain     Patient reports sudden onset of a burning sensation in his lungs. Reports he thought is was acid reflux took tums no relief. History Provided By: Patient    HPI: Terrial Burkitt, 52 y.o. male with PMHx significant for hypertension and GERD presents to the ED with chest pain that started about 1 AM.  Patient reports his pain is a \"burning sensation in his lungs\". It is accompanied by sweatiness. He denies any nausea, vomiting, shortness of breath, cough, fever. He reports his pain is 6 out of 10 in intensity. He is a non-smoker and does not have a family history of heart disease. Patient denies personal history of hyperlipidemia. Patient reports that he exercises regularly and 2 days ago while he was jogging he had a similar sensation in his chest after he had been running for about a mile and a half. PCP: Anthony Adames MD    No current facility-administered medications on file prior to encounter. Current Outpatient Medications on File Prior to Encounter   Medication Sig Dispense Refill    losartan (COZAAR) 50 mg tablet TAKE 1 TABLET DAILY 90 Tab 3    tretinoin (RETIN-A) 0.01 % topical gel Apply  to affected area nightly.  MULTIVITAMINS (MULTIPLE VITAMINS PO) Take 1 Tab by mouth daily. One daily          Past History     Past Medical History:  Past Medical History:   Diagnosis Date    Bursitis of shoulder     right; \"comes and goes\"    Essential hypertension 9/8/2017    GERD (gastroesophageal reflux disease)     Hypercholesteremia     Osteoarthritis        Past Surgical History:  Past Surgical History:   Procedure Laterality Date    HX ACL RECONSTRUCTION Left     HX HERNIA REPAIR      HX LUMBAR LAMINECTOMY  2018    Dr. Reyes Po; L4-5 level.      HX OTHER SURGICAL      INGROWN TOE NAIL REMOVED OFF L big toe     HX OTHER SURGICAL  03/2018    orthoscopy--R hip    HX REFRACTIVE SURGERY      HX SHOULDER ARTHROSCOPY  3/01/2012    Right shoulder     HX SHOULDER ARTHROSCOPY  04/2014    R shoulder     HX VASECTOMY         Family History:  Family History   Problem Relation Age of Onset    Other Father         patient unsure, some sort of issues with RBCs of WBCs       Social History:  Social History     Tobacco Use    Smoking status: Never Smoker    Smokeless tobacco: Never Used   Substance Use Topics    Alcohol use: No    Drug use: No       Allergies:  No Known Allergies      Review of Systems   Review of Systems   Constitutional: Positive for diaphoresis. Negative for chills and fever. HENT: Negative for congestion, rhinorrhea and sore throat. Eyes: Negative. Respiratory: Positive for chest tightness. Negative for cough and shortness of breath. Cardiovascular: Positive for chest pain. Negative for palpitations. Gastrointestinal: Negative for abdominal pain, diarrhea, nausea and vomiting. Genitourinary: Negative for dysuria, flank pain and hematuria. Musculoskeletal: Negative for back pain and myalgias. Skin: Negative for rash and wound. Neurological: Negative for dizziness, syncope, light-headedness and headaches. Psychiatric/Behavioral: Negative for confusion. The patient is not nervous/anxious. All other systems reviewed and are negative.         Physical Exam   General appearance - well nourished, well appearing, and in no distress  Eyes - pupils equal and reactive, extraocular eye movements intact  ENT - mucous membranes moist, pharynx normal without lesions  Neck - supple, no significant adenopathy; non-tender to palpation  Chest - clear to auscultation, no wheezes, rales or rhonchi; non-tender to palpation  Heart - normal rate and regular rhythm, S1 and S2 normal, no murmurs noted  Abdomen - soft, nontender, nondistended, no masses or organomegaly  Musculoskeletal - no joint tenderness, deformity or swelling; normal ROM  Extremities - peripheral pulses normal, no pedal edema  Skin -pale, diaphoretic normal coloration and turgor, no rashes  Neurological - alert, oriented x3, normal speech, no focal findings or movement disorder noted    Diagnostic Study Results     Labs -     Recent Results (from the past 12 hour(s))   EKG, 12 LEAD, INITIAL    Collection Time: 07/15/20  2:39 AM   Result Value Ref Range    Ventricular Rate 55 BPM    Atrial Rate 55 BPM    P-R Interval 154 ms    QRS Duration 106 ms    Q-T Interval 408 ms    QTC Calculation (Bezet) 390 ms    Calculated P Axis 26 degrees    Calculated R Axis 12 degrees    Calculated T Axis 50 degrees    Diagnosis       Sinus bradycardia  Minimal voltage criteria for LVH, may be normal variant  Nonspecific ST abnormality  When compared with ECG of 20-NOV-2018 14:44,  QRS duration has increased  ST elevation now present in Inferior leads  T wave inversion no longer evident in Inferior leads     CBC WITH AUTOMATED DIFF    Collection Time: 07/15/20  2:46 AM   Result Value Ref Range    WBC 8.3 4.1 - 11.1 K/uL    RBC 4.76 4.10 - 5.70 M/uL    HGB 14.5 12.1 - 17.0 g/dL    HCT 42.2 36.6 - 50.3 %    MCV 88.7 80.0 - 99.0 FL    MCH 30.5 26.0 - 34.0 PG    MCHC 34.4 30.0 - 36.5 g/dL    RDW 12.2 11.5 - 14.5 %    PLATELET 566 553 - 614 K/uL    MPV 10.1 8.9 - 12.9 FL    NRBC 0.0 0  WBC    ABSOLUTE NRBC 0.00 0.00 - 0.01 K/uL    NEUTROPHILS 51 32 - 75 %    LYMPHOCYTES 36 12 - 49 %    MONOCYTES 7 5 - 13 %    EOSINOPHILS 4 0 - 7 %    BASOPHILS 1 0 - 1 %    IMMATURE GRANULOCYTES 1 (H) 0.0 - 0.5 %    ABS. NEUTROPHILS 4.3 1.8 - 8.0 K/UL    ABS. LYMPHOCYTES 3.0 0.8 - 3.5 K/UL    ABS. MONOCYTES 0.6 0.0 - 1.0 K/UL    ABS. EOSINOPHILS 0.3 0.0 - 0.4 K/UL    ABS. BASOPHILS 0.1 0.0 - 0.1 K/UL    ABS. IMM.  GRANS. 0.0 0.00 - 0.04 K/UL    DF AUTOMATED     POC TROPONIN-I    Collection Time: 07/15/20  2:52 AM   Result Value Ref Range    Troponin-I (POC) <0.04 0.00 - 0.08 ng/mL       Radiologic Studies -   XR CHEST PORT   Final Result   IMPRESSION:   No acute process. CT Results  (Last 48 hours)    None        CXR Results  (Last 48 hours)               07/15/20 0318  XR CHEST PORT Final result    Impression:  IMPRESSION:   No acute process. Narrative:  INDICATION: CP       EXAM:  AP CHEST RADIOGRAPH       COMPARISON: April 9, 2010       FINDINGS:       AP portable view of the chest demonstrates a normal cardiomediastinal   silhouette. There is no edema, effusion, consolidation, or pneumothorax. The   osseous structures are unremarkable. Medical Decision Making   I am the first provider for this patient. I reviewed the vital signs, available nursing notes, past medical history, past surgical history, family history and social history. Vital Signs-Reviewed the patient's vital signs. Patient Vitals for the past 12 hrs:   Temp Pulse Resp BP SpO2   07/15/20 0322  63 18 119/82 97 %   07/15/20 0317  64  125/88    07/15/20 0259  (!) 59 16 (!) 132/94 97 %   07/15/20 0245  (!) 58 21 (!) 134/95 96 %   07/15/20 0232 97.7 °F (36.5 °C) (!) 57 18 (!) 130/92 97 %       EKG at 2:39 AM on July 15, 2020 interpreted by me: Sinus bradycardia, 55 bpm normal axis, normal GA, QRS, QTc intervals, inferior ST elevation present consistent with inferior STEMI    EKG at 3:17 AM on July 15, 2020 interpreted by me: Sinus rhythm with frequent PVCs, 64 bpm, normal GA, QRS, QTc intervals, normal axis, worsening ST elevation in inferior leads with ST depression in lead aVL    Records Reviewed: Nursing Notes and Old Medical Records    Provider Notes (Medical Decision Making):   Patient with acute onset of chest pain at 1 AM with history of hypertension and EKG consistent with STEMI. Will check CBC, CMP, CPK, troponin, point-of-care troponin. Will administer aspirin and call emergent cardiology consult for STEMI    ED Course:   Initial assessment performed.  The patients presenting problems have been discussed, and they are in agreement with the care plan formulated and outlined with them. I have encouraged them to ask questions as they arise throughout their visit. Progress Notes:  ED Course as of Jul 15 0347   Wed Jul 15, 2020   0334  On my initial read of first EKG, Cath Lab and Dr. Edwadr Junior were paged for STEMI alert. (2:50 AM) EKG and previous EKG for comparison were sent for review via perfect serve text to Dr. Edward Junior at the same time as STEMI call was made through answering service. Dr. Edward Junior responded through perfect serve text stating that, after review of EKGs, he did not think initial EKG met criteria for STEMI. Continued to monitor the patient. His pain was unrelieved by GI cocktail. Point-of-care troponin was negative. Pain was partially relieved with sublingual nitroglycerin. I was still concerned for ischemia given patient's ongoing pain and EKG findings. While continuing to attempt to contact  via perfect serve text and through  answering service to discuss my concerns, a repeat EKG was obtained which showed worsening ST elevation in inferior leads. Was able to call Dr. Edward Junior directly through [employer] system at approximately 3:20 AM and, at that time, he agreed to come in and take patient to the Cath Lab for intervention. He recommended Brilinta in addition to the heparin and aspirin that I had already ordered.   Cath Lab team had responded to original STEMI call and was already present in house.   [AO]      ED Course User Index  [AO] Bertha Cool MD       Disposition:  Admit to cardiology    CRITICAL CARE NOTE :      IMPENDING DETERIORATION -Cardiovascular    ASSOCIATED RISK FACTORS - Dysrhythmia    MANAGEMENT- Bedside Assessment and Supervision of Care    INTERPRETATION -  ECG and Blood Pressure    INTERVENTIONS - hemodynamic mngmt, Metobolic interventions and emergent Cath Lab/PCI    CASE REVIEW - Medical Sub-Specialist, Nursing and Family    TREATMENT RESPONSE -Stable    PERFORMED BY - Self        NOTES   :      I have spent 45 minutes of critical care time involved in lab review, consultations with specialist, family decision- making, bedside attention and documentation. During this entire length of time I was immediately available to the patient . Alba Knight MD        Diagnosis     Clinical Impression:   1.  ST elevation myocardial infarction (STEMI), unspecified artery ([facility] [address]

## 2020-07-15 NOTE — Clinical Note
Lesion located in the Distal RCA. Balloon inserted. Balloon inflated using multiple inflations inflation technique. Lesion #1: Pressure = 4 parminder; Duration = 10 sec.

## 2020-07-15 NOTE — Clinical Note
Lesion: Located in the Proximal LAD. Pressure wire inserted. FFR value = 0.87. Mrs. Crane called to let us know they will be out of the home until about 3:30 or 4:00.  She said she had called yesterday about a refill and hadn't heard back.

## 2020-07-15 NOTE — PROGRESS NOTES
[address] [place] [address]  [phone]      Cardiology Progress Note      7/15/2020 10:24 AM    Admit Date: 7/15/2020    Admit Diagnosis:   STEMI (ST elevation myocardial infarction) [facility]) [I21.3]  STEMI involving right coronary artery ([address] [I21.11]    Subjective:     Reina Sánchez is s/p STEMI with urgent cath to PCI/JAY to RCA. He has no c/o CP, SOB.  VSS. Visit Vitals  /72   Pulse 90   Temp 97.8 °F (36.6 °C)   Resp 16   Ht 5' 6\" (1.676 m)   Wt 83.7 kg (184 lb 8.4 oz)   SpO2 95%   BMI 29.78 kg/m²       Current Facility-Administered Medications   Medication Dose Route Frequency    nitroglycerin (NITROSTAT) tablet 0.4 mg  0.4 mg SubLINGual Q5MIN PRN    . PHARMACY TO SUBSTITUTE PER PROTOCOL (Reordered from: tretinoin (RETIN-A) 0.01 % topical gel)    Per Protocol    0.9% sodium chloride infusion 1,000 mL  1,000 mL IntraVENous CONTINUOUS    sodium chloride (NS) flush 5-40 mL  5-40 mL IntraVENous Q8H    sodium chloride (NS) flush 5-40 mL  5-40 mL IntraVENous PRN    aspirin chewable tablet 81 mg  81 mg Oral DAILY    zolpidem (AMBIEN) tablet 5 mg  5 mg Oral QHS PRN    rosuvastatin (CRESTOR) tablet 20 mg  20 mg Oral DAILY    alcohol 62% (NOZIN) nasal  1 Ampule  1 Ampule Topical Q12H    metoprolol tartrate (LOPRESSOR) tablet 12.5 mg  12.5 mg Oral Q12H    ticagrelor (BRILINTA) tablet 90 mg  90 mg Oral Q12H       Objective:      Physical Exam:  General Appearance:  WNWD young  male in no acute distress  Chest:   Clear  Cardiovascular:  Regular rate and rhythm, no murmur. Abdomen:   Soft, non-tender, bowel sounds are active. Extremities: right radial site D/I, no hematoma  Skin:  Warm and dry.      Data Review:   Recent Labs     07/15/20  0532 07/15/20  0246   WBC 11.1 8.3   HGB 14.1 14.5   HCT 41.1 42.2    259     Recent Labs     07/15/20  0532 07/15/20  0246    140   K 3.6 3.5    106   CO2 28 30   * 117*   BUN 18 19   CREA 1.15 1.18 CA 8.6 8.4*   ALB  --  3.6   TBILI  --  0.9   ALT  --  29       Recent Labs     07/15/20  0532 07/15/20  0246   TROIQ 25.00* <0.05     Results for Verline Bumpers (MRN [id number]) as of 7/15/2020 10:31   Ref. Range 7/15/2020 05:32   Triglyceride Latest Ref Range: <150 MG/   Cholesterol, total Latest Ref Range: <200 MG/   HDL Cholesterol Latest Units: MG/DL 46   CHOL/HDL Ratio Latest Ref Range: 0.0 - 5.0   3.9   VLDL, calculated Latest Units: MG/DL 27.2   LDL, calculated Latest Ref Range: 0 - 100 MG/.8 (H)       Intake/Output Summary (Last 24 hours) at 7/15/2020 1024  Last data filed at 7/15/2020 0800  Gross per 24 hour   Intake 384.06 ml   Output 350 ml   Net 34.06 ml        Telemetry: SR  EKG: SR with LAYNE resolved      Assessment:     Active Problems:    STEMI (ST elevation myocardial infarction) ([facility] [address] (7/15/2020)      Acute ST elevation myocardial infarction (STEMI) of inferior wall ([facility] [address] (7/15/2020)      STEMI involving right coronary artery ([facility] [address] (7/15/2020)        Plan:     STEMI:  Urgent cath with PCI/JAY distal RCA. Mid RCA and prox LAD 50% lesions notes. Plan for cath on Thursday to FFR to determine if significant. Started on ASA 81mg daily for life, Brilinta 90mg BID x 1 year, Crestor, and low dose BB as BP tolerates.      Dyslipidemia:  Starting statin        Maria Del Carmen Carbone ACNP  Cardiology

## 2020-07-15 NOTE — PROGRESS NOTES
15:04 - TRANSFER - IN REPORT:    Verbal report received from Tawanda Hay RN(name) on Lyndal Sever  being received from CCU(unit) for routine progression of care      Report consisted of patients Situation, Background, Assessment and   Recommendations(SBAR). Information from the following report(s) SBAR, Kardex, Procedure Summary, Intake/Output, MAR, Recent Results and Cardiac Rhythm NSR was reviewed with the receiving nurse. Opportunity for questions and clarification was provided. Assessment completed upon patients arrival to unit and care assumed. 18:30 - Bradycardic in 40-50s. Patient asymptomatic, sleeping in bed. 19:00 - Patient awoke and rang out to state that they did not get a dinner tray. Declined offer of [employer] meal and snacks offered. Nursing supervisor notified. 19:15 - Bedside report given to Leidy Chow RN.

## 2020-07-16 VITALS
RESPIRATION RATE: 16 BRPM | DIASTOLIC BLOOD PRESSURE: 84 MMHG | SYSTOLIC BLOOD PRESSURE: 110 MMHG | WEIGHT: 192.24 LBS | HEIGHT: 72 IN | OXYGEN SATURATION: 96 % | BODY MASS INDEX: 26.04 KG/M2 | HEART RATE: 71 BPM | TEMPERATURE: 97.3 F

## 2020-07-16 LAB
ATRIAL RATE: 55 BPM
ATRIAL RATE: 60 BPM
ATRIAL RATE: 64 BPM
ATRIAL RATE: 80 BPM
CALCULATED P AXIS, ECG09: 24 DEGREES
CALCULATED P AXIS, ECG09: 26 DEGREES
CALCULATED P AXIS, ECG09: 45 DEGREES
CALCULATED P AXIS, ECG09: 58 DEGREES
CALCULATED R AXIS, ECG10: -23 DEGREES
CALCULATED R AXIS, ECG10: -28 DEGREES
CALCULATED R AXIS, ECG10: 10 DEGREES
CALCULATED R AXIS, ECG10: 12 DEGREES
CALCULATED T AXIS, ECG11: -34 DEGREES
CALCULATED T AXIS, ECG11: 50 DEGREES
CALCULATED T AXIS, ECG11: 6 DEGREES
CALCULATED T AXIS, ECG11: 71 DEGREES
CHOLEST SERPL-MCNC: 172 MG/DL
DIAGNOSIS, 93000: NORMAL
ECHO AO ROOT DIAM: 3.23 CM
ECHO AV AREA PEAK VELOCITY: 2.68 CM2
ECHO AV AREA/BSA PEAK VELOCITY: 1.3 CM2/M2
ECHO AV CUSP MM: 2.1 CM
ECHO AV PEAK GRADIENT: 4.43 MMHG
ECHO AV PEAK VELOCITY: 105.23 CM/S
ECHO LA AREA 4C: 19.8 CM2
ECHO LA MAJOR AXIS: 4.02 CM
ECHO LA MINOR AXIS: 1.91 CM
ECHO LA TO AORTIC ROOT RATIO: 1.39
ECHO LA TO AORTIC ROOT RATIO: 1.39
ECHO LA VOL 2C: 86.45 ML (ref 18–58)
ECHO LA VOL 4C: 59.13 ML (ref 18–58)
ECHO LA VOL BP: 78.92 ML (ref 18–58)
ECHO LA VOL/BSA BIPLANE: 37.44 ML/M2 (ref 16–28)
ECHO LA VOLUME INDEX A2C: 41.01 ML/M2 (ref 16–28)
ECHO LA VOLUME INDEX A4C: 28.05 ML/M2 (ref 16–28)
ECHO LV E' LATERAL VELOCITY: 7.41 CM/S
ECHO LV E' SEPTAL VELOCITY: 7.23 CM/S
ECHO LV INTERNAL DIMENSION DIASTOLIC: 5 CM (ref 4.2–5.9)
ECHO LV INTERNAL DIMENSION SYSTOLIC: 3.38 CM
ECHO LV IVSD: 1 CM (ref 0.6–1)
ECHO LV IVSS: 1.95 CM
ECHO LV MASS 2D: 194.4 G (ref 88–224)
ECHO LV MASS INDEX 2D: 92.2 G/M2 (ref 49–115)
ECHO LV POSTERIOR WALL DIASTOLIC: 1.1 CM (ref 0.6–1)
ECHO LV POSTERIOR WALL SYSTOLIC: 2.06 CM
ECHO LVOT DIAM: 2.14 CM
ECHO LVOT PEAK GRADIENT: 2.45 MMHG
ECHO LVOT PEAK VELOCITY: 78.26 CM/S
ECHO MV A VELOCITY: 73.96 CM/S
ECHO MV E DECELERATION TIME (DT): 0.18 S
ECHO MV E VELOCITY: 64.97 CM/S
ECHO MV E/A RATIO: 0.88
ECHO MV E/E' LATERAL: 8.77
ECHO MV E/E' RATIO (AVERAGED): 8.88
ECHO MV E/E' SEPTAL: 8.99
ECHO PV MAX VELOCITY: 77.32 CM/S
ECHO PV PEAK INSTANTANEOUS GRADIENT SYSTOLIC: 2.41 MMHG
ECHO PV REGURGITANT MAX VELOCITY: 363.77 CM/S
ECHO RV INTERNAL DIMENSION: 3.67 CM
HDLC SERPL-MCNC: 45 MG/DL
HDLC SERPL: 3.8 {RATIO} (ref 0–5)
LDLC SERPL CALC-MCNC: 78.6 MG/DL (ref 0–100)
LIPID PROFILE,FLP: ABNORMAL
P-R INTERVAL, ECG05: 154 MS
P-R INTERVAL, ECG05: 160 MS
P-R INTERVAL, ECG05: 170 MS
P-R INTERVAL, ECG05: 182 MS
Q-T INTERVAL, ECG07: 388 MS
Q-T INTERVAL, ECG07: 400 MS
Q-T INTERVAL, ECG07: 408 MS
Q-T INTERVAL, ECG07: 446 MS
QRS DURATION, ECG06: 106 MS
QRS DURATION, ECG06: 108 MS
QRS DURATION, ECG06: 88 MS
QRS DURATION, ECG06: 94 MS
QTC CALCULATION (BEZET), ECG08: 390 MS
QTC CALCULATION (BEZET), ECG08: 412 MS
QTC CALCULATION (BEZET), ECG08: 446 MS
QTC CALCULATION (BEZET), ECG08: 447 MS
TRIGL SERPL-MCNC: 242 MG/DL (ref ?–150)
VENTRICULAR RATE, ECG03: 55 BPM
VENTRICULAR RATE, ECG03: 60 BPM
VENTRICULAR RATE, ECG03: 64 BPM
VENTRICULAR RATE, ECG03: 80 BPM
VLDLC SERPL CALC-MCNC: 48.4 MG/DL

## 2020-07-16 PROCEDURE — 76937 US GUIDE VASCULAR ACCESS: CPT | Performed by: INTERNAL MEDICINE

## 2020-07-16 PROCEDURE — 93458 L HRT ARTERY/VENTRICLE ANGIO: CPT | Performed by: INTERNAL MEDICINE

## 2020-07-16 PROCEDURE — C1769 GUIDE WIRE: HCPCS | Performed by: INTERNAL MEDICINE

## 2020-07-16 PROCEDURE — 80061 LIPID PANEL: CPT

## 2020-07-16 PROCEDURE — 3E033PZ INTRODUCTION OF PLATELET INHIBITOR INTO PERIPHERAL VEIN, PERCUTANEOUS APPROACH: ICD-10-PCS | Performed by: INTERNAL MEDICINE

## 2020-07-16 PROCEDURE — 74011636320 HC RX REV CODE- 636/320: Performed by: INTERNAL MEDICINE

## 2020-07-16 PROCEDURE — B2111ZZ FLUOROSCOPY OF MULTIPLE CORONARY ARTERIES USING LOW OSMOLAR CONTRAST: ICD-10-PCS | Performed by: INTERNAL MEDICINE

## 2020-07-16 PROCEDURE — 93571 IV DOP VEL&/PRESS C FLO 1ST: CPT | Performed by: INTERNAL MEDICINE

## 2020-07-16 PROCEDURE — 74011000250 HC RX REV CODE- 250: Performed by: INTERNAL MEDICINE

## 2020-07-16 PROCEDURE — 77030015766: Performed by: INTERNAL MEDICINE

## 2020-07-16 PROCEDURE — 77030019569 HC BND COMPR RAD TERU -B: Performed by: INTERNAL MEDICINE

## 2020-07-16 PROCEDURE — 93005 ELECTROCARDIOGRAM TRACING: CPT

## 2020-07-16 PROCEDURE — 77030018729 HC ELECTRD DEFIB PAD CARD -B: Performed by: INTERNAL MEDICINE

## 2020-07-16 PROCEDURE — 4A023N7 MEASUREMENT OF CARDIAC SAMPLING AND PRESSURE, LEFT HEART, PERCUTANEOUS APPROACH: ICD-10-PCS | Performed by: INTERNAL MEDICINE

## 2020-07-16 PROCEDURE — 77030010221 HC SPLNT WR POS TELE -B: Performed by: INTERNAL MEDICINE

## 2020-07-16 PROCEDURE — 36415 COLL VENOUS BLD VENIPUNCTURE: CPT

## 2020-07-16 PROCEDURE — 74011000258 HC RX REV CODE- 258: Performed by: INTERNAL MEDICINE

## 2020-07-16 PROCEDURE — C1894 INTRO/SHEATH, NON-LASER: HCPCS | Performed by: INTERNAL MEDICINE

## 2020-07-16 PROCEDURE — 027034Z DILATION OF CORONARY ARTERY, ONE ARTERY WITH DRUG-ELUTING INTRALUMINAL DEVICE, PERCUTANEOUS APPROACH: ICD-10-PCS | Performed by: INTERNAL MEDICINE

## 2020-07-16 PROCEDURE — 99153 MOD SED SAME PHYS/QHP EA: CPT | Performed by: INTERNAL MEDICINE

## 2020-07-16 PROCEDURE — 4A033BC MEASUREMENT OF ARTERIAL PRESSURE, CORONARY, PERCUTANEOUS APPROACH: ICD-10-PCS | Performed by: INTERNAL MEDICINE

## 2020-07-16 PROCEDURE — 77030019698 HC SYR ANGI MDLON MRTM -A: Performed by: INTERNAL MEDICINE

## 2020-07-16 PROCEDURE — 74011250636 HC RX REV CODE- 250/636: Performed by: NURSE PRACTITIONER

## 2020-07-16 PROCEDURE — 77030012468 HC VLV BLEEDBK CNTRL ABBT -B: Performed by: INTERNAL MEDICINE

## 2020-07-16 PROCEDURE — 74011250637 HC RX REV CODE- 250/637: Performed by: INTERNAL MEDICINE

## 2020-07-16 PROCEDURE — C1887 CATHETER, GUIDING: HCPCS | Performed by: INTERNAL MEDICINE

## 2020-07-16 PROCEDURE — 77030008543 HC TBNG MON PRSS MRTM -A: Performed by: INTERNAL MEDICINE

## 2020-07-16 PROCEDURE — 99152 MOD SED SAME PHYS/QHP 5/>YRS: CPT | Performed by: INTERNAL MEDICINE

## 2020-07-16 PROCEDURE — 74011250636 HC RX REV CODE- 250/636: Performed by: INTERNAL MEDICINE

## 2020-07-16 RX ORDER — HEPARIN SODIUM 1000 [USP'U]/ML
INJECTION, SOLUTION INTRAVENOUS; SUBCUTANEOUS AS NEEDED
Status: DISCONTINUED | OUTPATIENT
Start: 2020-07-16 | End: 2020-07-16 | Stop reason: HOSPADM

## 2020-07-16 RX ORDER — HEPARIN SODIUM 200 [USP'U]/100ML
INJECTION, SOLUTION INTRAVENOUS
Status: COMPLETED | OUTPATIENT
Start: 2020-07-16 | End: 2020-07-16

## 2020-07-16 RX ORDER — ASPIRIN 81 MG/1
81 TABLET ORAL DAILY
Qty: 30 TAB | Refills: 11 | Status: SHIPPED | OUTPATIENT
Start: 2020-07-16

## 2020-07-16 RX ORDER — ROSUVASTATIN CALCIUM 20 MG/1
20 TABLET, COATED ORAL DAILY
Qty: 30 TAB | Refills: 11 | Status: SHIPPED | OUTPATIENT
Start: 2020-07-17 | End: 2021-04-19 | Stop reason: SDUPTHER

## 2020-07-16 RX ORDER — FENTANYL CITRATE 50 UG/ML
INJECTION, SOLUTION INTRAMUSCULAR; INTRAVENOUS AS NEEDED
Status: DISCONTINUED | OUTPATIENT
Start: 2020-07-16 | End: 2020-07-16 | Stop reason: HOSPADM

## 2020-07-16 RX ORDER — VERAPAMIL HYDROCHLORIDE 2.5 MG/ML
INJECTION, SOLUTION INTRAVENOUS AS NEEDED
Status: DISCONTINUED | OUTPATIENT
Start: 2020-07-16 | End: 2020-07-16 | Stop reason: HOSPADM

## 2020-07-16 RX ORDER — LIDOCAINE HYDROCHLORIDE 10 MG/ML
INJECTION, SOLUTION EPIDURAL; INFILTRATION; INTRACAUDAL; PERINEURAL AS NEEDED
Status: DISCONTINUED | OUTPATIENT
Start: 2020-07-16 | End: 2020-07-16 | Stop reason: HOSPADM

## 2020-07-16 RX ORDER — MIDAZOLAM HYDROCHLORIDE 1 MG/ML
INJECTION, SOLUTION INTRAMUSCULAR; INTRAVENOUS AS NEEDED
Status: DISCONTINUED | OUTPATIENT
Start: 2020-07-16 | End: 2020-07-16 | Stop reason: HOSPADM

## 2020-07-16 RX ORDER — METOPROLOL TARTRATE 25 MG/1
12.5 TABLET, FILM COATED ORAL EVERY 12 HOURS
Qty: 30 TAB | Refills: 11 | Status: SHIPPED | OUTPATIENT
Start: 2020-07-16 | End: 2021-04-19 | Stop reason: SDUPTHER

## 2020-07-16 RX ORDER — NITROGLYCERIN 0.4 MG/1
0.4 TABLET SUBLINGUAL
Qty: 1 BOTTLE | Refills: 1 | Status: SHIPPED | OUTPATIENT
Start: 2020-07-16

## 2020-07-16 RX ADMIN — TICAGRELOR 90 MG: 90 TABLET ORAL at 06:03

## 2020-07-16 RX ADMIN — ASPIRIN 81 MG CHEWABLE TABLET 81 MG: 81 TABLET CHEWABLE at 06:03

## 2020-07-16 RX ADMIN — Medication 10 ML: at 06:02

## 2020-07-16 RX ADMIN — SODIUM CHLORIDE 100 ML/HR: 900 INJECTION, SOLUTION INTRAVENOUS at 04:00

## 2020-07-16 RX ADMIN — ROSUVASTATIN 20 MG: 20 TABLET, FILM COATED ORAL at 08:51

## 2020-07-16 NOTE — PROGRESS NOTES
Problem: Falls - Risk of  Goal: *Absence of Falls  Description: Document Francisco Javier Davidson Fall Risk and appropriate interventions in the flowsheet. Outcome: Progressing Towards Goal  Note: Fall Risk Interventions:            Medication Interventions: Evaluate medications/consider consulting pharmacy, Patient to call before getting OOB, Teach patient to arise slowly                   Problem: Patient Education: Go to Patient Education Activity  Goal: Patient/Family Education  Outcome: Progressing Towards Goal     Problem: Pain  Goal: *Control of Pain  Outcome: Progressing Towards Goal  Goal: *PALLIATIVE CARE:  Alleviation of Pain  Outcome: Progressing Towards Goal     Problem: Patient Education: Go to Patient Education Activity  Goal: Patient/Family Education  Outcome: Progressing Towards Goal     Problem: Pressure Injury - Risk of  Goal: *Prevention of pressure injury  Description: Document Rivas Scale and appropriate interventions in the flowsheet.   Outcome: Progressing Towards Goal  Note: Pressure Injury Interventions:                                            Problem: Patient Education: Go to Patient Education Activity  Goal: Patient/Family Education  Outcome: Progressing Towards Goal     Problem: Patient Education: Go to Patient Education Activity  Goal: Patient/Family Education  Outcome: Progressing Towards Goal     Problem: Unstable angina/NSTEMI: Day 2  Goal: Off Pathway (Use only if patient is Off Pathway)  Outcome: Progressing Towards Goal  Goal: Activity/Safety  Outcome: Progressing Towards Goal  Goal: Consults, if ordered  Outcome: Progressing Towards Goal  Goal: Diagnostic Test/Procedures  Outcome: Progressing Towards Goal  Goal: Nutrition/Diet  Outcome: Progressing Towards Goal  Goal: Discharge Planning  Outcome: Progressing Towards Goal  Goal: Medications  Outcome: Progressing Towards Goal  Goal: Respiratory  Outcome: Progressing Towards Goal  Goal: Treatments/Interventions/Procedures  Outcome: Progressing Towards Goal  Goal: Psychosocial  Outcome: Progressing Towards Goal  Goal: *Hemodynamically stable  Outcome: Progressing Towards Goal  Goal: *Optimal pain control at patient's stated goal  Outcome: Progressing Towards Goal  Goal: *Lungs clear or at baseline  Outcome: Progressing Towards Goal     Problem: Patient Education: Go to Patient Education Activity  Goal: Patient/Family Education  Outcome: Progressing Towards Goal     Problem: Cath Lab Procedures: Pre-Procedure  Goal: Off Pathway (Use only if patient is Off Pathway)  Outcome: Progressing Towards Goal  Goal: Activity/Safety  Outcome: Progressing Towards Goal  Goal: Consults, if ordered  Outcome: Progressing Towards Goal  Goal: Diagnostic Test/Procedures  Outcome: Progressing Towards Goal  Goal: Nutrition/Diet  Outcome: Progressing Towards Goal  Goal: Discharge Planning  Outcome: Progressing Towards Goal  Goal: Medications  Outcome: Progressing Towards Goal  Goal: Respiratory  Outcome: Progressing Towards Goal  Goal: Treatments/Interventions/Procedures  Outcome: Progressing Towards Goal  Goal: Psychosocial  Outcome: Progressing Towards Goal  Goal: *Verbalize description of procedure  Outcome: Progressing Towards Goal  Goal: *Consent signed  Outcome: Progressing Towards Goal

## 2020-07-16 NOTE — PROGRESS NOTES
CM acknowledged discharge order. Chart review. Patient is on room air. Brillinta cards provided by Cardiology. [address] with wife  Follow up appointments. 1600  CM tasks complete. Nursing informed.      Carl Barba RN CM  Ext [phone]

## 2020-07-16 NOTE — PROGRESS NOTES
[address]  [phone]      Cardiology Progress Note      7/16/2020 10:24 AM    Admit Date: 7/15/2020    Admit Diagnosis:   STEMI (ST elevation myocardial infarction) [facility]) [I21.3]  STEMI involving right coronary artery ([address] [I21.11]    Subjective:     Jasson Beavers is s/p STEMI with urgent cath to PCI/JAY to RCA early AM 7/15. He has no c/o CP, SOB.  VSS. He is awaiting cardiac cath this AM to further evaluate mid RCA and LAD with FFR for significance. Visit Vitals  /81 (BP 1 Location: Left arm, BP Patient Position: Sitting)   Pulse (!) 59   Temp 98 °F (36.7 °C)   Resp 16   Ht 6' (1.829 m)   Wt 87.2 kg (192 lb 3.9 oz)   SpO2 98%   BMI 26.07 kg/m²       Current Facility-Administered Medications   Medication Dose Route Frequency    nitroglycerin (NITROSTAT) tablet 0.4 mg  0.4 mg SubLINGual Q5MIN PRN    . PHARMACY TO SUBSTITUTE PER PROTOCOL (Reordered from: tretinoin (RETIN-A) 0.01 % topical gel)    Per Protocol    sodium chloride (NS) flush 5-40 mL  5-40 mL IntraVENous Q8H    sodium chloride (NS) flush 5-40 mL  5-40 mL IntraVENous PRN    aspirin chewable tablet 81 mg  81 mg Oral DAILY    zolpidem (AMBIEN) tablet 5 mg  5 mg Oral QHS PRN    ticagrelor (BRILINTA) tablet 90 mg  90 mg Oral Q12H    rosuvastatin (CRESTOR) tablet 20 mg  20 mg Oral DAILY    alcohol 62% (NOZIN) nasal  1 Ampule  1 Ampule Topical Q12H    metoprolol tartrate (LOPRESSOR) tablet 12.5 mg  12.5 mg Oral Q12H    0.9% sodium chloride infusion  100 mL/hr IntraVENous CONTINUOUS       Objective:      Physical Exam:  General Appearance:  WNWD young  male in no acute distress  Chest:   Clear  Cardiovascular:  Regular rate and rhythm, no murmur. Abdomen:   Soft, non-tender, bowel sounds are active. Extremities: right radial site D/I, no hematoma  Skin:  Warm and dry.      Data Review:   Recent Labs     07/15/20  0532 07/15/20  0246   WBC 11.1 8.3   HGB 14.1 14.5   HCT 41.1 42.2    259     Recent Labs     07/15/20  0532 07/15/20  0246    140   K 3.6 3.5    106   CO2 28 30   * 117*   BUN 18 19   CREA 1.15 1.18   CA 8.6 8.4*   ALB  --  3.6   TBILI  --  0.9   ALT  --  29       Recent Labs     07/15/20  0532 07/15/20  0246   TROIQ 25.00* <0.05     Results for Rachelle Oh (MRN [id number]) as of 7/15/2020 10:31   Ref. Range 7/15/2020 05:32   Triglyceride Latest Ref Range: <150 MG/   Cholesterol, total Latest Ref Range: <200 MG/   HDL Cholesterol Latest Units: MG/DL 46   CHOL/HDL Ratio Latest Ref Range: 0.0 - 5.0   3.9   VLDL, calculated Latest Units: MG/DL 27.2   LDL, calculated Latest Ref Range: 0 - 100 MG/.8 (H)       Intake/Output Summary (Last 24 hours) at 7/16/2020 8680  Last data filed at 7/16/2020 0725  Gross per 24 hour   Intake 1535.84 ml   Output 2125 ml   Net -589.16 ml        Telemetry: SR  EKG: SR with LAYNE resolved      Assessment:     Principal Problem:    Acute ST elevation myocardial infarction (STEMI) of inferior wall (HCC) (7/15/2020)    Active Problems:    STEMI (ST elevation myocardial infarction) ([facility] [address] (7/15/2020)      Essential hypertension (9/8/2017)      STEMI involving right coronary artery ([facility] [address] (7/15/2020)      Dyslipidemia (7/15/2020)        Plan:     STEMI:  Urgent cath with PCI/JAY distal RCA. Mid RCA and prox LAD 50% lesions notes. Plan for cath on today to FFR to determine if significant. Continue ASA 81mg daily for life, Brilinta 90mg BID x 1 year, Crestor, and low dose BB as BP tolerates.      Dyslipidemia:  Starting statin        Karthikeyan Half ACNP  Cardiology

## 2020-07-16 NOTE — PROGRESS NOTES
Bedside shift change report given to Carol Arrieta RN (oncoming nurse) by Riki Kraus RN (offgoing nurse). Report included the following information SBAR, Kardex, Procedure Summary, Intake/Output, MAR, Accordion, Recent Results, Med Rec Status, Cardiac Rhythm NSR, Sinus Marlan Pa, Alarm Parameters , Pre Procedure Checklist, Procedure Verification, Quality Measures and Dual Neuro Assessment.

## 2020-07-16 NOTE — ROUTINE PROCESS
The following appointments have been successfully scheduled:    Date/time Tuesday, July 28, 2020 10:45 AM  Patient Pura Downs 1970 (39VS M) #[id number]  Department [facility]-MAIN OFFICE-[address]  Appointment type Hospital Follow-Up  Provider [facility]

## 2020-07-16 NOTE — PROGRESS NOTES
Will hold Cozaar at discharge due to lower BPs and the new addition of BB. Can be restarted out patient pending BP at follow up.              Michaela Camejo, BYRON  DNP, RN, AGACNP-BC

## 2020-07-16 NOTE — PROGRESS NOTES
07:10 - Bedside report rec'd from Nicolasa Galan [employer].  NPO for cath today. NS infusing at 100mL/hr. 10:05 - To CCL. 11:20 - Rec'd patient from [address] [place] [address]. R radial TR band CDI. PH at 12mL. Wife at bedside. Patient drowsy but easily arousable. 13:12 - TR band off. 4x4 & tegaderm applied. Tolerated well. Mild bruising to area, soft. 13:45 - R radial remains bruised but soft. 16:45 - Ambulated in hallway without complaint. R radial remains unchanged. 16:55 - Discharge instructions given to patient. Verbalizes understanding. PIVs and tele removed.

## 2020-07-16 NOTE — PROGRESS NOTES
Bedside shift change report given to Justyna Calderon RN (oncoming nurse) by Nallely Grande RN (offgoing nurse). Report included the following information SBAR, Kardex, Procedure Summary, Intake/Output, MAR, Accordion, Recent Results, Med Rec Status, Cardiac Rhythm NSR, Sinus Wilnette Skeeters, Alarm Parameters , Pre Procedure Checklist, Procedure Verification, Quality Measures and Dual Neuro Assessment.

## 2020-07-16 NOTE — DISCHARGE INSTRUCTIONS
[address] [place] [address]  [phone]        Patient ID:  Idalia Serrano  [id number]  29 y.o.  1970    Admit Date: 7/15/2020    Discharge Date: 7/16/2020     Admitting Physician: Wei Tripp MD     Discharge Physician: Wei Tripp MD    Admission Diagnoses:   STEMI (ST elevation myocardial infarction) [facility]) [I21.3]  STEMI involving right coronary artery [facility]) [I21.11]    Discharge Diagnoses:   Principal Problem:    Acute ST elevation myocardial infarction (STEMI) of inferior wall ([facility] (7/15/2020)    Active Problems:    STEMI (ST elevation myocardial infarction) ([facility] (7/15/2020)      Essential hypertension (9/8/2017)      STEMI involving right coronary artery ([facility] (7/15/2020)      Dyslipidemia (7/15/2020)        Discharge Condition: Good    Cardiology Procedures this Admission:  Left heart catheterization with PCI  EchoCardiogram    Disposition: home    Reference discharge instructions provided by nursing for diet and activity. Signed:  Tao Smallwood NP  7/16/2020  3:35 PM        Radial Cardiac Catheterization/Angiography Discharge Instructions    It is normal to feel tired the first couple days. Take it easy and follow the physicians instructions. CHECK THE CATHETER INSERTION SITE DAILY:    Remove the wrist dressing 24 hours after the procedure. You may shower 24 hours after the procedure. Wash with soap and water and pat dry. Gentle cleaning of the site with soap and water is sufficient, cover with a dry clean dressing or bandage. Do not apply creams or powders to the area. No soaking the wrist for 3 days. Leave the puncture site open to air after 24 hours post-procedure. CALL THE PHYSICIANS:     If the site becomes red, swollen or feels warm to the touch  If there is bleeding or drainage or if there is unusual pain at the radial site. If there is any minor oozing, you may apply a band-aid and remove after 12 hours.    If the bleeding continues, hold pressure with the middle finger against the puncture site and the thumb against the back of the wrist,call 911 to be transported to the hospital.  DO NOT DRIVE YOURSELF, [address]. ACTIVITY:   For the first 24 hours do not manipulate the wrist.  No lifting, pushing or pulling over 3-5 pounds with the affected wrist for 7 daysand no straining the insertion site. Do not life grocery bags or the garbage can, do not run the vacuum  or  for 7 days. Start with short walks as in the hospital and gradually increase as tolerated each day. It is recommended to walk 30 minutes 5-7 days per week. Follow your physicians instructions on activity. Avoid walking outside in extremes of heat or cold. Walk inside when it is cold and windy or hot and humid. Things to keep in mind:  No driving for at least 24 hours, or as designated by your physician. Limit the number of times you go up and down the stairs  Take rests and pace yourself with activity. Be careful and do not strain with bowel movements. MEDICATIONS:    Take all medications as prescribed  Call your physician if you have any questions  Keep an updated list of your medications with you at all times and give a list to your physician and pharmacist    SIGNS AND SYMPTOMS:   Be cautious of symptoms of angina or recurrent symptoms such as chest discomfort, unusual shortness of breath or fatigue. These could be symptoms of restenosis, a new blockage or a heart attack. If your symptoms are relieved with rest it is still recommended that you notify your physician of recurrent chest pain or discomfort. For CHEST PAIN or symptoms of angina not relieved with rest:  If the discomfort is not relieved with rest, and you have been prescribed Nitroglycerin, take as directed (taken under the tongue, one at a time 5 minutes apart for a total of 3 doses).  If the discomfort is not relieved after the 3rd nitroglycerin, call 911. If you have not been prescribed Nitroglycerin  and your chest discomfort is not relieved with rest, call 911. AFTER CARE:   Follow up with your physician as instructed. Follow a heart healthy diet with proper portion control, daily stress management, daily exercise, blood pressure and cholesterol control , and smoking cessation.

## 2020-07-20 NOTE — DISCHARGE SUMMARY
[address] [place], [place] [id number]     Cardiology Discharge Summary     Patient ID:  Sterling Sung  [id number]  52 y.o.  1970    Admit Date: 7/15/2020    Discharge Date: 7/20/2020     Admitting Physician: Nellie Nelson MD     Discharge Physician: Mendel Chaney NP    Admission Diagnoses:   STEMI (ST elevation myocardial infarction) [facility]) [I21.3]  STEMI involving right coronary artery [facility]) [I21.11]    Discharge Diagnoses:   Principal Problem:    Acute ST elevation myocardial infarction (STEMI) of inferior wall ([address] (7/15/2020)    Active Problems:    STEMI (ST elevation myocardial infarction) ([address] (7/15/2020)      Essential hypertension (9/8/2017)      STEMI involving right coronary artery ([address] (7/15/2020)      Dyslipidemia (7/15/2020)        Discharge Condition: Good    Cardiology Procedures this Admission:  Left heart catheterization with PCI  EchoCardiogram    Patient Active Problem List    Diagnosis Date Noted    STEMI (ST elevation myocardial infarction) ([address] 07/15/2020     Priority: 1 - One    Acute ST elevation myocardial infarction (STEMI) of inferior wall ([address] 07/15/2020    STEMI involving right coronary artery ([address] 07/15/2020    Dyslipidemia 07/15/2020    Spondylolisthesis of lumbar region 11/28/2018    S/P lumbar fusion 11/28/2018    Essential hypertension 09/08/2017    GERD (gastroesophageal reflux disease)     Hypercholesteremia       Past Medical History:   Diagnosis Date    Bursitis of shoulder     right; \"comes and goes\"    Dyslipidemia 7/15/2020    Essential hypertension 9/8/2017    GERD (gastroesophageal reflux disease)     Hypercholesteremia     Osteoarthritis       Social History     Socioeconomic History    Marital status:      Spouse name: Not on file    Number of children: Not on file    Years of education: Not on file    Highest education level: Not on file   Tobacco Use    Smoking status: Never Smoker    Smokeless tobacco: Never Used   Substance and Sexual Activity    Alcohol use: No    Drug use: No    Sexual activity: Yes     Partners: Female   Social History Narrative    Works for [employer]. No Known Allergies   Family History   Problem Relation Age of Onset    Other Father         patient unsure, some sort of issues with RBCs of WBCs        Hospital Course: Libia, 49 y.o. male with PMHx significant for hypertension and GERD presents to the ED with chest pain that started about 1 AM.  Patient reports his pain is a \"burning sensation in his lungs\".  It is accompanied by [place] Poke denies any nausea, vomiting, shortness of breath, cough, fever.  He reports his pain is 6 out of 10 in intensity.  He is a non-smoker and does not have a family history of heart disease.  Patient denies personal history of hyperlipidemia.  Patient reports that he exercises regularly and 2 days ago while he was jogging he had a similar sensation in his chest after he had been running for about a mile and a half. PCP: Alina Moore MD    STEMI:  Urgent cath with PCI/JAY distal RCA. Mid RCA and prox LAD 50% lesions notes. Cardiac cath with neg FFR, not felt to be significant.   Continue ASA 81mg daily for life, Brilinta 90mg BID x 1 year, Crestor, and low dose BB as BP tolerates.      Dyslipidemia:  Starting statin     Consults: None    Visit Vitals  /84   Pulse 71   Temp 97.3 °F (36.3 °C)   Resp 16   Ht 6' (1.829 m)   Wt 87.2 kg (192 lb 3.9 oz)   SpO2 96%   BMI 26.07 kg/m²       Physical Exam  General: WNWD  male in   Heart: RRR, no m/S3/JVD, no carotid bruits   Lungs: clear   Abdomen: Soft, +BS, NTND   Extremities: LE bessy +DP/PT, no edema   Neurologic: Grossly normal     Results for Para Gentleman (MRN [id number]) as of 7/20/2020 15:24   7/15/2020 02:46 7/15/2020 05:32 7/16/2020 06:00   Sodium 140 139    Potassium 3.5 3.6    Chloride 106 105    CO2 30 28    Anion gap 4 (L) 6    Glucose 117 (H) 133 (H)    BUN 19 18 Creatinine 1.18 1.15    BUN/Creatinine ratio 16 16    Calcium 8.4 (L) 8.6    GFR est non-AA >60 >60    GFR est AA >60 >60    Bilirubin, total 0.9     Protein, total 6.9     Albumin 3.6     Globulin 3.3     A-G Ratio 1.1     ALT 29     AST 19     Alk. phosphatase 88     Triglyceride  136 242 (H)   Cholesterol, total  180 172   HDL Cholesterol  46 45   CHOL/HDL Ratio  3.9 3.8   VLDL, calculated  27.2 48.4   LDL, calculated  106.8 (H) 78.6   Troponin-I, Qt. <0.05 25.00 (H)          EKG: LAYNE     Echo:Normal cavity size, wall thickness and systolic function (ejection fraction normal). Estimated left ventricular ejection fraction is 60 - 65%. No regional wall motion abnormality noted. Mild (grade 1) left ventricular diastolic dysfunction. Mildly dilated left atrium. Disposition: home    Patient Instructions:   Discharge Medication List as of 7/16/2020  5:54 PM      START taking these medications    Details   aspirin delayed-release 81 mg tablet Take 1 Tab by mouth daily. , Normal, Disp-30 Tab,R-11      metoprolol tartrate (LOPRESSOR) 25 mg tablet Take 0.5 Tabs by mouth every twelve (12) hours. , Normal, Disp-30 Tab,R-11      nitroglycerin (NITROSTAT) 0.4 mg SL tablet 1 Tab by SubLINGual route every five (5) minutes as needed for Chest Pain. Up to 3 doses. , Normal, Disp-1 Bottle,R-1      rosuvastatin (CRESTOR) 20 mg tablet Take 1 Tab by mouth daily. , Normal, Disp-30 Tab,R-11      ticagrelor (BRILINTA) 90 mg tablet Take 1 Tab by mouth every twelve (12) hours every twelve (12) hours. , Normal, Disp-60 Tab,R-11         CONTINUE these medications which have NOT CHANGED    Details   tretinoin (RETIN-A) 0.01 % topical gel Apply  to affected area nightly., Historical Med      MULTIVITAMINS (MULTIPLE VITAMINS PO) Take 1 Tab by mouth daily.  One daily , Historical Med         STOP taking these medications       meloxicam (MOBIC) 7.5 mg tablet Comments:   Reason for Stopping:         losartan (COZAAR) 50 mg tablet Comments: Reason for Stopping:               Referenced discharge instructions provided by nursing for diet and activity. Follow up with Keith Barry      Signed:  Dillon Harrison NP  7/20/2020  3:20 PM

## 2020-07-21 ENCOUNTER — TELEPHONE (OUTPATIENT)
Dept: CARDIOLOGY CLINIC | Age: 50
End: 2020-07-21

## 2020-07-21 NOTE — TELEPHONE ENCOUNTER
Short term Disability Claim through [employer] completed, ph # [phone], fax # [phone]. Faxing completed form, hospital office notes, cath report, echo report, ekg's to them. All paperwork, forms and copies on [place], [facility] [employer].

## 2020-07-22 NOTE — PROGRESS NOTES
Subjective/HPI: Janny Obando is a 52 y.o. male is here for f/u appt after hospitalization for STEMI 7/15/20. He had an urgent cath 7/15 with PCI/JAY to the distal RCA. Mid RCA and prox LAD 50% lesions were noted. He had a staged cardiac cath on 7/16 with neg FFR on these lesions, not felt to be significant. He was d/c on 7/20 on ASA 81mg daily for life, Brilinta 90mg BID x 1 year, Crestor, and Metoprolol. Since d/c the patient denies chest pain/ shortness of breath, orthopnea, PND, LE edema, palpitations, syncope, presyncope or fatigue. PCP Provider  Chetan Mesa MD  Past Medical History:   Diagnosis Date    Bursitis of shoulder     right; \"comes and goes\"    CAD (coronary artery disease)     Dyslipidemia 7/15/2020    Essential hypertension 9/8/2017    GERD (gastroesophageal reflux disease)     Hypercholesteremia     Osteoarthritis       Past Surgical History:   Procedure Laterality Date    HX ACL RECONSTRUCTION Left     HX HERNIA REPAIR      HX LUMBAR LAMINECTOMY  2018    Dr. Africa Cortez; L4-5 level.  HX OTHER SURGICAL      INGROWN TOE NAIL REMOVED OFF L big toe     HX OTHER SURGICAL  03/2018    orthoscopy--R hip    HX REFRACTIVE SURGERY      HX SHOULDER ARTHROSCOPY  3/01/2012    Right shoulder     HX SHOULDER ARTHROSCOPY  04/2014    R shoulder     HX VASECTOMY       No Known Allergies   Family History   Problem Relation Age of Onset    Other Father         patient unsure, some sort of issues with RBCs of WBCs      Current Outpatient Medications   Medication Sig    losartan (COZAAR) 25 mg tablet Take 1 Tab by mouth daily.  aspirin delayed-release 81 mg tablet Take 1 Tab by mouth daily.  metoprolol tartrate (LOPRESSOR) 25 mg tablet Take 0.5 Tabs by mouth every twelve (12) hours.  nitroglycerin (NITROSTAT) 0.4 mg SL tablet 1 Tab by SubLINGual route every five (5) minutes as needed for Chest Pain. Up to 3 doses.     rosuvastatin (CRESTOR) 20 mg tablet Take 1 Tab by mouth daily.  ticagrelor (BRILINTA) 90 mg tablet Take 1 Tab by mouth every twelve (12) hours every twelve (12) hours.  tretinoin (RETIN-A) 0.01 % topical gel Apply  to affected area nightly.  MULTIVITAMINS (MULTIPLE VITAMINS PO) Take 1 Tab by mouth daily. One daily      No current facility-administered medications for this visit. Vitals:    07/24/20 1010 07/24/20 1016   BP: (!) 132/108 (!) 130/104   Pulse: (!) 56    Resp: 18    SpO2: 98%    Weight: 180 lb (81.6 kg)    Height: 6' (1.829 m)      Social History     Socioeconomic History    Marital status:      Spouse name: Not on file    Number of children: Not on file    Years of education: Not on file    Highest education level: Not on file   Occupational History    Not on file   Social Needs    Financial resource strain: Not on file    Food insecurity     Worry: Not on file     Inability: Not on file    Transportation needs     Medical: Not on file     Non-medical: Not on file   Tobacco Use    Smoking status: Never Smoker    Smokeless tobacco: Never Used   Substance and Sexual Activity    Alcohol use: No    Drug use: No    Sexual activity: Yes     Partners: Female   Lifestyle    Physical activity     Days per week: Not on file     Minutes per session: Not on file    Stress: Not on file   Relationships    Social connections     Talks on phone: Not on file     Gets together: Not on file     Attends [employer] service: Not on file     Active member of club or organization: Not on file     Attends meetings of clubs or organizations: Not on file     Relationship status: Not on file    Intimate partner violence     Fear of current or ex partner: Not on file     Emotionally abused: Not on file     Physically abused: Not on file     Forced sexual activity: Not on file   Other Topics Concern    Not on file   Social History Narrative    Works for [employer].        I have reviewed the nurses notes, vitals, problem list, allergy list, medical history, family, social history and medications. Review of Symptoms:    General: Pt denies excessive weight gain or loss. Pt is able to conduct ADL's  HEENT: Denies blurred vision, headaches, epistaxis and difficulty swallowing. Respiratory: Denies shortness of breath, EDEN, wheezing or stridor. Cardiovascular: Denies precordial pain, palpitations, edema or PND  Gastrointestinal: Denies poor appetite, indigestion, abdominal pain or blood in stool  Urinary: Denies dysuria, pyuria  Musculoskeletal: Denies pain or swelling from muscles or joints  Neurologic: Denies tremor, paresthesias, or sensory motor disturbance  Skin: Denies rash, itching or texture change. Psych: Denies depression        Physical Exam:      General: Well developed, in no acute distress, cooperative and alert  HEENT: No carotid bruits, no JVD, trach is midline. Neck Supple, PEERL, EOM intact. Heart:  Normal S1/S2 negative S3 or S4. Regular, no murmur, gallop or rub. Respiratory: Clear bilaterally x 4, no wheezing or rales  Abdomen:   Soft, non-tender, no masses, bowel sounds are active. Extremities:  No edema, normal cap refill, no cyanosis, atraumatic. Neuro: A&Ox3, speech clear, gait stable. Skin: Skin color is normal. No rashes or lesions.  Non diaphoretic  Vascular: 2+ pulses symmetric in all extremities    Cardiographics    ECG: SB  -Inferior infarct     Results for orders placed or performed during the hospital encounter of 07/15/20   EKG, 12 LEAD, INITIAL   Result Value Ref Range    Ventricular Rate 60 BPM    Atrial Rate 60 BPM    P-R Interval 170 ms    QRS Duration 88 ms    Q-T Interval 446 ms    QTC Calculation (Bezet) 446 ms    Calculated P Axis 45 degrees    Calculated R Axis -23 degrees    Calculated T Axis -34 degrees    Diagnosis       Normal sinus rhythm  Inferior infarct , age undetermined with evolutionary changes      Confirmed by Deya Boyd ([id number]) on 7/16/2020 9:10:03 AM           Cardiology Labs:  Lab Results   Component Value Date/Time    Cholesterol, total 172 07/16/2020 06:00 AM    HDL Cholesterol 45 07/16/2020 06:00 AM    LDL, calculated 78.6 07/16/2020 06:00 AM    LDL, calculated 106.8 (H) 07/15/2020 05:32 AM    LDL, calculated 129 (H) 09/20/2019 09:26 AM    VLDL, calculated 48.4 07/16/2020 06:00 AM    CHOL/HDL Ratio 3.8 07/16/2020 06:00 AM     Lab Results   Component Value Date/Time    Sodium 139 07/15/2020 05:32 AM    Potassium 3.6 07/15/2020 05:32 AM    Chloride 105 07/15/2020 05:32 AM    CO2 28 07/15/2020 05:32 AM    Glucose 133 (H) 07/15/2020 05:32 AM    BUN 18 07/15/2020 05:32 AM    Creatinine 1.15 07/15/2020 05:32 AM    BUN/Creatinine ratio 16 07/15/2020 05:32 AM    GFR est AA >60 07/15/2020 05:32 AM    GFR est non-AA >60 07/15/2020 05:32 AM    Calcium 8.6 07/15/2020 05:32 AM    Anion gap 6 07/15/2020 05:32 AM    Bilirubin, total 0.9 07/15/2020 02:46 AM    ALT (SGPT) 29 07/15/2020 02:46 AM    Alk. phosphatase 88 07/15/2020 02:46 AM    Protein, total 6.9 07/15/2020 02:46 AM    Albumin 3.6 07/15/2020 02:46 AM    Globulin 3.3 07/15/2020 02:46 AM    A-G Ratio 1.1 07/15/2020 02:46 AM     Lab Results   Component Value Date/Time    Hemoglobin A1c 5.5 11/20/2018 01:34 PM        Assessment:     Assessment:     Diagnoses and all orders for this visit:    1. Essential hypertension  -     LIPID PANEL  -     METABOLIC PANEL, COMPREHENSIVE  -     CK    2. Coronary artery disease involving native coronary artery of native heart without angina pectoris  -     LIPID PANEL  -     METABOLIC PANEL, COMPREHENSIVE  -     CK    3. Hypercholesteremia  -     LIPID PANEL  -     METABOLIC PANEL, COMPREHENSIVE  -     CK    4. History of ST elevation myocardial infarction (STEMI)  -     LIPID PANEL  -     METABOLIC PANEL, COMPREHENSIVE  -     CK    5.  ASHD (arteriosclerotic heart disease)  -     AMB POC EKG ROUTINE W/ 12 LEADS, INTER & REP  -     LIPID PANEL  -     METABOLIC PANEL, COMPREHENSIVE  -     CK    Other orders  -     losartan (COZAAR) 25 mg tablet; Take 1 Tab by mouth daily. ICD-10-CM ICD-9-CM    1. Essential hypertension  I10 401.9 LIPID PANEL      METABOLIC PANEL, COMPREHENSIVE      CK   2. Coronary artery disease involving native coronary artery of native heart without angina pectoris  I25.10 414.01 LIPID PANEL      METABOLIC PANEL, COMPREHENSIVE      CK   3. Hypercholesteremia  E78.00 272.0 LIPID PANEL      METABOLIC PANEL, COMPREHENSIVE      CK   4. History of ST elevation myocardial infarction (STEMI)  I25.2 412 LIPID PANEL      METABOLIC PANEL, COMPREHENSIVE      CK   5. ASHD (arteriosclerotic heart disease)  I25.10 414.00 AMB POC EKG ROUTINE W/ 12 LEADS, INTER & REP      LIPID PANEL      METABOLIC PANEL, COMPREHENSIVE      CK          Plan:     1. Coronary artery disease involving native coronary artery of native heart without angina pectoris  7/15 admitted with STEMI. Taken urgently for cardiac cath with PCI/JAY to distal RCA. He had a staged cath on 7/16 to evaluate the Mid RCA and prox LAD 50% lesions. Both with neg FFR, felt to be insignificant. He was d/c on ASA, Brilinta, Statin and Metoprolol tartrate 12.5mg bid. Asymptomatic. EKG SB with inferior infarct. Cont current medications. RTW Monday    2. Essential hypertension  BP in hospital was trending too low for ARB and BB. He was d/c home on Metoprolol tartrate 12.5mg bid. Now trending elevated. Add back Losartan 25mg every day. Cont to monitor BP at home. Goal /80 or lower. 3. Hypercholesteremia  On Crestor 20mg. 7/16 LDL 78. Cont statin. Check labs 3 mo    F/U in 6 months.       Khris Santos MD

## 2020-07-24 ENCOUNTER — OFFICE VISIT (OUTPATIENT)
Dept: CARDIOLOGY CLINIC | Age: 50
End: 2020-07-24

## 2020-07-24 VITALS
DIASTOLIC BLOOD PRESSURE: 104 MMHG | HEIGHT: 72 IN | RESPIRATION RATE: 18 BRPM | BODY MASS INDEX: 24.38 KG/M2 | WEIGHT: 180 LBS | HEART RATE: 56 BPM | OXYGEN SATURATION: 98 % | SYSTOLIC BLOOD PRESSURE: 130 MMHG

## 2020-07-24 DIAGNOSIS — I10 ESSENTIAL HYPERTENSION: Primary | ICD-10-CM

## 2020-07-24 DIAGNOSIS — E78.00 HYPERCHOLESTEREMIA: ICD-10-CM

## 2020-07-24 DIAGNOSIS — I25.10 CORONARY ARTERY DISEASE INVOLVING NATIVE CORONARY ARTERY OF NATIVE HEART WITHOUT ANGINA PECTORIS: ICD-10-CM

## 2020-07-24 DIAGNOSIS — I25.10 ASHD (ARTERIOSCLEROTIC HEART DISEASE): ICD-10-CM

## 2020-07-24 DIAGNOSIS — I25.2 HISTORY OF ST ELEVATION MYOCARDIAL INFARCTION (STEMI): ICD-10-CM

## 2020-07-24 RX ORDER — LOSARTAN POTASSIUM 25 MG/1
25 TABLET ORAL DAILY
Qty: 30 TAB | Refills: 5 | Status: SHIPPED | OUTPATIENT
Start: 2020-07-24 | End: 2022-01-02 | Stop reason: SDUPTHER

## 2020-07-24 NOTE — LETTER
7/24/20 Patient: Lyndal Sever YOB: 1970 Date of Visit: 7/24/2020 Dorothy Kevin MD 
[address] [address] VIA In Basket Dear Dorothy Kevin MD, Thank you for referring Mr. Lyndal Sever to [address] [place] [address] for evaluation. My notes for this consultation are attached. If you have questions, please do not hesitate to call me. I look forward to following your patient along with you.  
 
 
Sincerely, 
 
Peyton Oakley MD

## 2020-07-24 NOTE — PROGRESS NOTES
1. Have you been to the ER, urgent care clinic since your last visit? Hospitalized since your last visit? [address] admit 7-, s/p MI, stent. 2. Have you seen or consulted any other health care providers outside of the [address] [place] [address] since your last visit? Include any pap smears or colon screening. No    Chief Complaint   Patient presents with    Coronary Artery Disease     S/P stent 7-. Denied cardiac symptoms.

## 2020-07-24 NOTE — LETTER
NOTIFICATION RETURN TO WORK / SCHOOL 
 
7/24/2020 10:55 AM 
 
Mr. Reina Sánchez [address] [facility] [id number] To Whom It May Concern: 
 
Reina Sánchez is currently under the care of Brett Mclean. He will return to work/school on: 7/27/20. No limitiations If there are questions or concerns please have the patient contact our office.  
 
 
 
Sincerely, 
 
 
Roge Martinez MD

## 2020-07-28 ENCOUNTER — VIRTUAL VISIT (OUTPATIENT)
Dept: INTERNAL MEDICINE CLINIC | Age: 50
End: 2020-07-28

## 2020-07-28 DIAGNOSIS — E78.00 HYPERCHOLESTEREMIA: ICD-10-CM

## 2020-07-28 DIAGNOSIS — I10 ESSENTIAL HYPERTENSION: ICD-10-CM

## 2020-07-28 DIAGNOSIS — I25.110 CORONARY ARTERY DISEASE INVOLVING NATIVE CORONARY ARTERY OF NATIVE HEART WITH UNSTABLE ANGINA PECTORIS (HCC): Primary | ICD-10-CM

## 2020-07-28 DIAGNOSIS — I21.11 ST ELEVATION MYOCARDIAL INFARCTION INVOLVING RIGHT CORONARY ARTERY (HCC): ICD-10-CM

## 2020-07-28 NOTE — PATIENT INSTRUCTIONS
Office Policies Phone calls/patient messages: Please allow up to 24 hours for someone in the office to contact you about your call or message. Be mindful your provider may be out of the office or your message may require further review. We encourage you to use [employer] for your messages as this is a faster, more efficient way to communicate with our office Medication Refills: 
         
Prescription medications require 48-72 business hours to process. We encourage you to use [employer] for your refills. For controlled medications: Please allow 72 business hours to process. Certain medications may require you to  a written prescription at our office. NO narcotic/controlled medications will be prescribed after 4pm Monday through Friday or on weekends Form/Paperwork Completion: 
         
Please note a $25 fee may incur for all paperwork for completed by our providers. We ask that you allow 7-10 business days. Pre-payment is due prior to picking up/faxing the completed form. You may also download your forms to [employer] to have your doctor print off. 
 
 
1. Have you been to the ER, urgent care clinic since your last visit? Hospitalized since your last visit? [facility]  
 
2. Have you seen or consulted any other health care providers outside of the [address] [place] [address] since your last visit? Include any pap smears or colon screening.  no

## 2020-07-28 NOTE — PROGRESS NOTES
Lyndal Sever is a 52 y.o. male who was seen by synchronous (real-time) audio-video technology on 7/28/2020 for Hospital Follow Up (pt f/u [facility] ER for: heart attack )        Assessment & Plan:     Diagnoses and all orders for this visit:    1. Coronary artery disease involving native coronary artery of native heart with unstable angina pectoris ([address]    2. ST elevation myocardial infarction involving right coronary artery ([facility]    3. Essential hypertension    4. Hypercholesteremia      Follow-up and Dispositions    · Return in about 4 months (around 11/28/2020) for HTN. Subjective:     Chief Complaint   Patient presents with   [facility] Follow Up     pt f/u [facility] ER for: heart attack      He was hospitalized 7/15-7/20 on the cardiology service, for STEMI:     From HPI:    Libia, 49 y.o. male with PMHx significant for hypertension and GERD presents to the ED with chest pain that started about 1 AM.  Patient reports his pain is a \"burning sensation in his lungs\".  It is accompanied by Raenelle Goad denies any nausea, vomiting, shortness of breath, cough, fever.  He reports his pain is 6 out of 10 in intensity.  He is a non-smoker and does not have a family history of heart disease.  Patient denies personal history of hyperlipidemia.  Patient reports that he exercises regularly and 2 days ago while he was jogging he had a similar sensation in his chest after he had been running for about a mile and a half. Hospital Course:      STEMI:  Urgent cath with PCI/JAY distal RCA.  Mid RCA and prox LAD 50% lesions notes.  Cardiac cath with neg FFR, not felt to be significant. Continue ASA 81mg daily for life, Brilinta 90mg BID x 1 year, Crestor, and low dose BB as BP tolerates.      Dyslipidemia:  Starting statin    Now, he is out of the hospital and back to his normal routine. He is on [address] and tolerating.       He has had a follow up appointment with Dr. Uriah Doss last week; next one is scheduled for February. Past Medical History:   Diagnosis Date    Bursitis of shoulder     right; \"comes and goes\"    CAD (coronary artery disease)     Dyslipidemia 7/15/2020    Essential hypertension 9/8/2017    GERD (gastroesophageal reflux disease)     Heart attack ([facility]     Hypercholesteremia     Osteoarthritis        Prior to Admission medications    Medication Sig Start Date End Date Taking? Authorizing Provider   losartan (COZAAR) 25 mg tablet Take 1 Tab by mouth daily. 7/24/20  Yes Cary VALDEZ NP   aspirin delayed-release 81 mg tablet Take 1 Tab by mouth daily. 7/16/20  Yes Ezekiel Mejia NP   metoprolol tartrate (LOPRESSOR) 25 mg tablet Take 0.5 Tabs by mouth every twelve (12) hours. 7/16/20  Yes Ezekiel Mejia NP   nitroglycerin (NITROSTAT) 0.4 mg SL tablet 1 Tab by SubLINGual route every five (5) minutes as needed for Chest Pain. Up to 3 doses. 7/16/20  Yes Ezekiel Mejia NP   rosuvastatin (CRESTOR) 20 mg tablet Take 1 Tab by mouth daily. 7/17/20  Yes Ezekiel Mejia NP   ticagrelor (BRILINTA) 90 mg tablet Take 1 Tab by mouth every twelve (12) hours every twelve (12) hours. 7/16/20  Yes Ezekiel Mejia NP   tretinoin (RETIN-A) 0.01 % topical gel Apply  to affected area nightly. Yes Provider, Historical   MULTIVITAMINS (MULTIPLE VITAMINS PO) Take 1 Tab by mouth daily. One daily    Yes Provider, Historical     SH:  reports that he has never smoked. He has never used smokeless tobacco. He reports that he does not drink alcohol or use drugs. ROS: Complete review of systems was performed and is otherwise unremarkable except as noted elsewhere. Objective:   No flowsheet data found.      [INSTRUCTIONS:  \"[x]\" Indicates a positive item  \"[]\" Indicates a negative item  -- DELETE ALL ITEMS NOT EXAMINED]    Constitutional: [x] Appears well-developed and well-nourished [x] No apparent distress      [] Abnormal -     Mental status: [x] Alert and awake  [x] Oriented to person/place/time [x] Able to follow commands    [] Abnormal -     Eyes:   EOM    [x]  Normal    [] Abnormal -   Sclera  [x]  Normal    [] Abnormal -          Discharge [x]  None visible   [] Abnormal -     HENT: [x] Normocephalic, atraumatic  [] Abnormal -   [x] Mouth/Throat: Mucous membranes are moist    External Ears [x] Normal  [] Abnormal -    Neck: [x] No visualized mass [] Abnormal -     Pulmonary/Chest: [x] Respiratory effort normal   [x] No visualized signs of difficulty breathing or respiratory distress        [] Abnormal -      Musculoskeletal:   [x] Normal gait with no signs of ataxia         [x] Normal range of motion of neck        [] Abnormal -     Neurological:        [x] No Facial Asymmetry (Cranial nerve 7 motor function) (limited exam due to video visit)          [x] No gaze palsy        [] Abnormal -          Skin:        [x] No significant exanthematous lesions or discoloration noted on facial skin         [] Abnormal -            Psychiatric:       [x] Normal Affect [] Abnormal -        [x] No Hallucinations    Other pertinent observable physical exam findings:-        We discussed the expected course, resolution and complications of the diagnosis(es) in detail. Medication risks, benefits, costs, interactions, and alternatives were discussed as indicated. I advised him to contact the office if his condition worsens, changes or fails to improve as anticipated. He expressed understanding with the diagnosis(es) and plan. Darwin Ching, who was evaluated through a patient-initiated, synchronous (real-time) audio-video encounter, and/or his healthcare decision maker, is aware that it is a billable service, with coverage as determined by his insurance carrier. He provided verbal consent to proceed: Yes, and patient identification was verified.  It was conducted pursuant to the emergency declaration under the [facility] waiver authority and the Coronavirus Preparedness and Response Supplemental Appropriations Act. A caregiver was present when appropriate. Ability to conduct physical exam was limited. I was in the office. The patient was at home.       Jayjay Cisneros MD

## 2020-08-14 ENCOUNTER — TELEPHONE (OUTPATIENT)
Dept: CARDIAC REHAB | Age: 50
End: 2020-08-14

## 2020-10-17 LAB
ALBUMIN SERPL-MCNC: 4.6 G/DL (ref 4–5)
ALBUMIN/GLOB SERPL: 2.1 {RATIO} (ref 1.2–2.2)
ALP SERPL-CCNC: 76 IU/L (ref 39–117)
ALT SERPL-CCNC: 53 IU/L (ref 0–44)
AST SERPL-CCNC: 27 IU/L (ref 0–40)
BILIRUB SERPL-MCNC: 1.7 MG/DL (ref 0–1.2)
BUN SERPL-MCNC: 8 MG/DL (ref 6–24)
BUN/CREAT SERPL: 6 (ref 9–20)
CALCIUM SERPL-MCNC: 9.5 MG/DL (ref 8.7–10.2)
CHLORIDE SERPL-SCNC: 103 MMOL/L (ref 96–106)
CHOLEST SERPL-MCNC: 126 MG/DL (ref 100–199)
CK SERPL-CCNC: 180 U/L (ref 49–439)
CO2 SERPL-SCNC: 26 MMOL/L (ref 20–29)
CREAT SERPL-MCNC: 1.29 MG/DL (ref 0.76–1.27)
GLOBULIN SER CALC-MCNC: 2.2 G/DL (ref 1.5–4.5)
GLUCOSE SERPL-MCNC: 83 MG/DL (ref 65–99)
HDLC SERPL-MCNC: 55 MG/DL
INTERPRETATION, 910389: NORMAL
LDLC SERPL CALC-MCNC: 54 MG/DL (ref 0–99)
POTASSIUM SERPL-SCNC: 4.4 MMOL/L (ref 3.5–5.2)
PROT SERPL-MCNC: 6.8 G/DL (ref 6–8.5)
SODIUM SERPL-SCNC: 143 MMOL/L (ref 134–144)
TRIGL SERPL-MCNC: 91 MG/DL (ref 0–149)
VLDLC SERPL CALC-MCNC: 17 MG/DL (ref 5–40)

## 2020-10-20 ENCOUNTER — TELEPHONE (OUTPATIENT)
Dept: CARDIOLOGY CLINIC | Age: 50
End: 2020-10-20

## 2020-10-20 NOTE — TELEPHONE ENCOUNTER
Spoke with patient. Verified patient with two patient identifiers. Advised lipids at target, TG greatly improved. Keep us the good work. Patient verbalized understanding.

## 2020-11-16 ENCOUNTER — OFFICE VISIT (OUTPATIENT)
Dept: INTERNAL MEDICINE CLINIC | Age: 50
End: 2020-11-16
Payer: COMMERCIAL

## 2020-11-16 VITALS
RESPIRATION RATE: 18 BRPM | TEMPERATURE: 96.3 F | SYSTOLIC BLOOD PRESSURE: 121 MMHG | HEART RATE: 52 BPM | BODY MASS INDEX: 24.43 KG/M2 | DIASTOLIC BLOOD PRESSURE: 78 MMHG | WEIGHT: 180.4 LBS | HEIGHT: 72 IN | OXYGEN SATURATION: 98 %

## 2020-11-16 DIAGNOSIS — Z12.11 SCREEN FOR COLON CANCER: ICD-10-CM

## 2020-11-16 DIAGNOSIS — Z00.00 ROUTINE GENERAL MEDICAL EXAMINATION AT A HEALTH CARE FACILITY: Primary | ICD-10-CM

## 2020-11-16 PROCEDURE — 99396 PREV VISIT EST AGE 40-64: CPT | Performed by: INTERNAL MEDICINE

## 2020-11-16 NOTE — PATIENT INSTRUCTIONS
Office Policies Phone calls/patient messages: Please allow up to 24 hours for someone in the office to contact you about your call or message. Be mindful your provider may be out of the office or your message may require further review. We encourage you to use [employer] for your messages as this is a faster, more efficient way to communicate with our office Medication Refills: 
         
Prescription medications require 48-72 business hours to process. We encourage you to use [employer] for your refills. For controlled medications: Please allow 72 business hours to process. Certain medications may require you to  a written prescription at our office. NO narcotic/controlled medications will be prescribed after 4pm Monday through Friday or on weekends Form/Paperwork Completion: 
         
Please note a $25 fee may incur for all paperwork for completed by our providers. We ask that you allow 7-10 business days. Pre-payment is due prior to picking up/faxing the completed form. You may also download your forms to [employer] to have your doctor print off. 
 
 
1. Have you been to the ER, urgent care clinic since your last visit? Hospitalized since your last visit?no 2. Have you seen or consulted any other health care providers outside of the [address] [place] [address] since your last visit? Include any pap smears or colon screening.  no

## 2020-11-16 NOTE — PROGRESS NOTES
SUBJECTIVE:   Mr. Landon Santos is a 52 y.o. male who is here for follow up of routine medical issues. Chief Complaint   Patient presents with    Complete Physical       L knee stable. He sees Dr. Arvind Tobin for CAD. It is recalled he had MI in July 2020, with Cath / stent distal RCA; cath also showed 50% lesions in mid RCA and prox LAD. Back is fine. He had spinal fusion Dr. Tye Boyer in November 2018. At this time, he is otherwise doing well and has brought no other complaints to my attention today. For a list of the medical issues addressed today, see the assessment and plan below. PMH:   Past Medical History:   Diagnosis Date    Bursitis of shoulder     right; \"comes and goes\"    CAD (coronary artery disease)     Dyslipidemia 7/15/2020    Essential hypertension 9/8/2017    GERD (gastroesophageal reflux disease)     Heart attack ([address]     Hypercholesteremia     Osteoarthritis        PSH:   Past Surgical History:   Procedure Laterality Date    HX ACL RECONSTRUCTION Left     HX HERNIA REPAIR      HX LUMBAR LAMINECTOMY  2018    Dr. Tye Boyer; L4-5 level.  HX OTHER SURGICAL      INGROWN TOE NAIL REMOVED OFF L big toe     HX OTHER SURGICAL  03/2018    orthoscopy--R hip    HX REFRACTIVE SURGERY      HX SHOULDER ARTHROSCOPY  3/01/2012    Right shoulder     HX SHOULDER ARTHROSCOPY  04/2014    R shoulder     HX VASECTOMY         All: He has No Known Allergies. MEDS:   Current Outpatient Medications   Medication Sig    losartan (COZAAR) 25 mg tablet Take 1 Tab by mouth daily.  aspirin delayed-release 81 mg tablet Take 1 Tab by mouth daily.  metoprolol tartrate (LOPRESSOR) 25 mg tablet Take 0.5 Tabs by mouth every twelve (12) hours.  nitroglycerin (NITROSTAT) 0.4 mg SL tablet 1 Tab by SubLINGual route every five (5) minutes as needed for Chest Pain. Up to 3 doses.  rosuvastatin (CRESTOR) 20 mg tablet Take 1 Tab by mouth daily.     ticagrelor (BRILINTA) 90 mg tablet Take 1 Tab by mouth every twelve (12) hours every twelve (12) hours.  tretinoin (RETIN-A) 0.01 % topical gel Apply  to affected area nightly.  MULTIVITAMINS (MULTIPLE VITAMINS PO) Take 1 Tab by mouth daily. One daily      No current facility-administered medications for this visit. FH: Father has a hematologic disorder. Mother has sciatica. Two sisters alive and well. SH: . Lifts weight. He reports that he has never smoked. He has never used smokeless tobacco. He reports that he does not drink alcohol or use drugs. ROS: See above; Complete ROS otherwise negative. OBJECTIVE:   Vitals:   Visit Vitals  /78 (BP 1 Location: Left arm, BP Patient Position: Sitting)   Pulse (!) 52   Temp (!) 96.3 °F (35.7 °C) (Temporal)   Resp 18   Ht 6' (1.829 m)   Wt 180 lb 6.4 oz (81.8 kg)   SpO2 98%   BMI 24.47 kg/m²      Gen: Pleasant 52 y.o.  male in NAD. HEENT: PERRLA. EOMI. OP moist and pink. Neck: Supple. No LAD. HEART: RRR, No M/G/R.    LUNGS: CTAB No W/R. ABDOMEN: S, NT, ND, BS+. EXTREMITIES: Warm. No C/C/E.  MUSCULOSKELETAL: Normal ROM, muscle strength 5/5 all groups. L knee crepitus with flexion and extension. NEURO: Alert and oriented x 3. Cranial nerves grossly intact. No focal sensory or motor deficits noted. SKIN: Warm. Dry. No rashes or other lesions noted. ASSESSMENT/ PLAN:   CPE: Normal exam.     1. Hypertension: BP fine; previously controlled. 2. Hypercholesterolemia: Due for recheck. - METABOLIC PANEL, COMPREHENSIVE  - LIPID PANEL  3. GERD (gastroesophageal reflux disease): Currently quiescent. Off meds. 4. CAD: Sees Dr. Najma Gonzalez. 5. Weight: Diet and exercise. I have reviewed the patient's medications and risks/side effects/benefits were discussed. Diagnosis(-es) explained to patient and questions answered. Literature provided where appropriate.         Follow-up and Dispositions    · Return in about 6 months (around 5/16/2021) for lipids, etc. Discussed the patient's BMI with him. The BMI follow up plan is as follows:     dietary management education, guidance, and counseling  encourage exercise  monitor weight  prescribed dietary intake    An After Visit Summary was printed and given to the patient.

## 2021-03-26 NOTE — PROGRESS NOTES
Subjective/HPI: Pavel Tan is a 48 y.o. male is here for f/u appt. Last seen in our office 7/24/20 after a STEMI 7/15/20 with PCI/JAY to the distal RCA. He denies chest pain, SOB/EDEN, orthopnea, PND, or LE edema. He denies episodes of palpitations, syncope, presyncope or fatigue. He reports he is having hip pain, hx of osteoarthritis, likely will need surgery. PCP Provider  Eleanor Daly MD  Past Medical History:   Diagnosis Date    Bursitis of shoulder     right; \"comes and goes\"    CAD (coronary artery disease)     Dyslipidemia 7/15/2020    Essential hypertension 9/8/2017    GERD (gastroesophageal reflux disease)     Heart attack ([address]     Hypercholesteremia     Osteoarthritis       Past Surgical History:   Procedure Laterality Date    HX ACL RECONSTRUCTION Left     HX HERNIA REPAIR      HX LUMBAR LAMINECTOMY  2018    Dr. Mari Rivas; L4-5 level.  HX OTHER SURGICAL      INGROWN TOE NAIL REMOVED OFF L big toe     HX OTHER SURGICAL  03/2018    orthoscopy--R hip    HX REFRACTIVE SURGERY      HX SHOULDER ARTHROSCOPY  3/01/2012    Right shoulder     HX SHOULDER ARTHROSCOPY  04/2014    R shoulder     HX VASECTOMY       No Known Allergies   Family History   Problem Relation Age of Onset    Other Father         patient unsure, some sort of issues with RBCs of WBCs      Current Outpatient Medications   Medication Sig    losartan (COZAAR) 25 mg tablet Take 1 Tab by mouth daily.  aspirin delayed-release 81 mg tablet Take 1 Tab by mouth daily.  metoprolol tartrate (LOPRESSOR) 25 mg tablet Take 0.5 Tabs by mouth every twelve (12) hours.  nitroglycerin (NITROSTAT) 0.4 mg SL tablet 1 Tab by SubLINGual route every five (5) minutes as needed for Chest Pain. Up to 3 doses.  rosuvastatin (CRESTOR) 20 mg tablet Take 1 Tab by mouth daily.  ticagrelor (BRILINTA) 90 mg tablet Take 1 Tab by mouth every twelve (12) hours every twelve (12) hours.     tretinoin (RETIN-A) 0.01 % topical gel Apply  to affected area nightly.  MULTIVITAMINS (MULTIPLE VITAMINS PO) Take 1 Tab by mouth daily. One daily      No current facility-administered medications for this visit. Vitals:    03/29/21 0935 03/29/21 0936   BP: (!) 140/80 130/80   Pulse: (!) 54    Resp: 16    SpO2: 99%    Weight: 188 lb 11.2 oz (85.6 kg)    Height: 5' 6\" (1.676 m)      Social History     Socioeconomic History    Marital status:      Spouse name: Not on file    Number of children: Not on file    Years of education: Not on file    Highest education level: Not on file   Occupational History    Not on file   Social Needs    Financial resource strain: Not on file    Food insecurity     Worry: Not on file     Inability: Not on file    Transportation needs     Medical: Not on file     Non-medical: Not on file   Tobacco Use    Smoking status: Never Smoker    Smokeless tobacco: Never Used   Substance and Sexual Activity    Alcohol use: No    Drug use: No    Sexual activity: Yes     Partners: Female   Lifestyle    Physical activity     Days per week: Not on file     Minutes per session: Not on file    Stress: Not on file   Relationships    Social connections     Talks on phone: Not on file     Gets together: Not on file     Attends Congregational service: Not on file     Active member of club or organization: Not on file     Attends meetings of clubs or organizations: Not on file     Relationship status: Not on file    Intimate partner violence     Fear of current or ex partner: Not on file     Emotionally abused: Not on file     Physically abused: Not on file     Forced sexual activity: Not on file   Other Topics Concern    Not on file   Social History Narrative    Works for [employer]. I have reviewed the nurses notes, vitals, problem list, allergy list, medical history, family, social history and medications. Review of Symptoms:    General: Pt denies excessive weight gain or loss.  Pt is able to conduct ADL's  HEENT: Denies blurred vision, headaches, epistaxis and difficulty swallowing. Respiratory: Denies shortness of breath, EDEN, wheezing or stridor. Cardiovascular: Denies precordial pain, palpitations, edema or PND  Gastrointestinal: Denies poor appetite, indigestion, abdominal pain or blood in stool  Urinary: Denies dysuria, pyuria  Musculoskeletal: +hip pain denies swelling from muscles or joints  Neurologic: Denies tremor, paresthesias, or sensory motor disturbance  Skin: Denies rash, itching or texture change. Psych: Denies depression        Physical Exam:      General: Well developed, in no acute distress, cooperative and alert  HEENT: No carotid bruits, no JVD, trach is midline. Neck Supple, PEERL, EOM intact. Heart:  Normal S1/S2 negative S3 or S4. Regular, no murmur, gallop or rub. Respiratory: Clear bilaterally x 4, no wheezing or rales  Abdomen:   Soft, non-tender, no masses, bowel sounds are active. Extremities:  No edema, normal cap refill, no cyanosis, atraumatic. Neuro: A&Ox3, speech clear, gait stable. Skin: Skin color is normal. No rashes or lesions.  Non diaphoretic  Vascular: 2+ pulses symmetric in all extremities    Cardiographics    ECG: SB, HR 54  -Inferior infarct     Results for orders placed or performed during the hospital encounter of 07/15/20   EKG, 12 LEAD, INITIAL   Result Value Ref Range    Ventricular Rate 60 BPM    Atrial Rate 60 BPM    P-R Interval 170 ms    QRS Duration 88 ms    Q-T Interval 446 ms    QTC Calculation (Bezet) 446 ms    Calculated P Axis 45 degrees    Calculated R Axis -23 degrees    Calculated T Axis -34 degrees    Diagnosis       Normal sinus rhythm  Inferior infarct , age undetermined with evolutionary changes      Confirmed by Clyde Hanson ([id number]) on 7/16/2020 9:10:03 AM           Cardiology Labs:  Lab Results   Component Value Date/Time    Cholesterol, total 126 10/16/2020 08:30 AM    HDL Cholesterol 55 10/16/2020 08:30 AM    LDL, calculated 54 10/16/2020 08:30 AM    LDL, calculated 78.6 07/16/2020 06:00 AM    LDL, calculated 106.8 (H) 07/15/2020 05:32 AM    LDL, calculated 129 (H) 09/20/2019 09:26 AM    VLDL, calculated 17 10/16/2020 08:30 AM    VLDL, calculated 48.4 07/16/2020 06:00 AM    CHOL/HDL Ratio 3.8 07/16/2020 06:00 AM     Lab Results   Component Value Date/Time    Sodium 143 10/16/2020 08:30 AM    Potassium 4.4 10/16/2020 08:30 AM    Chloride 103 10/16/2020 08:30 AM    CO2 26 10/16/2020 08:30 AM    Glucose 83 10/16/2020 08:30 AM    BUN 8 10/16/2020 08:30 AM    Creatinine 1.29 (H) 10/16/2020 08:30 AM    BUN/Creatinine ratio 6 (L) 10/16/2020 08:30 AM    GFR est AA 75 10/16/2020 08:30 AM    GFR est non-AA 65 10/16/2020 08:30 AM    Calcium 9.5 10/16/2020 08:30 AM    Anion gap 6 07/15/2020 05:32 AM    Bilirubin, total 1.7 (H) 10/16/2020 08:30 AM    ALT (SGPT) 53 (H) 10/16/2020 08:30 AM    Alk. phosphatase 76 10/16/2020 08:30 AM    Protein, total 6.8 10/16/2020 08:30 AM    Albumin 4.6 10/16/2020 08:30 AM    Globulin 3.3 07/15/2020 02:46 AM    A-G Ratio 2.1 10/16/2020 08:30 AM     Lab Results   Component Value Date/Time    Hemoglobin A1c 5.5 11/20/2018 01:34 PM        Assessment:     Assessment:     Diagnoses and all orders for this visit:    1. Coronary artery disease involving native coronary artery of native heart without angina pectoris  -     AMB POC EKG ROUTINE W/ 12 LEADS, INTER & REP    2. Essential hypertension  -     AMB POC EKG ROUTINE W/ 12 LEADS, INTER & REP    3. Hypercholesteremia  -     AMB POC EKG ROUTINE W/ 12 LEADS, INTER & REP    4. History of ST elevation myocardial infarction (STEMI)  -     AMB POC EKG ROUTINE W/ 12 LEADS, INTER & REP        ICD-10-CM ICD-9-CM    1. Coronary artery disease involving native coronary artery of native heart without angina pectoris  I25.10 414.01 AMB POC EKG ROUTINE W/ 12 LEADS, INTER & REP   2. Essential hypertension  I10 401.9 AMB POC EKG ROUTINE W/ 12 LEADS, INTER & REP   3. Hypercholesteremia  E78.00 272.0 AMB POC EKG ROUTINE W/ 12 LEADS, INTER & REP   4. History of ST elevation myocardial infarction (STEMI)  I25.2 412 AMB POC EKG ROUTINE W/ 12 LEADS, INTER & REP          Plan:     1. Coronary artery disease involving native coronary artery of native heart without angina pectoris  Hx of 7/15/20 STEMI with cardiac cath PCI/JAY to distal RCA. He had a staged cath on 7/16 to evaluate the Mid RCA and prox LAD 50% lesions. Both with neg FFR, felt to be insignificant. Today he reports that he is not experiencing symptoms of CP or other anginal equivalent symptoms. EKG SB with inferior infarct, without significant changes c/w EKG 7/24/20. He is asking about potential surgery for his hip. Discussed he would need to wait 1 year post stent to have elective surgery. If at that time he remains asymptomatic can consider holding blood thinners for procedure. Also discussed he is a candidate to continue Brilinta at 60mg bid until the end of 12/21, can revisit at his f/u appt. Cont Brilinta 90mg bid, continue ASA, Metoprolol tartrate 12.5mg bid, and Crestor 20mg. 2. Essential hypertension  Well controlled on Metoprolol tartrate 12.5mg bid, Losartan 25mg every day. 3. Hypercholesteremia  On Crestor 20mg. 7/16/20 LDL 78. 10/16/20 LDL 54. Cont Crestor 20mg daily. Check labs now    F/U in 6 months with Dr Emily Vasquez, sooner prn.       Alivia Keith NP

## 2021-03-29 ENCOUNTER — OFFICE VISIT (OUTPATIENT)
Dept: CARDIOLOGY CLINIC | Age: 51
End: 2021-03-29
Payer: COMMERCIAL

## 2021-03-29 VITALS
HEIGHT: 66 IN | BODY MASS INDEX: 30.33 KG/M2 | HEART RATE: 54 BPM | WEIGHT: 188.7 LBS | SYSTOLIC BLOOD PRESSURE: 130 MMHG | RESPIRATION RATE: 16 BRPM | DIASTOLIC BLOOD PRESSURE: 80 MMHG | OXYGEN SATURATION: 99 %

## 2021-03-29 DIAGNOSIS — I10 ESSENTIAL HYPERTENSION: ICD-10-CM

## 2021-03-29 DIAGNOSIS — I25.2 HISTORY OF ST ELEVATION MYOCARDIAL INFARCTION (STEMI): ICD-10-CM

## 2021-03-29 DIAGNOSIS — I25.10 CORONARY ARTERY DISEASE INVOLVING NATIVE CORONARY ARTERY OF NATIVE HEART WITHOUT ANGINA PECTORIS: Primary | ICD-10-CM

## 2021-03-29 DIAGNOSIS — E78.00 HYPERCHOLESTEREMIA: ICD-10-CM

## 2021-03-29 PROCEDURE — 99214 OFFICE O/P EST MOD 30 MIN: CPT | Performed by: NURSE PRACTITIONER

## 2021-03-29 PROCEDURE — 93000 ELECTROCARDIOGRAM COMPLETE: CPT | Performed by: NURSE PRACTITIONER

## 2021-03-29 NOTE — PROGRESS NOTES
Chief Complaint   Patient presents with    Follow-up    Cholesterol Problem    Hypertension    Coronary Artery Disease     1. Have you been to the ER, urgent care clinic since your last visit? No  Hospitalized since your last visit? No    2. Have you seen or consulted any other health care providers outside of the [address] [place] [address] since your last visit? No  Include any pap smears or colon screening.  No.    Patient had Covid in Jan 21 not hospitalized

## 2021-03-29 NOTE — LETTER
3/29/2021 Patient: Eather Spurling YOB: 1970 Date of Visit: 3/29/2021 Ken Michael MD 
[address] [address] [address] [employer] Dear Ken Michael MD, Thank you for referring Mr. Eather Spurling to [address] [place] [address] for evaluation. My notes for this consultation are attached. If you have questions, please do not hesitate to call me. I look forward to following your patient along with you. Sincerely, Alivia Keith NP

## 2021-03-30 LAB
ALBUMIN SERPL-MCNC: 4.6 G/DL (ref 4–5)
ALBUMIN/GLOB SERPL: 1.8 {RATIO} (ref 1.2–2.2)
ALP SERPL-CCNC: 78 IU/L (ref 39–117)
ALT SERPL-CCNC: 46 IU/L (ref 0–44)
AST SERPL-CCNC: 28 IU/L (ref 0–40)
BILIRUB SERPL-MCNC: 1 MG/DL (ref 0–1.2)
BUN SERPL-MCNC: 11 MG/DL (ref 6–24)
BUN/CREAT SERPL: 9 (ref 9–20)
CALCIUM SERPL-MCNC: 10 MG/DL (ref 8.7–10.2)
CHLORIDE SERPL-SCNC: 103 MMOL/L (ref 96–106)
CHOLEST SERPL-MCNC: 120 MG/DL (ref 100–199)
CK SERPL-CCNC: 140 U/L (ref 49–439)
CO2 SERPL-SCNC: 28 MMOL/L (ref 20–29)
CREAT SERPL-MCNC: 1.19 MG/DL (ref 0.76–1.27)
GLOBULIN SER CALC-MCNC: 2.6 G/DL (ref 1.5–4.5)
GLUCOSE SERPL-MCNC: 82 MG/DL (ref 65–99)
HDLC SERPL-MCNC: 41 MG/DL
IMP & REVIEW OF LAB RESULTS: NORMAL
LDLC SERPL CALC-MCNC: 52 MG/DL (ref 0–99)
POTASSIUM SERPL-SCNC: 4.5 MMOL/L (ref 3.5–5.2)
PROT SERPL-MCNC: 7.2 G/DL (ref 6–8.5)
SODIUM SERPL-SCNC: 143 MMOL/L (ref 134–144)
TRIGL SERPL-MCNC: 160 MG/DL (ref 0–149)
VLDLC SERPL CALC-MCNC: 27 MG/DL (ref 5–40)

## 2021-03-30 NOTE — PROGRESS NOTES
mychart message  Your labs look good. Kidneys, liver and electrolytes normal. Cholesterol is better, LDL 52, goal 70 or lower. HDL 41 with goal 40 or higher. Trig went up to 160, goal 150 or lower.  That is most impacted by diet/exercise so be sure to follow low fat/carb diet and exercise routinely

## 2021-04-19 RX ORDER — METOPROLOL TARTRATE 25 MG/1
12.5 TABLET, FILM COATED ORAL EVERY 12 HOURS
Qty: 90 TAB | Refills: 1 | Status: SHIPPED | OUTPATIENT
Start: 2021-04-19 | End: 2021-11-22

## 2021-04-19 RX ORDER — ROSUVASTATIN CALCIUM 20 MG/1
20 TABLET, COATED ORAL DAILY
Qty: 30 TAB | Refills: 1 | Status: SHIPPED | OUTPATIENT
Start: 2021-04-19 | End: 2021-07-02 | Stop reason: SDUPTHER

## 2021-04-19 NOTE — TELEPHONE ENCOUNTER
Patient needs Metoprolol please send to [facility] mailorder    Thanks  Fredi Strange

## 2021-07-02 RX ORDER — ROSUVASTATIN CALCIUM 20 MG/1
TABLET, COATED ORAL
Qty: 90 TABLET | Refills: 3 | Status: SHIPPED | OUTPATIENT
Start: 2021-07-02

## 2021-07-29 ENCOUNTER — TELEPHONE (OUTPATIENT)
Dept: CARDIOLOGY CLINIC | Age: 51
End: 2021-07-29

## 2021-07-29 NOTE — TELEPHONE ENCOUNTER
Spoke with patient. Verified patient with two patient identifiers. States he just needs preop clearance from us    Received fax from Dr. Bell Blum office, ph # [phone], fax # [phone]. Verified patient with two patient identifiers. Asking if pt is cleared for left total hip replacement on 9-. May stay on ASA. Asking if pt can hold Brilinta, how many days preop, resume when? Pls advise.

## 2021-07-29 NOTE — TELEPHONE ENCOUNTER
He can be cleared from cardiology perspective to have his surgery. Hold Brilinta for 7 days prior. Resume as soon as stable to do so. Lets go ahead and reduce his dose to 60mg bid.

## 2021-07-30 NOTE — TELEPHONE ENCOUNTER
Spoke with patient. Verified patient with two patient identifiers. Advised cleared for surgery. Advised discuss with surgeon but from a cardiology standpoint can hold Brilinta for 7 days preop, resume ASAP after when stable. Advised decrease Brilinta to 60 mg BID. Patient verbalized understanding. Faxed to Dr. Avila Mayes, fax # [phone].

## 2021-09-02 ENCOUNTER — TELEPHONE (OUTPATIENT)
Dept: CARDIOLOGY CLINIC | Age: 51
End: 2021-09-02

## 2021-09-02 NOTE — TELEPHONE ENCOUNTER
Lavon Miranda NP         1:04 PM  Note     He can be cleared from cardiology perspective to have his surgery. Hold Brilinta for 7 days prior. Resume as soon as stable to do so. Lets go ahead and reduce his dose to 60mg bid.

## 2021-09-02 NOTE — TELEPHONE ENCOUNTER
MARCO for Kelley with [facility]. Correct ph # [phone]. Verified patient with two patient identifiers. May resume Brlinta as soon as stable to so, decision up to surgeon. Call back with any questions.

## 2021-09-02 NOTE — TELEPHONE ENCOUNTER
Kelley with [facility] asking about the cardiac clearance that was received for this patients Hip replacement surgery tomorrow- want to know when patient can start the Stephan Beckettin again after surgery- She just wants a call to be extra cautious- please call her at [phone].     Thanks  [employer]

## 2021-09-03 ENCOUNTER — TELEPHONE (OUTPATIENT)
Dept: CARDIOLOGY CLINIC | Age: 51
End: 2021-09-03

## 2021-09-13 RX ORDER — TRETIONIN 0.1 MG/G
GEL TOPICAL
Qty: 15 G | Refills: 1 | Status: SHIPPED | OUTPATIENT
Start: 2021-09-13 | End: 2021-10-25 | Stop reason: SDUPTHER

## 2021-09-13 NOTE — TELEPHONE ENCOUNTER
Future Appointments:  Future Appointments   Date Time Provider Nishant Mantilla   10/6/2021  9:00 AM Edil Duran MD [facility] BS AMB   11/22/2021  4:00 PM Lisa Elkins MD [facility] BS AMB        Last Appointment With Me:  Visit date not found     Requested Prescriptions     Pending Prescriptions Disp Refills    tretinoin (Retin-A) 0.01 % topical gel 15 g 1     Sig: Apply  to affected area nightly.

## 2021-09-13 NOTE — TELEPHONE ENCOUNTER
----- Message from Jose Ramon Brito sent at 9/13/2021  1:32 PM EDT -----  Regarding: Prescription Question  Contact: [phone]  I'd like to get a prescription refill for adult acne. I had been prescribed tretinoin cream 0.025%, generic, by my previous doctor.

## 2021-09-21 ENCOUNTER — TELEPHONE (OUTPATIENT)
Dept: INTERNAL MEDICINE CLINIC | Age: 51
End: 2021-09-21

## 2021-09-21 NOTE — TELEPHONE ENCOUNTER
Prior Authorization    Prior auth submitted via [email or web address]    Medication Name/ Dosage: Tretinoin 0.01% EX Gel     Quantity/Day Supply: 15 gram tube/30 day supply    Reference Number: PA Case ID: [id number]    Outcome: Approved; Effective 0921/[id number]    If Denied Reason for Denial: N/A    Notes/Comments:  [address] and patient notified of PA Approval.

## 2021-10-25 RX ORDER — TRETIONIN 0.1 MG/G
GEL TOPICAL
Qty: 15 G | Refills: 1 | Status: SHIPPED | OUTPATIENT
Start: 2021-10-25 | End: 2022-03-20

## 2021-10-27 LAB — SARS-COV-2, NAA: NEGATIVE

## 2021-11-01 ENCOUNTER — OFFICE VISIT (OUTPATIENT)
Dept: CARDIOLOGY CLINIC | Age: 51
End: 2021-11-01
Payer: COMMERCIAL

## 2021-11-01 VITALS
HEART RATE: 55 BPM | SYSTOLIC BLOOD PRESSURE: 142 MMHG | WEIGHT: 182.7 LBS | HEIGHT: 66 IN | RESPIRATION RATE: 18 BRPM | OXYGEN SATURATION: 98 % | BODY MASS INDEX: 29.36 KG/M2 | DIASTOLIC BLOOD PRESSURE: 82 MMHG

## 2021-11-01 DIAGNOSIS — I25.2 HISTORY OF ST ELEVATION MYOCARDIAL INFARCTION (STEMI): ICD-10-CM

## 2021-11-01 DIAGNOSIS — I10 ESSENTIAL HYPERTENSION: ICD-10-CM

## 2021-11-01 DIAGNOSIS — I25.10 CORONARY ARTERY DISEASE INVOLVING NATIVE CORONARY ARTERY OF NATIVE HEART WITHOUT ANGINA PECTORIS: Primary | ICD-10-CM

## 2021-11-01 DIAGNOSIS — E78.00 HYPERCHOLESTEREMIA: ICD-10-CM

## 2021-11-01 PROCEDURE — 99214 OFFICE O/P EST MOD 30 MIN: CPT | Performed by: INTERNAL MEDICINE

## 2021-11-01 PROCEDURE — 93000 ELECTROCARDIOGRAM COMPLETE: CPT | Performed by: INTERNAL MEDICINE

## 2021-11-01 NOTE — PROGRESS NOTES
Subjective/HPI: Ean Bingham is a 48 y.o. male is here for f/u appt. Last seen in our office 3/29/21. Hx of STEMI 7/15/20 with PCI/JAY to the distal RCA. He reports he is having random episodes of bessy lower ribcage tightening sensation. He stands up and it goes away. Happening every couple weeks. Always in the setting of sitting down. He denies SOB/EDEN, orthopnea, PND, or LE edema. He denies episodes of palpitations, syncope, presyncope or fatigue. PCP Provider  Jenn Cagle MD  Past Medical History:   Diagnosis Date    Bursitis of shoulder     right; \"comes and goes\"    CAD (coronary artery disease)     Dyslipidemia 7/15/2020    Essential hypertension 9/8/2017    GERD (gastroesophageal reflux disease)     Heart attack ([facility] [address]     Hypercholesteremia     Osteoarthritis       Past Surgical History:   Procedure Laterality Date    HX ACL RECONSTRUCTION Left     HX HERNIA REPAIR      HX LUMBAR LAMINECTOMY  2018    Dr. Rosa Maria Calderon; L4-5 level.  HX OTHER SURGICAL      INGROWN TOE NAIL REMOVED OFF L big toe     HX OTHER SURGICAL  03/2018    orthoscopy--R hip    HX REFRACTIVE SURGERY      HX SHOULDER ARTHROSCOPY  3/01/2012    Right shoulder     HX SHOULDER ARTHROSCOPY  04/2014    R shoulder     HX VASECTOMY       No Known Allergies   Family History   Problem Relation Age of Onset    Other Father         patient unsure, some sort of issues with RBCs of WBCs      Current Outpatient Medications   Medication Sig    tretinoin (Retin-A) 0.01 % topical gel Apply  to affected area nightly.  rosuvastatin (CRESTOR) 20 mg tablet Take one tablet by mouth every evening.  metoprolol tartrate (LOPRESSOR) 25 mg tablet Take 0.5 Tabs by mouth every twelve (12) hours.  losartan (COZAAR) 25 mg tablet Take 1 Tab by mouth daily.  aspirin delayed-release 81 mg tablet Take 1 Tab by mouth daily.     nitroglycerin (NITROSTAT) 0.4 mg SL tablet 1 Tab by SubLINGual route every five (5) minutes as needed for Chest Pain. Up to 3 doses.  MULTIVITAMINS (MULTIPLE VITAMINS PO) Take 1 Tab by mouth daily. One daily      No current facility-administered medications for this visit. Vitals:    11/01/21 1333 11/01/21 1340   BP: (!) 140/80 (!) 142/82   Pulse: (!) 55 (!) 55   Resp: 18    SpO2: 98%    Weight: 182 lb 11.2 oz (82.9 kg)    Height: 5' 6\" (1.676 m)      Social History     Socioeconomic History    Marital status:      Spouse name: Not on file    Number of children: Not on file    Years of education: Not on file    Highest education level: Not on file   Occupational History    Not on file   Tobacco Use    Smoking status: Never Smoker    Smokeless tobacco: Never Used   Vaping Use    Vaping Use: Never used   Substance and Sexual Activity    Alcohol use: No    Drug use: No    Sexual activity: Yes     Partners: Female   Other Topics Concern    Not on file   Social History Narrative    Works for [employer]. Social Determinants of Health     Financial Resource Strain:     Difficulty of Paying Living Expenses:    Food Insecurity:     Worried About Running Out of Food in the Last Year:     [address] in the Last Year:    Transportation Needs:     Lack of Transportation (Medical):      Lack of Transportation (Non-Medical):    Physical Activity:     Days of Exercise per Week:     Minutes of Exercise per Session:    Stress:     Feeling of Stress :    Social Connections:     Frequency of Communication with Friends and Family:     Frequency of Social Gatherings with Friends and Family:     Attends Muslim Services:     Active Member of Clubs or Organizations:     Attends Club or Organization Meetings:     Marital Status:    Intimate Partner Violence:     Fear of Current or Ex-Partner:     Emotionally Abused:     Physically Abused:     Sexually Abused:        I have reviewed the nurses notes, vitals, problem list, allergy list, medical history, family, social history and medications. Review of Symptoms:    General: Pt denies excessive weight gain or loss. Pt is able to conduct ADL's  HEENT: Denies blurred vision, headaches, epistaxis and difficulty swallowing. Respiratory: Denies shortness of breath, EDEN, wheezing or stridor. Cardiovascular: +chest tightness, denies palpitations, edema or PND  Gastrointestinal: Denies poor appetite, indigestion, abdominal pain or blood in stool  Urinary: Denies dysuria, pyuria  Musculoskeletal: +hip pain denies swelling from muscles or joints  Neurologic: Denies tremor, paresthesias, or sensory motor disturbance  Skin: Denies rash, itching or texture change. Psych: Denies depression        Physical Exam:      General: Well developed, in no acute distress, cooperative and alert  HEENT: No carotid bruits, no JVD, trach is midline. Neck Supple, PEERL, EOM intact. Heart:  Normal S1/S2 negative S3 or S4. Regular, no murmur, gallop or rub. Respiratory: Clear bilaterally x 4, no wheezing or rales  Abdomen:   Soft, non-tender, no masses, bowel sounds are active. Extremities:  No edema, normal cap refill, no cyanosis, atraumatic. Neuro: A&Ox3, speech clear, gait stable. Skin: Skin color is normal. No rashes or lesions. Non diaphoretic  Vascular: 2+ pulses symmetric in all extremities    Cardiographics    ECG: Sinus  Bradycardia HR 55  -Old inferior infarct.      Results for orders placed or performed during the hospital encounter of 07/15/20   EKG, 12 LEAD, INITIAL   Result Value Ref Range    Ventricular Rate 60 BPM    Atrial Rate 60 BPM    P-R Interval 170 ms    QRS Duration 88 ms    Q-T Interval 446 ms    QTC Calculation (Bezet) 446 ms    Calculated P Axis 45 degrees    Calculated R Axis -23 degrees    Calculated T Axis -34 degrees    Diagnosis       Normal sinus rhythm  Inferior infarct , age undetermined with evolutionary changes      Confirmed by Janina Cowden ([id number]) on 7/16/2020 9:10:03 AM           Cardiology Labs:  Lab Results Component Value Date/Time    Cholesterol, total 120 03/29/2021 10:05 AM    HDL Cholesterol 41 03/29/2021 10:05 AM    LDL, calculated 52 03/29/2021 10:05 AM    LDL, calculated 54 10/16/2020 08:30 AM    LDL, calculated 78.6 07/16/2020 06:00 AM    LDL, calculated 106.8 (H) 07/15/2020 05:32 AM    LDL, calculated 129 (H) 09/20/2019 09:26 AM    VLDL, calculated 27 03/29/2021 10:05 AM    VLDL, calculated 48.4 07/16/2020 06:00 AM    CHOL/HDL Ratio 3.8 07/16/2020 06:00 AM     Lab Results   Component Value Date/Time    Sodium 143 03/29/2021 10:05 AM    Potassium 4.5 03/29/2021 10:05 AM    Chloride 103 03/29/2021 10:05 AM    CO2 28 03/29/2021 10:05 AM    Glucose 82 03/29/2021 10:05 AM    BUN 11 03/29/2021 10:05 AM    Creatinine 1.19 03/29/2021 10:05 AM    BUN/Creatinine ratio 9 03/29/2021 10:05 AM    GFR est AA 82 03/29/2021 10:05 AM    GFR est non-AA 71 03/29/2021 10:05 AM    Calcium 10.0 03/29/2021 10:05 AM    Anion gap 6 07/15/2020 05:32 AM    Bilirubin, total 1.0 03/29/2021 10:05 AM    ALT (SGPT) 46 (H) 03/29/2021 10:05 AM    Alk. phosphatase 78 03/29/2021 10:05 AM    Protein, total 7.2 03/29/2021 10:05 AM    Albumin 4.6 03/29/2021 10:05 AM    Globulin 3.3 07/15/2020 02:46 AM    A-G Ratio 1.8 03/29/2021 10:05 AM     Lab Results   Component Value Date/Time    Hemoglobin A1c 5.5 11/20/2018 01:34 PM          Assessment:     Diagnoses and all orders for this visit:    1. Coronary artery disease involving native coronary artery of native heart without angina pectoris  -     AMB POC EKG ROUTINE W/ 12 LEADS, INTER & REP    2. Essential hypertension  -     AMB POC EKG ROUTINE W/ 12 LEADS, INTER & REP    3. Hypercholesteremia  -     AMB POC EKG ROUTINE W/ 12 LEADS, INTER & REP    4. History of ST elevation myocardial infarction (STEMI)  -     AMB POC EKG ROUTINE W/ 12 LEADS, INTER & REP        ICD-10-CM ICD-9-CM    1.  Coronary artery disease involving native coronary artery of native heart without angina pectoris  I25.10 414.01 AMB POC EKG ROUTINE W/ 12 LEADS, INTER & REP   2. Essential hypertension  I10 401.9 AMB POC EKG ROUTINE W/ 12 LEADS, INTER & REP   3. Hypercholesteremia  E78.00 272.0 AMB POC EKG ROUTINE W/ 12 LEADS, INTER & REP   4. History of ST elevation myocardial infarction (STEMI)  I25.2 412 AMB POC EKG ROUTINE W/ 12 LEADS, INTER & REP          Plan:     1. Coronary artery disease involving native coronary artery of native heart without angina pectoris  Hx of 7/15/20 STEMI with cardiac cath PCI/JAY to distal RCA. He had a staged cath on 7/16 to evaluate the Mid RCA and prox LAD 50% lesions. Both with neg FFR, felt to be insignificant. He is reporting atypical symptoms of nonexertional bessy lower ribcage tightening. EKG SB with old inferior infarct, without significant changes. He can stop Brilinta. Continue ASA, Metoprolol tartrate 12.5mg bid, and Crestor 20mg. Discussed if his symptoms worsen or become exertional he should call office to discuss. If unresolved go to ED emergently. 2. Essential hypertension  Well controlled on Metoprolol tartrate 12.5mg bid, Losartan 25mg every day. 3. Hypercholesteremia  On Crestor 20mg. 7/16/20 LDL 78. 10/16/20 LDL 54. LDL 52 3/29/21. Cont Crestor 20mg daily. Patient seen and examined by me with nurse practitioner. Blayne Duran personally performed all components of the history, physical, and medical decision making and agree with the assessment and plan with minor modifications as noted. Atypical pain in ribcage at rest; relieved by standing. Exerts without problems. Don't think this represents angina. OK to stop low dose brilinata at the end of 2021. Lipids at target. EKG fine.       Saritha Bob MD

## 2021-11-01 NOTE — LETTER
11/1/2021    Patient: Piyush Childers   YOB: 1970   Date of Visit: 11/1/2021     Lexus Garces MD  [address]  [address] [address]  [place] [employer]    Dear Lexus Garces MD,      Thank you for referring Mr. Piyush Childers to [address] [place] [address] for evaluation. My notes for this consultation are attached. If you have questions, please do not hesitate to call me. I look forward to following your patient along with you.       Sincerely,    Julio Buerger, MD

## 2021-11-01 NOTE — PROGRESS NOTES
Chief Complaint   Patient presents with    Coronary Artery Disease     6 Month Follow Up; Denies Cardiac Symptoms    Hyperlipidemia    Hypertension     Vitals:    11/01/21 1333 11/01/21 1340   BP: (!) 140/80 (!) 142/82   BP 1 Location: Left arm Right arm   BP Patient Position: Sitting Sitting   BP Cuff Size: Adult Adult   Pulse: (!) 55 (!) 55   Resp: 18    Height: 5' 6\" (1.676 m)    Weight: 182 lb 11.2 oz (82.9 kg)    SpO2: 98%          1. Have you been to the ER, urgent care clinic since your last visit? Hospitalized since your last visit? No    2. Have you seen or consulted any other health care providers outside of the [address] [place] [address] since your last visit? Include any pap smears or colon screening.  No

## 2021-11-22 ENCOUNTER — OFFICE VISIT (OUTPATIENT)
Dept: INTERNAL MEDICINE CLINIC | Age: 51
End: 2021-11-22
Payer: COMMERCIAL

## 2021-11-22 VITALS
HEIGHT: 66 IN | DIASTOLIC BLOOD PRESSURE: 79 MMHG | SYSTOLIC BLOOD PRESSURE: 133 MMHG | HEART RATE: 50 BPM | WEIGHT: 179 LBS | RESPIRATION RATE: 16 BRPM | OXYGEN SATURATION: 98 % | TEMPERATURE: 97.2 F | BODY MASS INDEX: 28.77 KG/M2

## 2021-11-22 DIAGNOSIS — Z00.00 ROUTINE GENERAL MEDICAL EXAMINATION AT A HEALTH CARE FACILITY: Primary | ICD-10-CM

## 2021-11-22 PROCEDURE — 99396 PREV VISIT EST AGE 40-64: CPT | Performed by: INTERNAL MEDICINE

## 2021-11-22 RX ORDER — METOPROLOL TARTRATE 25 MG/1
TABLET, FILM COATED ORAL
Qty: 90 TABLET | Refills: 1 | Status: SHIPPED | OUTPATIENT
Start: 2021-11-22

## 2021-11-22 NOTE — PATIENT INSTRUCTIONS
Well Visit, Men 48 to 72: Care Instructions  Overview     Well visits can help you stay healthy. Your doctor has checked your overall health and may have suggested ways to take good care of yourself. Your doctor also may have recommended tests. At home, you can help prevent illness with healthy eating, regular exercise, and other steps. Follow-up care is a key part of your treatment and safety. Be sure to make and go to all appointments, and call your doctor if you are having problems. It's also a good idea to know your test results and keep a list of the medicines you take. How can you care for yourself at home? · Get screening tests that you and your doctor decide on. Screening helps find diseases before any symptoms appear. · Eat healthy foods. Choose fruits, vegetables, whole grains, protein, and low-fat dairy foods. Limit fat, especially saturated fat. Reduce salt in your diet. · Limit alcohol. Have no more than 2 drinks a day or 14 drinks a week. · Get at least 30 minutes of exercise on most days of the week. Walking is a good choice. You also may want to do other activities, such as running, swimming, cycling, or playing tennis or team sports. · Reach and stay at a healthy weight. This will lower your risk for many problems, such as obesity, diabetes, heart disease, and high blood pressure. · Do not smoke. Smoking can make health problems worse. If you need help quitting, talk to your doctor about stop-smoking programs and medicines. These can increase your chances of quitting for good. · Care for your mental health. It is easy to get weighed down by worry and stress. Learn strategies to manage stress, like deep breathing and mindfulness, and stay connected with your family and community. If you find you often feel sad or hopeless, talk with your doctor. Treatment can help. · Talk to your doctor about whether you have any risk factors for sexually transmitted infections (STIs).  You can help prevent STIs if you wait to have sex with a new partner (or partners) until you've each been tested for STIs. It also helps if you use condoms (male or female condoms) and if you limit your sex partners to one person who only has sex with you. Vaccines are available for some STIs. · If it's important to you to prevent pregnancy with your partner, talk with your doctor about birth control options that might be best for you. · If you think you may have a problem with alcohol or drug use, talk to your doctor. This includes prescription medicines (such as amphetamines and opioids) and illegal drugs (such as cocaine and methamphetamine). Your doctor can help you figure out what type of treatment is best for you. · Protect your skin from too much sun. When you're outdoors from 10 a.m. to 4 p.m., stay in the shade or cover up with clothing and a hat with a wide brim. Wear sunglasses that block UV rays. Even when it's cloudy, put broad-spectrum sunscreen (SPF 30 or higher) on any exposed skin. · See a dentist one or two times a year for checkups and to have your teeth cleaned. · Wear a seat belt in the car. When should you call for help? Watch closely for changes in your health, and be sure to contact your doctor if you have any problems or symptoms that concern you. Where can you learn more? Go to http://www.gray.com/  Enter P227 in the search box to learn more about \"Well Visit, Men 48 to 72: Care Instructions. \"  Current as of: February 11, 2021               Content Version: 13.0  © 2597-2950 [employer]. Care instructions adapted under license by [employer] (which disclaims liability or warranty for this information). If you have questions about a medical condition or this instruction, always ask your healthcare professional. [address] any warranty or liability for your use of this information.

## 2021-11-22 NOTE — PROGRESS NOTES
Ely Otto is a 48 y.o. male  Chief Complaint   Patient presents with    Complete Physical     Health Maintenance Due   Topic Date Due    Hepatitis C Screening  Never done    COVID-19 Vaccine (2 - Moderna 2-dose series) 04/20/2021     Visit Vitals  /79   Pulse (!) 50   Temp 97.2 °F (36.2 °C) (Temporal)   Resp 16   Ht 5' 6\" (1.676 m)   Wt 179 lb (81.2 kg)   SpO2 98%   BMI 28.89 kg/m²

## 2021-11-22 NOTE — PROGRESS NOTES
SUBJECTIVE:   Mr. Gerardo Sosa is a 48 y.o. male who is here for follow up of routine medical issues. Chief Complaint   Patient presents with    Complete Physical       He had L hip replacement in Sept 2021. L knee stable. He sees Dr. Rubén Kilgore for CAD. It is recalled he had MI in July 2020, with Cath / stent distal RCA; cath also showed 50% lesions in mid RCA and prox LAD. Back is fine. He had spinal fusion Dr. Mirta Diaz in November 2018. At this time, he is otherwise doing well and has brought no other complaints to my attention today. For a list of the medical issues addressed today, see the assessment and plan below. PMH:   Past Medical History:   Diagnosis Date    Bursitis of shoulder     right; \"comes and goes\"    CAD (coronary artery disease)     Dyslipidemia 7/15/2020    Essential hypertension 9/8/2017    GERD (gastroesophageal reflux disease)     Heart attack ([facility] [address]     Hypercholesteremia     Osteoarthritis        PSH:   Past Surgical History:   Procedure Laterality Date    HX ACL RECONSTRUCTION Left     HX HERNIA REPAIR      HX LUMBAR LAMINECTOMY  2018    Dr. Kirby Case; L4-5 level.  HX OTHER SURGICAL      INGROWN TOE NAIL REMOVED OFF L big toe     HX OTHER SURGICAL  03/2018    orthoscopy--R hip    HX REFRACTIVE SURGERY      HX SHOULDER ARTHROSCOPY  3/01/2012    Right shoulder     HX SHOULDER ARTHROSCOPY  04/2014    R shoulder     HX VASECTOMY         All: He has No Known Allergies. MEDS:   Current Outpatient Medications   Medication Sig    metoprolol tartrate (LOPRESSOR) 25 mg tablet TAKE 1/2 TABLET EVERY 12   HOURS    tretinoin (Retin-A) 0.01 % topical gel Apply  to affected area nightly.  rosuvastatin (CRESTOR) 20 mg tablet Take one tablet by mouth every evening.  losartan (COZAAR) 25 mg tablet Take 1 Tab by mouth daily.  aspirin delayed-release 81 mg tablet Take 1 Tab by mouth daily.     nitroglycerin (NITROSTAT) 0.4 mg SL tablet 1 Tab by SubLINGual route every five (5) minutes as needed for Chest Pain. Up to 3 doses.  MULTIVITAMINS (MULTIPLE VITAMINS PO) Take 1 Tab by mouth daily. One daily      No current facility-administered medications for this visit. FH: Father has a hematologic disorder. Mother has sciatica. Two sisters alive and well. SH: . Lifts weight. He reports that he has never smoked. He has never used smokeless tobacco. He reports that he does not drink alcohol and does not use drugs. ROS: See above; Complete ROS otherwise negative. OBJECTIVE:   Vitals:   Visit Vitals  /79   Pulse (!) 50   Temp 97.2 °F (36.2 °C) (Temporal)   Resp 16   Ht 5' 6\" (1.676 m)   Wt 179 lb (81.2 kg)   SpO2 98%   BMI 28.89 kg/m²      Gen: Pleasant 48 y.o.  male in NAD. HEENT: PERRLA. EOMI. OP moist and pink. Neck: Supple. No LAD. HEART: RRR, No M/G/R.    LUNGS: CTAB No W/R. ABDOMEN: S, NT, ND, BS+. EXTREMITIES: Warm. No C/C/E.  MUSCULOSKELETAL: Normal ROM, muscle strength 5/5 all groups. L knee crepitus with flexion and extension. NEURO: Alert and oriented x 3. Cranial nerves grossly intact. No focal sensory or motor deficits noted. SKIN: Warm. Dry. No rashes or other lesions noted. ASSESSMENT/ PLAN:   CPE: Normal exam.     1. Hypertension: BP fine; previously controlled. 2. Hypercholesterolemia: Due for recheck. - METABOLIC PANEL, COMPREHENSIVE  - LIPID PANEL  3. GERD (gastroesophageal reflux disease): Currently quiescent. Off meds. 4. CAD: Sees Dr. Lizzy Luke. 5. Weight: Diet and exercise. I have reviewed the patient's medications and risks/side effects/benefits were discussed. Diagnosis(-es) explained to patient and questions answered. Literature provided where appropriate. Discussed the patient's BMI with him.   The BMI follow up plan is as follows:     dietary management education, guidance, and counseling  encourage exercise  monitor weight  prescribed dietary intake    An After Visit Summary was printed and given to the patient.

## 2021-11-23 ENCOUNTER — APPOINTMENT (OUTPATIENT)
Dept: INTERNAL MEDICINE CLINIC | Age: 51
End: 2021-11-23

## 2021-11-23 LAB
ALBUMIN SERPL-MCNC: 4.2 G/DL (ref 3.5–5)
ALBUMIN/GLOB SERPL: 1.4 {RATIO} (ref 1.1–2.2)
ALP SERPL-CCNC: 82 U/L (ref 45–117)
ALT SERPL-CCNC: 65 U/L (ref 12–78)
ANION GAP SERPL CALC-SCNC: 6 MMOL/L (ref 5–15)
AST SERPL-CCNC: 30 U/L (ref 15–37)
BASOPHILS # BLD: 0 K/UL (ref 0–0.1)
BASOPHILS NFR BLD: 1 % (ref 0–1)
BILIRUB SERPL-MCNC: 0.9 MG/DL (ref 0.2–1)
BUN SERPL-MCNC: 15 MG/DL (ref 6–20)
BUN/CREAT SERPL: 13 (ref 12–20)
CALCIUM SERPL-MCNC: 9.6 MG/DL (ref 8.5–10.1)
CHLORIDE SERPL-SCNC: 107 MMOL/L (ref 97–108)
CHOLEST SERPL-MCNC: 140 MG/DL
CO2 SERPL-SCNC: 27 MMOL/L (ref 21–32)
CREAT SERPL-MCNC: 1.13 MG/DL (ref 0.7–1.3)
DIFFERENTIAL METHOD BLD: NORMAL
EOSINOPHIL # BLD: 0.3 K/UL (ref 0–0.4)
EOSINOPHIL NFR BLD: 6 % (ref 0–7)
ERYTHROCYTE [DISTWIDTH] IN BLOOD BY AUTOMATED COUNT: 11.9 % (ref 11.5–14.5)
EST. AVERAGE GLUCOSE BLD GHB EST-MCNC: 103 MG/DL
GLOBULIN SER CALC-MCNC: 3.1 G/DL (ref 2–4)
GLUCOSE SERPL-MCNC: 86 MG/DL (ref 65–100)
HBA1C MFR BLD: 5.2 % (ref 4–5.6)
HCT VFR BLD AUTO: 47 % (ref 36.6–50.3)
HCV AB SERPL QL IA: NONREACTIVE
HDLC SERPL-MCNC: 58 MG/DL
HDLC SERPL: 2.4 {RATIO} (ref 0–5)
HGB BLD-MCNC: 15.2 G/DL (ref 12.1–17)
IMM GRANULOCYTES # BLD AUTO: 0 K/UL (ref 0–0.04)
IMM GRANULOCYTES NFR BLD AUTO: 0 % (ref 0–0.5)
LDLC SERPL CALC-MCNC: 56.6 MG/DL (ref 0–100)
LYMPHOCYTES # BLD: 1.5 K/UL (ref 0.8–3.5)
LYMPHOCYTES NFR BLD: 30 % (ref 12–49)
MCH RBC QN AUTO: 30.4 PG (ref 26–34)
MCHC RBC AUTO-ENTMCNC: 32.3 G/DL (ref 30–36.5)
MCV RBC AUTO: 94 FL (ref 80–99)
MONOCYTES # BLD: 0.5 K/UL (ref 0–1)
MONOCYTES NFR BLD: 10 % (ref 5–13)
NEUTS SEG # BLD: 2.6 K/UL (ref 1.8–8)
NEUTS SEG NFR BLD: 53 % (ref 32–75)
NRBC # BLD: 0 K/UL (ref 0–0.01)
NRBC BLD-RTO: 0 PER 100 WBC
PLATELET # BLD AUTO: 254 K/UL (ref 150–400)
PMV BLD AUTO: 10.5 FL (ref 8.9–12.9)
POTASSIUM SERPL-SCNC: 4.1 MMOL/L (ref 3.5–5.1)
PROT SERPL-MCNC: 7.3 G/DL (ref 6.4–8.2)
RBC # BLD AUTO: 5 M/UL (ref 4.1–5.7)
SODIUM SERPL-SCNC: 140 MMOL/L (ref 136–145)
TRIGL SERPL-MCNC: 127 MG/DL (ref ?–150)
VLDLC SERPL CALC-MCNC: 25.4 MG/DL
WBC # BLD AUTO: 4.9 K/UL (ref 4.1–11.1)

## 2022-01-03 RX ORDER — LOSARTAN POTASSIUM 25 MG/1
25 TABLET ORAL DAILY
Qty: 90 TABLET | Refills: 1 | Status: SHIPPED | OUTPATIENT
Start: 2022-01-03

## 2022-03-18 PROBLEM — M43.16 SPONDYLOLISTHESIS OF LUMBAR REGION: Status: ACTIVE | Noted: 2018-11-28

## 2022-03-18 PROBLEM — E78.5 DYSLIPIDEMIA: Status: ACTIVE | Noted: 2020-07-15

## 2022-03-18 PROBLEM — I21.19 ACUTE ST ELEVATION MYOCARDIAL INFARCTION (STEMI) OF INFERIOR WALL (HCC): Status: ACTIVE | Noted: 2020-07-15

## 2022-03-19 PROBLEM — Z98.1 S/P LUMBAR FUSION: Status: ACTIVE | Noted: 2018-11-28

## 2022-03-19 PROBLEM — I21.3 STEMI (ST ELEVATION MYOCARDIAL INFARCTION) (HCC): Status: ACTIVE | Noted: 2020-07-15

## 2022-03-19 PROBLEM — I21.11 STEMI INVOLVING RIGHT CORONARY ARTERY (HCC): Status: ACTIVE | Noted: 2020-07-15

## 2022-03-19 PROBLEM — I10 ESSENTIAL HYPERTENSION: Status: ACTIVE | Noted: 2017-09-08

## 2022-03-20 RX ORDER — TRETIONIN 0.1 MG/G
GEL TOPICAL
Qty: 15 G | Refills: 1 | Status: SHIPPED | OUTPATIENT
Start: 2022-03-20 | End: 2022-08-02

## 2022-06-14 RX ORDER — METOPROLOL TARTRATE 25 MG/1
TABLET, FILM COATED ORAL
Qty: 90 TABLET | Refills: 1 | OUTPATIENT
Start: 2022-06-14

## 2022-08-02 RX ORDER — TRETIONIN 0.1 MG/G
GEL TOPICAL
Qty: 15 G | Refills: 1 | Status: SHIPPED | OUTPATIENT
Start: 2022-08-02

## 2022-11-18 RX ORDER — TRETIONIN 0.1 MG/G
GEL TOPICAL
Qty: 15 G | Refills: 1 | Status: SHIPPED | OUTPATIENT
Start: 2022-11-18

## 2022-12-05 ENCOUNTER — OFFICE VISIT (OUTPATIENT)
Dept: INTERNAL MEDICINE CLINIC | Age: 52
End: 2022-12-05
Payer: COMMERCIAL

## 2022-12-05 VITALS
BODY MASS INDEX: 29.89 KG/M2 | WEIGHT: 186 LBS | TEMPERATURE: 97.9 F | DIASTOLIC BLOOD PRESSURE: 84 MMHG | HEART RATE: 72 BPM | HEIGHT: 66 IN | RESPIRATION RATE: 16 BRPM | OXYGEN SATURATION: 96 % | SYSTOLIC BLOOD PRESSURE: 129 MMHG

## 2022-12-05 DIAGNOSIS — Z00.00 ROUTINE GENERAL MEDICAL EXAMINATION AT A HEALTH CARE FACILITY: Primary | ICD-10-CM

## 2022-12-05 PROCEDURE — 99396 PREV VISIT EST AGE 40-64: CPT | Performed by: INTERNAL MEDICINE

## 2022-12-05 NOTE — PROGRESS NOTES
SUBJECTIVE:   Mr. Treva Lara is a 46 y.o. male who is here for follow up of routine medical issues. Chief Complaint   Patient presents with    Physical       He is about to get a R shoulder replacement. He had R hip replacement Sept 2022, by Dr. Kris Hu. He is doing well, just finished PT. He had L hip replacement in Sept 2021. L knee stable. He sees Dr. Alhaji Yuen for CAD. It is recalled he had MI in July 2020, with Cath / stent distal RCA; cath also showed 50% lesions in mid RCA and prox LAD. Back is fine. He had spinal fusion Dr. Cherylene Limes in November 2018. At this time, he is otherwise doing well and has brought no other complaints to my attention today. For a list of the medical issues addressed today, see the assessment and plan below. PMH:   Past Medical History:   Diagnosis Date    Bursitis of shoulder     right; \"comes and goes\"    CAD (coronary artery disease)     Dyslipidemia 7/15/2020    Essential hypertension 9/8/2017    GERD (gastroesophageal reflux disease)     Heart attack ([address]     Hypercholesteremia     Osteoarthritis        PSH:   Past Surgical History:   Procedure Laterality Date    HX ACL RECONSTRUCTION Left     HX HERNIA REPAIR      HX LUMBAR LAMINECTOMY  2018    Dr. Cherylene Limes; L4-5 level. HX OTHER SURGICAL      INGROWN TOE NAIL REMOVED OFF L big toe     HX OTHER SURGICAL  03/2018    orthoscopy--R hip    HX REFRACTIVE SURGERY      HX SHOULDER ARTHROSCOPY  3/01/2012    Right shoulder     HX SHOULDER ARTHROSCOPY  04/2014    R shoulder     HX VASECTOMY         All: He has No Known Allergies. MEDS:   Current Outpatient Medications   Medication Sig    tretinoin (RETIN-A) 0.01 % topical gel APPLY TO AFFECTED AREA NIGHTLY    losartan (COZAAR) 25 mg tablet Take 1 Tablet by mouth daily. metoprolol tartrate (LOPRESSOR) 25 mg tablet TAKE 1/2 TABLET EVERY 12   HOURS    rosuvastatin (CRESTOR) 20 mg tablet Take one tablet by mouth every evening.     aspirin delayed-release 81 mg tablet Take 1 Tab by mouth daily. nitroglycerin (NITROSTAT) 0.4 mg SL tablet 1 Tab by SubLINGual route every five (5) minutes as needed for Chest Pain. Up to 3 doses. MULTIVITAMINS (MULTIPLE VITAMINS PO) Take 1 Tab by mouth daily. One daily      No current facility-administered medications for this visit. FH: Father has a hematologic disorder. Mother has sciatica. Two sisters alive and well. SH: . Lifts weight. He reports that he has never smoked. He has never used smokeless tobacco. He reports that he does not drink alcohol and does not use drugs. ROS: See above; Complete ROS otherwise negative. OBJECTIVE:   Vitals:   Visit Vitals  /84 (BP 1 Location: Right upper arm, BP Patient Position: Sitting, BP Cuff Size: Adult)   Pulse 72   Temp 97.9 °F (36.6 °C) (Temporal)   Resp 16   Ht 5' 6\" (1.676 m)   Wt 186 lb (84.4 kg)   SpO2 96%   BMI 30.02 kg/m²      Gen: Pleasant 46 y.o.  male in NAD. HEENT: PERRLA. EOMI. OP moist and pink. Neck: Supple. No LAD. HEART: RRR, No M/G/R.    LUNGS: CTAB No W/R. ABDOMEN: S, NT, ND, BS+. He appears to have a mild midline ventral abdominal wall hernia. EXTREMITIES: Warm. No C/C/E.  MUSCULOSKELETAL: Normal ROM, muscle strength 5/5 all groups. L knee crepitus with flexion and extension. NEURO: Alert and oriented x 3. Cranial nerves grossly intact. No focal sensory or motor deficits noted. SKIN: Warm. Dry. No rashes or other lesions noted. ASSESSMENT/ PLAN:   CPE: Normal exam.     Hypertension: BP fine; previously controlled. Hypercholesterolemia: Due for recheck. - METABOLIC PANEL, COMPREHENSIVE  - LIPID PANEL  GERD (gastroesophageal reflux disease): Currently quiescent. Off meds. CAD: Sees Dr. Miky Ang. Weight: Diet and exercise. I have reviewed the patient's medications and risks/side effects/benefits were discussed. Diagnosis(-es) explained to patient and questions answered.  Literature provided where appropriate. Follow-up and Dispositions    Return in about 1 year (around 12/5/2023) for CPE.

## 2022-12-05 NOTE — PROGRESS NOTES
1. \"Have you been to the ER, urgent care clinic since your last visit? Hospitalized since your last visit? \" No    2. \"Have you seen or consulted any other health care providers outside of the [address] [place] [address] since your last visit? \" No     3. For patients aged 39-70: Has the patient had a colonoscopy / FIT/ Cologuard?  Yes - no Care Gap present

## 2022-12-06 ENCOUNTER — APPOINTMENT (OUTPATIENT)
Dept: INTERNAL MEDICINE CLINIC | Age: 52
End: 2022-12-06

## 2022-12-06 DIAGNOSIS — Z00.00 ROUTINE GENERAL MEDICAL EXAMINATION AT A HEALTH CARE FACILITY: ICD-10-CM

## 2022-12-06 LAB
ALBUMIN SERPL-MCNC: 4 G/DL (ref 3.5–5)
ALBUMIN/GLOB SERPL: 1.3 {RATIO} (ref 1.1–2.2)
ALP SERPL-CCNC: 80 U/L (ref 45–117)
ALT SERPL-CCNC: 37 U/L (ref 12–78)
ANION GAP SERPL CALC-SCNC: 3 MMOL/L (ref 5–15)
AST SERPL-CCNC: 16 U/L (ref 15–37)
BASOPHILS # BLD: 0.1 K/UL (ref 0–0.1)
BASOPHILS NFR BLD: 1 % (ref 0–1)
BILIRUB SERPL-MCNC: 0.6 MG/DL (ref 0.2–1)
BUN SERPL-MCNC: 17 MG/DL (ref 6–20)
BUN/CREAT SERPL: 16 (ref 12–20)
CALCIUM SERPL-MCNC: 9.1 MG/DL (ref 8.5–10.1)
CHLORIDE SERPL-SCNC: 111 MMOL/L (ref 97–108)
CHOLEST SERPL-MCNC: 128 MG/DL
CO2 SERPL-SCNC: 29 MMOL/L (ref 21–32)
CREAT SERPL-MCNC: 1.05 MG/DL (ref 0.7–1.3)
DIFFERENTIAL METHOD BLD: NORMAL
EOSINOPHIL # BLD: 0.2 K/UL (ref 0–0.4)
EOSINOPHIL NFR BLD: 6 % (ref 0–7)
ERYTHROCYTE [DISTWIDTH] IN BLOOD BY AUTOMATED COUNT: 12 % (ref 11.5–14.5)
EST. AVERAGE GLUCOSE BLD GHB EST-MCNC: 105 MG/DL
GLOBULIN SER CALC-MCNC: 3 G/DL (ref 2–4)
GLUCOSE SERPL-MCNC: 97 MG/DL (ref 65–100)
HBA1C MFR BLD: 5.3 % (ref 4–5.6)
HCT VFR BLD AUTO: 42.9 % (ref 36.6–50.3)
HDLC SERPL-MCNC: 50 MG/DL
HDLC SERPL: 2.6 {RATIO} (ref 0–5)
HGB BLD-MCNC: 14.2 G/DL (ref 12.1–17)
IMM GRANULOCYTES # BLD AUTO: 0 K/UL (ref 0–0.04)
IMM GRANULOCYTES NFR BLD AUTO: 0 % (ref 0–0.5)
LDLC SERPL CALC-MCNC: 57.4 MG/DL (ref 0–100)
LYMPHOCYTES # BLD: 1.4 K/UL (ref 0.8–3.5)
LYMPHOCYTES NFR BLD: 33 % (ref 12–49)
MCH RBC QN AUTO: 30.5 PG (ref 26–34)
MCHC RBC AUTO-ENTMCNC: 33.1 G/DL (ref 30–36.5)
MCV RBC AUTO: 92.3 FL (ref 80–99)
MONOCYTES # BLD: 0.3 K/UL (ref 0–1)
MONOCYTES NFR BLD: 8 % (ref 5–13)
NEUTS SEG # BLD: 2.2 K/UL (ref 1.8–8)
NEUTS SEG NFR BLD: 52 % (ref 32–75)
NRBC # BLD: 0 K/UL (ref 0–0.01)
NRBC BLD-RTO: 0 PER 100 WBC
PLATELET # BLD AUTO: 251 K/UL (ref 150–400)
PMV BLD AUTO: 10.4 FL (ref 8.9–12.9)
POTASSIUM SERPL-SCNC: 3.9 MMOL/L (ref 3.5–5.1)
PROT SERPL-MCNC: 7 G/DL (ref 6.4–8.2)
RBC # BLD AUTO: 4.65 M/UL (ref 4.1–5.7)
SODIUM SERPL-SCNC: 143 MMOL/L (ref 136–145)
TRIGL SERPL-MCNC: 103 MG/DL (ref ?–150)
VLDLC SERPL CALC-MCNC: 20.6 MG/DL
WBC # BLD AUTO: 4.2 K/UL (ref 4.1–11.1)

## 2022-12-08 ENCOUNTER — TELEPHONE (OUTPATIENT)
Dept: INTERNAL MEDICINE CLINIC | Age: 52
End: 2022-12-08

## 2022-12-08 LAB
PSA SERPL-MCNC: 2.1 NG/ML (ref 0–4)
REFLEX CRITERIA: NORMAL

## 2022-12-08 NOTE — TELEPHONE ENCOUNTER
Spoke w/ pt, informed pt that we can schedule a NV for EKG. Pt stated they will see their cardiologist on 12/15. Pt will get EKG done with them. No need for scheduling.

## 2022-12-08 NOTE — TELEPHONE ENCOUNTER
----- Message from Jerrell Bai sent at 12/8/2022 10:34 AM EST -----  Subject: Message to Provider    QUESTIONS  Information for Provider? Sammi/ Dr Loki Horton needs a copy of the last   office visit the pt had. Please fax to [phone]  ---------------------------------------------------------------------------  --------------  CALL BACK INFO  [phone]; OK to leave message on voicemail  ---------------------------------------------------------------------------  --------------  SCRIPT ANSWERS  Relationship to Patient? Third Party  Third Party Type? Physician Office? Representative Name?  Sammi/ Dr Loki Horton

## 2022-12-08 NOTE — TELEPHONE ENCOUNTER
----- Message from Ector Reyes sent at 12/8/2022  9:14 AM EST -----  Subject: Appointment Request    Reason for Call: Established Patient Appointment needed: Routine Pre-Op    QUESTIONS    Reason for appointment request? No appointments available during search     Additional Information for Provider?  Pre-op surgery is on Dec 30 for right   shoulder no time avail until day before  at the [facility] EKG is needed [phone] this is to Dr. Jose Morris office   ---------------------------------------------------------------------------  --------------  [address]  [phone]; OK to leave message on voicemail  ---------------------------------------------------------------------------  --------------  SCRIPT ANSWERS  JESSIE Screen: Luis Antonio Regan

## 2022-12-21 ENCOUNTER — OFFICE VISIT (OUTPATIENT)
Dept: SURGERY | Age: 52
End: 2022-12-21
Payer: COMMERCIAL

## 2022-12-21 VITALS
BODY MASS INDEX: 29.73 KG/M2 | HEIGHT: 66 IN | DIASTOLIC BLOOD PRESSURE: 86 MMHG | SYSTOLIC BLOOD PRESSURE: 131 MMHG | HEART RATE: 62 BPM | OXYGEN SATURATION: 96 % | WEIGHT: 185 LBS | TEMPERATURE: 97.3 F | RESPIRATION RATE: 20 BRPM

## 2022-12-21 DIAGNOSIS — M62.08 DIASTASIS RECTI: Primary | ICD-10-CM

## 2022-12-21 PROCEDURE — 3078F DIAST BP <80 MM HG: CPT | Performed by: SURGERY

## 2022-12-21 PROCEDURE — 99203 OFFICE O/P NEW LOW 30 MIN: CPT | Performed by: SURGERY

## 2022-12-21 PROCEDURE — 3074F SYST BP LT 130 MM HG: CPT | Performed by: SURGERY

## 2022-12-21 RX ORDER — LOSARTAN POTASSIUM 50 MG/1
50 TABLET ORAL DAILY
COMMUNITY
Start: 2022-12-13

## 2022-12-21 NOTE — PROGRESS NOTES
Identified pt with two pt identifiers(name and ). Reviewed record in preparation for visit and have obtained necessary documentation. All patient medications has been reviewed. Chief Complaint   Patient presents with    Possible Hernia     Seen at the request of Dr. Tanisha Evangelista, eval abdominal wall hernia. Health Maintenance Due   Topic    Depression Screen        Vitals:    22 0944   BP: 131/86   Pulse: 62   Resp: 20   Temp: 97.3 °F (36.3 °C)   TempSrc: Temporal   SpO2: 96%   Weight: 83.9 kg (185 lb)   Height: 5' 6\" (1.676 m)   PainSc:   0 - No pain       4. Have you been to the ER, urgent care clinic since your last visit? Hospitalized since your last visit? No    5. Have you seen or consulted any other health care providers outside of the [address] [place] [address] since your last visit? Include any pap smears or colon screening. No      Patient is accompanied by self I have received verbal consent from Alex Girjalva to discuss any/all medical information while they are present in the room.

## 2022-12-21 NOTE — LETTER
12/21/2022    Patient: Sulaiman Arenas   YOB: 1970   Date of Visit: 12/21/2022     Trisha Isaac MD  [address]  [facility] [address]  [address] [address]  [place] [facility] [address]    Dear Trisha Isaac MD,      Thank you for referring Mr. Sulaiman Arenas to  [facility] for evaluation. My notes for this consultation are attached. If you have questions, please do not hesitate to call me. I look forward to following your patient along with you.       Sincerely,    Gabriel Frias MD

## 2022-12-21 NOTE — PROGRESS NOTES
Surgery History and Physical    Subjective:      Treva Lara is a 46 y.o. male who presents for evaluation of ventral hernia. He had his annual physical with his PCP - and he was told he had an abdominal wall hernia. He is unsure how long he had it. Its not causing any pain or discomfort. Tolerating a diet and having bowel function. Cardiologist is Alhaji Yuen - on ASA - Last MI July 15th, 2020. He has had surgeries since then - left hip replacement and right hip replacement. He is scheduled to get shoulder replacement at the end of Dec.       Past Medical History:   Diagnosis Date    Bursitis of shoulder     right; \"comes and goes\"    CAD (coronary artery disease)     Dyslipidemia 7/15/2020    Essential hypertension 9/8/2017    GERD (gastroesophageal reflux disease)     Heart attack ([address]     Hypercholesteremia     Osteoarthritis      Past Surgical History:   Procedure Laterality Date    HX ACL RECONSTRUCTION Left     HX HERNIA REPAIR      HX LUMBAR LAMINECTOMY  2018    Dr. Cherylene Limes; L4-5 level. HX OTHER SURGICAL      INGROWN TOE NAIL REMOVED OFF L big toe     HX OTHER SURGICAL  03/2018    orthoscopy--R hip    HX REFRACTIVE SURGERY      HX SHOULDER ARTHROSCOPY  3/01/2012    Right shoulder     HX SHOULDER ARTHROSCOPY  04/2014    R shoulder     HX VASECTOMY        Family History   Problem Relation Age of Onset    Other Father         patient unsure, some sort of issues with RBCs of WBCs     Social History     Tobacco Use    Smoking status: Never    Smokeless tobacco: Never   Substance Use Topics    Alcohol use: No      Prior to Admission medications    Medication Sig Start Date End Date Taking? Authorizing Provider   losartan (COZAAR) 50 mg tablet Take 50 mg by mouth daily. 12/13/22   Provider, Historical   tretinoin (RETIN-A) 0.01 % topical gel APPLY TO AFFECTED AREA NIGHTLY 11/18/22   Cris Godoy MD   losartan (COZAAR) 25 mg tablet Take 1 Tablet by mouth daily.  1/3/22   Elizabeth Underwood MD   metoprolol tartrate (LOPRESSOR) 25 mg tablet TAKE 1/2 TABLET EVERY 12   HOURS 11/22/21   Irina VALDEZ NP   rosuvastatin (CRESTOR) 20 mg tablet Take one tablet by mouth every evening. 7/2/21   Irina Clara VALDEZ NP   aspirin delayed-release 81 mg tablet Take 1 Tab by mouth daily. 7/16/20   Rosejose Lin NP   nitroglycerin (NITROSTAT) 0.4 mg SL tablet 1 Tab by SubLINGual route every five (5) minutes as needed for Chest Pain. Up to 3 doses. 7/16/20   Rosezelpura Lin NP   MULTIVITAMINS (MULTIPLE VITAMINS PO) Take 1 Tab by mouth daily. One daily     Provider, Historical      No Known Allergies    Review of Systems:  A comprehensive review of systems was negative except for that written in the History of Present Illness. Objective:     Visit Vitals  /86 (BP 1 Location: Left upper arm, BP Patient Position: Sitting, BP Cuff Size: Adult)   Pulse 62   Temp 97.3 °F (36.3 °C) (Temporal)   Resp 20   Ht 5' 6\" (1.676 m)   Wt 83.9 kg (185 lb)   SpO2 96%   BMI 29.86 kg/m²         Physical Exam:  Physical Exam:  General:  Alert, cooperative, no distress, appears stated age. Eyes:  Conjunctivae/corneas clear. Ears:  Normal external ear canals both ears. Nose: Nares normal. Septum midline. Mouth/Throat: Lips, mucosa, and tongue normal. Teeth and gums normal.   Neck: Supple, symmetrical, trachea midline   Back:   Symmetric, no curvature. ROM normal.    Lungs:   Clear to auscultation bilaterally. Heart:  Regular rate and rhythm   Abdomen:   Soft, non-tender. Bowel sounds normal. Diastasis recti, no palpable hernia   Extremities: Extremities normal, atraumatic, no cyanosis or edema.    Skin: Skin color, texture, turgor normal. No rashes or lesions         Assessment:     46year old male presented with concerns for a hernia    Plan:     Patient has a diastasis recti, no palpable hernia   No acute surgical intervention at this time  If the patient develops a hernia, I will be able to fix diastasis and hernia together

## 2023-01-30 ENCOUNTER — OFFICE VISIT (OUTPATIENT)
Dept: INTERNAL MEDICINE CLINIC | Age: 53
End: 2023-01-30
Payer: COMMERCIAL

## 2023-01-30 VITALS — WEIGHT: 189 LBS | RESPIRATION RATE: 16 BRPM | TEMPERATURE: 97.5 F | BODY MASS INDEX: 30.37 KG/M2 | HEIGHT: 66 IN

## 2023-01-30 DIAGNOSIS — R10.13 EPIGASTRIC PAIN: Primary | ICD-10-CM

## 2023-01-30 DIAGNOSIS — R11.10 VOMITING, UNSPECIFIED VOMITING TYPE, UNSPECIFIED WHETHER NAUSEA PRESENT: ICD-10-CM

## 2023-01-30 PROCEDURE — 99213 OFFICE O/P EST LOW 20 MIN: CPT | Performed by: INTERNAL MEDICINE

## 2023-01-30 NOTE — PROGRESS NOTES
1. \"Have you been to the ER, urgent care clinic since your last visit? Hospitalized since your last visit? \" No    2. \"Have you seen or consulted any other health care providers outside of the [address] [place] [address] since your last visit? \" No     3. For patients aged 39-70: Has the patient had a colonoscopy / FIT/ Cologuard?  Yes - no Care Gap present

## 2023-01-30 NOTE — PROGRESS NOTES
PROGRESS NOTE  Name: Sonja Maria   : 1970       ASSESSMENT/ PLAN:     Diagnoses and all orders for this visit:    Epigastric pain  -     REFERRAL TO GASTROENTEROLOGY  -     US ABD COMP; Future  -     CBC WITH AUTOMATED DIFF; Future  -     METABOLIC PANEL, COMPREHENSIVE; Future  -     AMYLASE; Future  -     LIPASE; Future    Vomiting, unspecified vomiting type, unspecified whether nausea present  -     REFERRAL TO GASTROENTEROLOGY  -     US ABD COMP; Future  -     CBC WITH AUTOMATED DIFF; Future  -     METABOLIC PANEL, COMPREHENSIVE; Future  -     AMYLASE; Future  -     LIPASE; Future    Follow-up and Dispositions    Return if symptoms worsen or fail to improve. I have reviewed the patient's medications and risks/side effects/benefits were discussed. Diagnosis(-es) explained to patient and questions answered. Literature provided where appropriate. SUBJECTIVE  Mr. Sonja Maria presents today acutely for     Chief Complaint   Patient presents with    Abdominal Pain     He has had epigastric pain for a few weeks. This has been associated with vomiting of \"stomach acid. \"     \"I weaned off oxycontin after surgery, which was Dec 30th. The stomach pain started about . \"       He has tried omeprazole, TUMS, and diet modification. OBJECTIVE  Visit Vitals  Temp 97.5 °F (36.4 °C) (Temporal)   Resp 16   Ht 5' 6\" (1.676 m)   Wt 189 lb (85.7 kg)   BMI 30.51 kg/m²     Gen: Pleasant 46 y.o.  male in NAD. HEART: RRR, No M/G/R.   LUNGS: CTAB No W/R. ABDOMEN: S, mild tenderness in epigastrium without guarding or RT, ND, BS+. EXTREMITIES: Warm.  No C/C/E.

## 2023-01-31 LAB
ALBUMIN SERPL-MCNC: 3.9 G/DL (ref 3.5–5)
ALBUMIN/GLOB SERPL: 1.3 (ref 1.1–2.2)
ALP SERPL-CCNC: 182 U/L (ref 45–117)
ALT SERPL-CCNC: 224 U/L (ref 12–78)
AMYLASE SERPL-CCNC: 67 U/L (ref 25–115)
ANION GAP SERPL CALC-SCNC: 6 MMOL/L (ref 5–15)
AST SERPL-CCNC: 32 U/L (ref 15–37)
BASOPHILS # BLD: 0.1 K/UL (ref 0–0.1)
BASOPHILS NFR BLD: 2 % (ref 0–1)
BILIRUB SERPL-MCNC: 0.6 MG/DL (ref 0.2–1)
BUN SERPL-MCNC: 15 MG/DL (ref 6–20)
BUN/CREAT SERPL: 15 (ref 12–20)
CALCIUM SERPL-MCNC: 9.4 MG/DL (ref 8.5–10.1)
CHLORIDE SERPL-SCNC: 105 MMOL/L (ref 97–108)
CO2 SERPL-SCNC: 30 MMOL/L (ref 21–32)
CREAT SERPL-MCNC: 1.02 MG/DL (ref 0.7–1.3)
DIFFERENTIAL METHOD BLD: ABNORMAL
EOSINOPHIL # BLD: 0.4 K/UL (ref 0–0.4)
EOSINOPHIL NFR BLD: 9 % (ref 0–7)
ERYTHROCYTE [DISTWIDTH] IN BLOOD BY AUTOMATED COUNT: 11.9 % (ref 11.5–14.5)
GLOBULIN SER CALC-MCNC: 3 G/DL (ref 2–4)
GLUCOSE SERPL-MCNC: 86 MG/DL (ref 65–100)
HCT VFR BLD AUTO: 43.7 % (ref 36.6–50.3)
HGB BLD-MCNC: 14.6 G/DL (ref 12.1–17)
IMM GRANULOCYTES # BLD AUTO: 0 K/UL (ref 0–0.04)
IMM GRANULOCYTES NFR BLD AUTO: 0 % (ref 0–0.5)
LIPASE SERPL-CCNC: 161 U/L (ref 73–393)
LYMPHOCYTES # BLD: 1.4 K/UL (ref 0.8–3.5)
LYMPHOCYTES NFR BLD: 28 % (ref 12–49)
MCH RBC QN AUTO: 30.3 PG (ref 26–34)
MCHC RBC AUTO-ENTMCNC: 33.4 G/DL (ref 30–36.5)
MCV RBC AUTO: 90.7 FL (ref 80–99)
MONOCYTES # BLD: 0.4 K/UL (ref 0–1)
MONOCYTES NFR BLD: 7 % (ref 5–13)
NEUTS SEG # BLD: 2.8 K/UL (ref 1.8–8)
NEUTS SEG NFR BLD: 54 % (ref 32–75)
NRBC # BLD: 0 K/UL (ref 0–0.01)
NRBC BLD-RTO: 0 PER 100 WBC
PLATELET # BLD AUTO: 234 K/UL (ref 150–400)
PMV BLD AUTO: 10.2 FL (ref 8.9–12.9)
POTASSIUM SERPL-SCNC: 4.2 MMOL/L (ref 3.5–5.1)
PROT SERPL-MCNC: 6.9 G/DL (ref 6.4–8.2)
RBC # BLD AUTO: 4.82 M/UL (ref 4.1–5.7)
SODIUM SERPL-SCNC: 141 MMOL/L (ref 136–145)
WBC # BLD AUTO: 5.1 K/UL (ref 4.1–11.1)

## 2023-02-07 ENCOUNTER — PATIENT MESSAGE (OUTPATIENT)
Dept: INTERNAL MEDICINE CLINIC | Age: 53
End: 2023-02-07

## 2023-02-08 ENCOUNTER — HOSPITAL ENCOUNTER (OUTPATIENT)
Dept: ULTRASOUND IMAGING | Age: 53
Discharge: HOME | End: 2023-02-08
Attending: INTERNAL MEDICINE
Payer: COMMERCIAL

## 2023-02-08 DIAGNOSIS — R10.13 EPIGASTRIC PAIN: ICD-10-CM

## 2023-02-08 DIAGNOSIS — R11.10 VOMITING, UNSPECIFIED VOMITING TYPE, UNSPECIFIED WHETHER NAUSEA PRESENT: ICD-10-CM

## 2023-02-08 PROCEDURE — 76700 US EXAM ABDOM COMPLETE: CPT

## 2023-03-25 RX ORDER — TRETIONIN 0.1 MG/G
GEL TOPICAL
Qty: 15 G | Refills: 1 | Status: SHIPPED | OUTPATIENT
Start: 2023-03-25

## 2023-04-15 NOTE — TELEPHONE ENCOUNTER
Cardiopulmonary Rehab Nursing Entry:    Pt returned call to schedule Cardiac Rehab. Pt reporting that he was not aware of the referral and is unsure what the program entails. Verbal explanation of the program provided for pt. He reported that his work commitment will not permit him to commit to participation in [address] [facility] rehab. In addition pt reports home exercise routine including running. Pt politely declined enrollment in CR. Advised that he can be referred at a later time if needed.
Attending Attestation (For Attendings USE Only)...

## 2023-07-10 RX ORDER — TRETINOIN 0.1 MG/G
GEL TOPICAL
Qty: 15 G | Refills: 1 | Status: SHIPPED | OUTPATIENT
Start: 2023-07-10

## 2023-09-05 ENCOUNTER — OFFICE VISIT (OUTPATIENT)
Age: 53
End: 2023-09-05
Payer: COMMERCIAL

## 2023-09-05 VITALS
BODY MASS INDEX: 29.7 KG/M2 | HEART RATE: 65 BPM | DIASTOLIC BLOOD PRESSURE: 79 MMHG | TEMPERATURE: 97.9 F | WEIGHT: 184.8 LBS | HEIGHT: 66 IN | SYSTOLIC BLOOD PRESSURE: 120 MMHG | RESPIRATION RATE: 18 BRPM | OXYGEN SATURATION: 96 %

## 2023-09-05 DIAGNOSIS — K82.4 GALLBLADDER POLYP: Primary | ICD-10-CM

## 2023-09-05 PROCEDURE — 3074F SYST BP LT 130 MM HG: CPT | Performed by: SURGERY

## 2023-09-05 PROCEDURE — 99214 OFFICE O/P EST MOD 30 MIN: CPT | Performed by: SURGERY

## 2023-09-05 PROCEDURE — 3078F DIAST BP <80 MM HG: CPT | Performed by: SURGERY

## 2023-09-05 RX ORDER — FAMOTIDINE 40 MG/1
TABLET, FILM COATED ORAL
COMMUNITY
Start: 2023-06-29

## 2023-09-05 RX ORDER — LOSARTAN POTASSIUM 100 MG/1
100 TABLET ORAL DAILY
COMMUNITY
Start: 2023-06-27

## 2023-09-05 ASSESSMENT — PATIENT HEALTH QUESTIONNAIRE - PHQ9
SUM OF ALL RESPONSES TO PHQ QUESTIONS 1-9: 0
SUM OF ALL RESPONSES TO PHQ QUESTIONS 1-9: 0
2. FEELING DOWN, DEPRESSED OR HOPELESS: 0
1. LITTLE INTEREST OR PLEASURE IN DOING THINGS: 0
SUM OF ALL RESPONSES TO PHQ9 QUESTIONS 1 & 2: 0
SUM OF ALL RESPONSES TO PHQ QUESTIONS 1-9: 0
SUM OF ALL RESPONSES TO PHQ QUESTIONS 1-9: 0

## 2023-09-05 NOTE — PROGRESS NOTES
Surgery History and Physical    Subjective:   9/5/23: Patient presents to discuss gallbladder polyps. He is known to [facility] with Virgie Horta and Dr. Dat Toney. Tolerating a diet. Having bowel function. Back in Dec in last year, he had a shoulder replacement. In Jan, he had bad epigastric abdominal pain that wound't go away with heartburn meds. He had a stent placed in 2020 - cardiologist is Dr. Francisco J Holland. He has had surgery since then. 8/10/23 RUQ US: :masses in the gallbladder, potentially polyps or less likely gallstones. RUQ US (2/8/23): IMPRESSION:  No acute intra-abdominal pathology to cause abdominal pain. Gallbladder polyps. 12/21/22: Alicia Perez is a 46 y.o. male who presents for evaluation of ventral hernia. He had his annual physical with his PCP - and he was told he had an abdominal wall hernia. He is unsure how long he had it. Its not causing any pain or discomfort. Tolerating a diet and having bowel function. Cardiologist is Francisco J Holland - on ASA - Last MI July 15th, 2020. He has had surgeries since then - left hip replacement and right hip replacement. He is scheduled to get shoulder replacement at the end of Dec.       Past Medical History:   Diagnosis Date    Bursitis of shoulder     right; \"comes and goes\"    CAD (coronary artery disease)     Dyslipidemia 7/15/2020    Essential hypertension 9/8/2017    GERD (gastroesophageal reflux disease)     Heart attack ([facility])     Hypercholesteremia     Osteoarthritis      Past Surgical History:   Procedure Laterality Date    HX ACL RECONSTRUCTION Left     HX HERNIA REPAIR      HX LUMBAR LAMINECTOMY  2018    Dr. Trevin Soria; L4-5 level.      HX OTHER SURGICAL      INGROWN TOE NAIL REMOVED OFF L big toe     HX OTHER SURGICAL  03/2018    orthoscopy--R hip    HX REFRACTIVE SURGERY      HX SHOULDER ARTHROSCOPY  3/01/2012    Right shoulder     HX SHOULDER ARTHROSCOPY  04/2014    R shoulder     HX VASECTOMY        Family History   Problem Relation Age of Onset    Other

## 2023-10-16 RX ORDER — M-VIT,TX,IRON,MINS/CALC/FOLIC 27MG-0.4MG
1 TABLET ORAL DAILY
COMMUNITY

## 2023-10-18 ENCOUNTER — HOSPITAL ENCOUNTER (OUTPATIENT)
Facility: HOSPITAL | Age: 53
Setting detail: OUTPATIENT SURGERY
Discharge: HOME | End: 2023-10-18
Attending: SURGERY | Admitting: SURGERY
Payer: COMMERCIAL

## 2023-10-18 ENCOUNTER — APPOINTMENT (OUTPATIENT)
Facility: HOSPITAL | Age: 53
End: 2023-10-18
Attending: SURGERY
Payer: COMMERCIAL

## 2023-10-18 ENCOUNTER — ANESTHESIA EVENT (OUTPATIENT)
Facility: HOSPITAL | Age: 53
End: 2023-10-18
Payer: COMMERCIAL

## 2023-10-18 ENCOUNTER — ANESTHESIA (OUTPATIENT)
Facility: HOSPITAL | Age: 53
End: 2023-10-18
Payer: COMMERCIAL

## 2023-10-18 VITALS
WEIGHT: 184.08 LBS | TEMPERATURE: 97.7 F | HEART RATE: 68 BPM | BODY MASS INDEX: 29.58 KG/M2 | DIASTOLIC BLOOD PRESSURE: 75 MMHG | SYSTOLIC BLOOD PRESSURE: 115 MMHG | HEIGHT: 66 IN | OXYGEN SATURATION: 96 % | RESPIRATION RATE: 18 BRPM

## 2023-10-18 DIAGNOSIS — K82.4 GALLBLADDER POLYP: Primary | ICD-10-CM

## 2023-10-18 PROCEDURE — 7100000011 HC PHASE II RECOVERY - ADDTL 15 MIN: Performed by: SURGERY

## 2023-10-18 PROCEDURE — 2709999900 HC NON-CHARGEABLE SUPPLY: Performed by: SURGERY

## 2023-10-18 PROCEDURE — 2500000003 HC RX 250 WO HCPCS: Performed by: NURSE ANESTHETIST, CERTIFIED REGISTERED

## 2023-10-18 PROCEDURE — 3600000004 HC SURGERY LEVEL 4 BASE: Performed by: SURGERY

## 2023-10-18 PROCEDURE — 3600000014 HC SURGERY LEVEL 4 ADDTL 15MIN: Performed by: SURGERY

## 2023-10-18 PROCEDURE — 74300 X-RAY BILE DUCTS/PANCREAS: CPT | Performed by: SURGERY

## 2023-10-18 PROCEDURE — 3700000000 HC ANESTHESIA ATTENDED CARE: Performed by: SURGERY

## 2023-10-18 PROCEDURE — 2580000003 HC RX 258: Performed by: STUDENT IN AN ORGANIZED HEALTH CARE EDUCATION/TRAINING PROGRAM

## 2023-10-18 PROCEDURE — 47563 LAPARO CHOLECYSTECTOMY/GRAPH: CPT | Performed by: SURGERY

## 2023-10-18 PROCEDURE — 2580000003 HC RX 258: Performed by: ANESTHESIOLOGY

## 2023-10-18 PROCEDURE — 7100000001 HC PACU RECOVERY - ADDTL 15 MIN: Performed by: SURGERY

## 2023-10-18 PROCEDURE — 7100000000 HC PACU RECOVERY - FIRST 15 MIN: Performed by: SURGERY

## 2023-10-18 PROCEDURE — 3700000001 HC ADD 15 MINUTES (ANESTHESIA): Performed by: SURGERY

## 2023-10-18 PROCEDURE — 6360000002 HC RX W HCPCS: Performed by: SURGERY

## 2023-10-18 PROCEDURE — 2580000003 HC RX 258: Performed by: SURGERY

## 2023-10-18 PROCEDURE — 6360000004 HC RX CONTRAST MEDICATION: Performed by: SURGERY

## 2023-10-18 PROCEDURE — 2720000010 HC SURG SUPPLY STERILE: Performed by: SURGERY

## 2023-10-18 PROCEDURE — 88304 TISSUE EXAM BY PATHOLOGIST: CPT

## 2023-10-18 PROCEDURE — 6360000002 HC RX W HCPCS: Performed by: NURSE ANESTHETIST, CERTIFIED REGISTERED

## 2023-10-18 PROCEDURE — 7100000010 HC PHASE II RECOVERY - FIRST 15 MIN: Performed by: SURGERY

## 2023-10-18 RX ORDER — IBUPROFEN 600 MG/1
600 TABLET ORAL EVERY 6 HOURS PRN
Qty: 30 TABLET | Refills: 0 | Status: SHIPPED | OUTPATIENT
Start: 2023-10-18

## 2023-10-18 RX ORDER — GLYCOPYRROLATE 0.2 MG/ML
INJECTION INTRAMUSCULAR; INTRAVENOUS PRN
Status: DISCONTINUED | OUTPATIENT
Start: 2023-10-18 | End: 2023-10-18 | Stop reason: SDUPTHER

## 2023-10-18 RX ORDER — FENTANYL CITRATE 50 UG/ML
25 INJECTION, SOLUTION INTRAMUSCULAR; INTRAVENOUS EVERY 5 MIN PRN
Status: DISCONTINUED | OUTPATIENT
Start: 2023-10-18 | End: 2023-10-18 | Stop reason: HOSPADM

## 2023-10-18 RX ORDER — BUPIVACAINE HYDROCHLORIDE 5 MG/ML
INJECTION, SOLUTION PERINEURAL PRN
Status: DISCONTINUED | OUTPATIENT
Start: 2023-10-18 | End: 2023-10-18 | Stop reason: ALTCHOICE

## 2023-10-18 RX ORDER — DEXTROSE MONOHYDRATE 100 MG/ML
INJECTION, SOLUTION INTRAVENOUS CONTINUOUS PRN
Status: DISCONTINUED | OUTPATIENT
Start: 2023-10-18 | End: 2023-10-18 | Stop reason: HOSPADM

## 2023-10-18 RX ORDER — DEXAMETHASONE SODIUM PHOSPHATE 4 MG/ML
INJECTION, SOLUTION INTRA-ARTICULAR; INTRALESIONAL; INTRAMUSCULAR; INTRAVENOUS; SOFT TISSUE PRN
Status: DISCONTINUED | OUTPATIENT
Start: 2023-10-18 | End: 2023-10-18 | Stop reason: SDUPTHER

## 2023-10-18 RX ORDER — KETOROLAC TROMETHAMINE 30 MG/ML
INJECTION, SOLUTION INTRAMUSCULAR; INTRAVENOUS PRN
Status: DISCONTINUED | OUTPATIENT
Start: 2023-10-18 | End: 2023-10-18 | Stop reason: SDUPTHER

## 2023-10-18 RX ORDER — ONDANSETRON 4 MG/1
4 TABLET, FILM COATED ORAL EVERY 8 HOURS PRN
Qty: 15 TABLET | Refills: 0 | Status: SHIPPED | OUTPATIENT
Start: 2023-10-18

## 2023-10-18 RX ORDER — LIDOCAINE HYDROCHLORIDE 20 MG/ML
INJECTION, SOLUTION EPIDURAL; INFILTRATION; INTRACAUDAL; PERINEURAL PRN
Status: DISCONTINUED | OUTPATIENT
Start: 2023-10-18 | End: 2023-10-18 | Stop reason: SDUPTHER

## 2023-10-18 RX ORDER — OXYCODONE HYDROCHLORIDE 5 MG/1
5 TABLET ORAL EVERY 6 HOURS PRN
Qty: 12 TABLET | Refills: 0 | Status: SHIPPED | OUTPATIENT
Start: 2023-10-18 | End: 2023-10-21

## 2023-10-18 RX ORDER — FENTANYL CITRATE 50 UG/ML
INJECTION, SOLUTION INTRAMUSCULAR; INTRAVENOUS PRN
Status: DISCONTINUED | OUTPATIENT
Start: 2023-10-18 | End: 2023-10-18 | Stop reason: SDUPTHER

## 2023-10-18 RX ORDER — ONDANSETRON 2 MG/ML
INJECTION INTRAMUSCULAR; INTRAVENOUS PRN
Status: DISCONTINUED | OUTPATIENT
Start: 2023-10-18 | End: 2023-10-18 | Stop reason: SDUPTHER

## 2023-10-18 RX ORDER — EPHEDRINE SULFATE/0.9% NACL/PF 50 MG/5 ML
SYRINGE (ML) INTRAVENOUS PRN
Status: DISCONTINUED | OUTPATIENT
Start: 2023-10-18 | End: 2023-10-18 | Stop reason: SDUPTHER

## 2023-10-18 RX ORDER — MIDAZOLAM HYDROCHLORIDE 1 MG/ML
INJECTION INTRAMUSCULAR; INTRAVENOUS PRN
Status: DISCONTINUED | OUTPATIENT
Start: 2023-10-18 | End: 2023-10-18 | Stop reason: SDUPTHER

## 2023-10-18 RX ORDER — PROCHLORPERAZINE EDISYLATE 5 MG/ML
5 INJECTION INTRAMUSCULAR; INTRAVENOUS
Status: DISCONTINUED | OUTPATIENT
Start: 2023-10-18 | End: 2023-10-18 | Stop reason: HOSPADM

## 2023-10-18 RX ORDER — NEOSTIGMINE METHYLSULFATE 1 MG/ML
INJECTION, SOLUTION INTRAVENOUS PRN
Status: DISCONTINUED | OUTPATIENT
Start: 2023-10-18 | End: 2023-10-18 | Stop reason: SDUPTHER

## 2023-10-18 RX ORDER — SODIUM CHLORIDE 0.9 % (FLUSH) 0.9 %
5-40 SYRINGE (ML) INJECTION PRN
Status: DISCONTINUED | OUTPATIENT
Start: 2023-10-18 | End: 2023-10-18 | Stop reason: HOSPADM

## 2023-10-18 RX ORDER — ONDANSETRON 2 MG/ML
4 INJECTION INTRAMUSCULAR; INTRAVENOUS
Status: DISCONTINUED | OUTPATIENT
Start: 2023-10-18 | End: 2023-10-18 | Stop reason: HOSPADM

## 2023-10-18 RX ORDER — SODIUM CHLORIDE 0.9 % (FLUSH) 0.9 %
5-40 SYRINGE (ML) INJECTION EVERY 12 HOURS SCHEDULED
Status: DISCONTINUED | OUTPATIENT
Start: 2023-10-18 | End: 2023-10-18 | Stop reason: HOSPADM

## 2023-10-18 RX ORDER — SODIUM CHLORIDE 9 MG/ML
INJECTION, SOLUTION INTRAVENOUS PRN
Status: DISCONTINUED | OUTPATIENT
Start: 2023-10-18 | End: 2023-10-18 | Stop reason: HOSPADM

## 2023-10-18 RX ORDER — PHENYLEPHRINE HCL IN 0.9% NACL 0.4MG/10ML
SYRINGE (ML) INTRAVENOUS PRN
Status: DISCONTINUED | OUTPATIENT
Start: 2023-10-18 | End: 2023-10-18 | Stop reason: SDUPTHER

## 2023-10-18 RX ORDER — SODIUM CHLORIDE, SODIUM LACTATE, POTASSIUM CHLORIDE, CALCIUM CHLORIDE 600; 310; 30; 20 MG/100ML; MG/100ML; MG/100ML; MG/100ML
INJECTION, SOLUTION INTRAVENOUS ONCE
Status: COMPLETED | OUTPATIENT
Start: 2023-10-18 | End: 2023-10-18

## 2023-10-18 RX ORDER — PROPOFOL 10 MG/ML
INJECTION, EMULSION INTRAVENOUS PRN
Status: DISCONTINUED | OUTPATIENT
Start: 2023-10-18 | End: 2023-10-18 | Stop reason: SDUPTHER

## 2023-10-18 RX ORDER — ROCURONIUM BROMIDE 10 MG/ML
INJECTION, SOLUTION INTRAVENOUS PRN
Status: DISCONTINUED | OUTPATIENT
Start: 2023-10-18 | End: 2023-10-18 | Stop reason: SDUPTHER

## 2023-10-18 RX ORDER — HYDROMORPHONE HYDROCHLORIDE 1 MG/ML
0.5 INJECTION, SOLUTION INTRAMUSCULAR; INTRAVENOUS; SUBCUTANEOUS EVERY 5 MIN PRN
Status: DISCONTINUED | OUTPATIENT
Start: 2023-10-18 | End: 2023-10-18 | Stop reason: HOSPADM

## 2023-10-18 RX ADMIN — ROCURONIUM BROMIDE 50 MG: 10 INJECTION INTRAVENOUS at 12:24

## 2023-10-18 RX ADMIN — ONDANSETRON 4 MG: 2 INJECTION INTRAMUSCULAR; INTRAVENOUS at 13:07

## 2023-10-18 RX ADMIN — Medication 80 MCG: at 12:45

## 2023-10-18 RX ADMIN — FENTANYL CITRATE 50 MCG: 50 INJECTION, SOLUTION INTRAMUSCULAR; INTRAVENOUS at 12:18

## 2023-10-18 RX ADMIN — Medication 10 MG: at 12:44

## 2023-10-18 RX ADMIN — GLYCOPYRROLATE 0.6 MG: 0.2 INJECTION INTRAMUSCULAR; INTRAVENOUS at 13:07

## 2023-10-18 RX ADMIN — SODIUM CHLORIDE: 9 INJECTION, SOLUTION INTRAVENOUS at 12:21

## 2023-10-18 RX ADMIN — LIDOCAINE HYDROCHLORIDE 80 MG: 20 INJECTION, SOLUTION EPIDURAL; INFILTRATION; INTRACAUDAL; PERINEURAL at 12:24

## 2023-10-18 RX ADMIN — WATER 2000 MG: 1 INJECTION INTRAMUSCULAR; INTRAVENOUS; SUBCUTANEOUS at 12:33

## 2023-10-18 RX ADMIN — KETOROLAC TROMETHAMINE 30 MG: 30 INJECTION, SOLUTION INTRAMUSCULAR; INTRAVENOUS at 13:08

## 2023-10-18 RX ADMIN — MIDAZOLAM HYDROCHLORIDE 2 MG: 1 INJECTION, SOLUTION INTRAMUSCULAR; INTRAVENOUS at 12:18

## 2023-10-18 RX ADMIN — DEXAMETHASONE SODIUM PHOSPHATE 4 MG: 4 INJECTION INTRA-ARTICULAR; INTRALESIONAL; INTRAMUSCULAR; INTRAVENOUS; SOFT TISSUE at 12:20

## 2023-10-18 RX ADMIN — Medication 4 MG: at 13:07

## 2023-10-18 RX ADMIN — SODIUM CHLORIDE, POTASSIUM CHLORIDE, SODIUM LACTATE AND CALCIUM CHLORIDE: 600; 310; 30; 20 INJECTION, SOLUTION INTRAVENOUS at 12:10

## 2023-10-18 RX ADMIN — LIDOCAINE HYDROCHLORIDE 100 MG: 20 INJECTION, SOLUTION EPIDURAL; INFILTRATION; INTRACAUDAL; PERINEURAL at 13:07

## 2023-10-18 RX ADMIN — Medication 10 MG: at 12:47

## 2023-10-18 RX ADMIN — PROPOFOL 150 MG: 10 INJECTION, EMULSION INTRAVENOUS at 12:24

## 2023-10-18 ASSESSMENT — PAIN - FUNCTIONAL ASSESSMENT: PAIN_FUNCTIONAL_ASSESSMENT: 0-10

## 2023-10-18 NOTE — FLOWSHEET NOTE
10/18/23 1330   Handoff   Communication Given Transfer Handoff   Handoff phase Phase I receiving   Handoff Given To Ilene Landrum RN   Handoff Received From JERRI Esquivel RN and [facility] CRNA   Handoff Communication Face to Face; At bedside     1430 Report given to [facility]. Transferred to Phase II for discharge instructions.

## 2023-10-18 NOTE — OP NOTE
checklist was completed to share information critical to postoperative care of the patient. The patient was extubated in the OR and transferred to PACU in stable condition. Counts: Instrument, sponge, and needle counts were correct prior to closure and at the conclusion of the case.      Signed By:  Cony Daniel MD     October 18, 2023

## 2023-10-18 NOTE — H&P
Surgery History and Physical    Subjective:   10/18/23: Patient presents for surgery    9/5/23: Patient presents to discuss gallbladder polyps. He is known to [facility] with Virgie Horta and Dr. Dat Toney. Tolerating a diet. Having bowel function. Back in Dec in last year, he had a shoulder replacement. In Jan, he had bad epigastric abdominal pain that wound't go away with heartburn meds. He had a stent placed in 2020 - cardiologist is Dr. Francisco J Holland. He has had surgery since then. 8/10/23 RUQ US: :masses in the gallbladder, potentially polyps or less likely gallstones. RUQ US (2/8/23): IMPRESSION:  No acute intra-abdominal pathology to cause abdominal pain. Gallbladder polyps. 12/21/22: Alicia Perez is a 46 y.o. male who presents for evaluation of ventral hernia. He had his annual physical with his PCP - and he was told he had an abdominal wall hernia. He is unsure how long he had it. Its not causing any pain or discomfort. Tolerating a diet and having bowel function. Cardiologist is Francisco J Holland - on ASA - Last MI July 15th, 2020. He has had surgeries since then - left hip replacement and right hip replacement. He is scheduled to get shoulder replacement at the end of Dec.       Past Medical History:   Diagnosis Date    Bursitis of shoulder     right; \"comes and goes\"    CAD (coronary artery disease)     Dyslipidemia 7/15/2020    Essential hypertension 9/8/2017    GERD (gastroesophageal reflux disease)     Heart attack ([address])     Hypercholesteremia     Osteoarthritis      Past Surgical History:   Procedure Laterality Date    HX ACL RECONSTRUCTION Left     HX HERNIA REPAIR      HX LUMBAR LAMINECTOMY  2018    Dr. Trevin Soria; L4-5 level.      HX OTHER SURGICAL      INGROWN TOE NAIL REMOVED OFF L big toe     HX OTHER SURGICAL  03/2018    orthoscopy--R hip    HX REFRACTIVE SURGERY      HX SHOULDER ARTHROSCOPY  3/01/2012    Right shoulder     HX SHOULDER ARTHROSCOPY  04/2014    R shoulder     HX VASECTOMY        Family History

## 2023-10-18 NOTE — DISCHARGE INSTRUCTIONS
Dr. Shelli Rushing Discharge Instructions after  Laparoscopic Cholecystectomy      iDamond Harmon   [id number] : 1970    Admitted 10/18/2023 Discharged: 10/18/2023       What to do at Home    Recommended diet: Low Fat Diet for 1 month    Recommended activity: as tolerated, do not drive for 3-5 days while on pain medicine. Follow-up with Dr. Elyse Castellano  in 2 weeks. Call [phone] for an appointment. Cholecystectomy: What to Expect at [address] [address] [address]    After your surgery, it is normal to feel weak and tired for several days after you return home. Your belly may be swollen. If you had laparoscopic surgery, you may also have pain in your shoulder for about 24 hours. You may have gas or need to burp a lot at first, and a few people get diarrhea. The diarrhea usually goes away in 2 to 4 weeks, but it may last longer. How quickly you recover depends on whether you had a laparoscopic or open surgery. For a laparoscopic surgery, most people can go back to work or their normal routine in 1 to 2 weeks, but it may take longer, depending on the type of work you do. For an open surgery, it will probably take 4 to 6 weeks before you get back to your normal routine. This care sheet gives you a general idea about how long it will take for you to recover. However, each person recovers at a different pace. Follow the steps below to get better as quickly as possible. How can you care for yourself at home? Activity  Rest when you feel tired. Getting enough sleep will help you recover. Try to walk each day. Start out by walking a little more than you did the day before. Gradually increase the amount you walk. Walking boosts blood flow and helps prevent pneumonia and constipation. Avoid strenuous activities, such as biking, jogging, weightlifting, and aerobic exercise, for 1-2 weeks. You may shower 24 hours after surgery. Pat the cut (incision) dry.  Do not take a bath for the first 2 weeks, or

## 2023-10-19 NOTE — ANESTHESIA POSTPROCEDURE EVALUATION
Department of Anesthesiology  Postprocedure Note    Patient: Eloy Zambrano  MRN: [id number]  YOB: 1970  Date of evaluation: 10/19/2023      Procedure Summary     Date: 10/18/23 Room / Location: [facility] MAIN OR M4 / [facility] MAIN OR    Anesthesia Start: 1218 Anesthesia Stop: 1332    Procedure: LAPAROSCOPIC CHOLECYSTECTOMY WITH CHOLANGIOGRAM (Abdomen) Diagnosis:       Gall bladder polyp      (Gall bladder polyp [K82.4])    Providers: Juan Joaquin MD Responsible Provider: Lisa Jaramillo MD    Anesthesia Type: general ASA Status: 3          Anesthesia Type: No value filed.     Redd Phase I: Redd Score: 10    Redd Phase II: Redd Score: 10      Anesthesia Post Evaluation    Patient location during evaluation: PACU  Patient participation: complete - patient participated  Level of consciousness: awake and alert  Airway patency: patent  Nausea & Vomiting: no nausea  Complications: no  Cardiovascular status: hemodynamically stable  Respiratory status: acceptable  Hydration status: euvolemic  Pain management: adequate

## 2023-11-01 ENCOUNTER — OFFICE VISIT (OUTPATIENT)
Age: 53
End: 2023-11-01

## 2023-11-01 VITALS
HEART RATE: 63 BPM | RESPIRATION RATE: 20 BRPM | WEIGHT: 185.4 LBS | SYSTOLIC BLOOD PRESSURE: 135 MMHG | BODY MASS INDEX: 29.8 KG/M2 | TEMPERATURE: 97.9 F | HEIGHT: 66 IN | OXYGEN SATURATION: 94 % | DIASTOLIC BLOOD PRESSURE: 87 MMHG

## 2023-11-01 DIAGNOSIS — K80.10 CALCULUS OF GALLBLADDER WITH CHRONIC CHOLECYSTITIS WITHOUT OBSTRUCTION: Primary | ICD-10-CM

## 2023-11-01 PROCEDURE — 99024 POSTOP FOLLOW-UP VISIT: CPT | Performed by: SURGERY

## 2023-11-01 NOTE — PROGRESS NOTES
Identified pt with two pt identifiers(name and ). Reviewed record in preparation for visit and have obtained necessary documentation. All patient medications has been reviewed. Chief Complaint   Patient presents with    Post-Op Check     S/P LAPAROSCOPIC CHOLECYSTECTOMY WITH CHOLANGIOGRAM 10/18/2023       Health Maintenance Due   Topic    Hepatitis B vaccine (1 of 3 - 3-dose series)    HIV screen     Colorectal Cancer Screen        @[email or web address]@    4. Have you been to the ER, urgent care clinic since your last visit? Hospitalized since your last visit?no    5. Have you seen or consulted any other health care providers outside of the [address] [place] [address] since your last visit? Include any pap smears or colon screening. no      Patient is accompanied by self I have received verbal consent from [employer] to discuss any/all medical information while they are present in the room.

## 2023-11-01 NOTE — PROGRESS NOTES
SUBJECTIVE: Radha Canela is a 46 y.o. male is seen for a routine postop check s/p lap nichole with ioc. Reports no problems with the wound or other issues. Activity, diet and bowels are normal. No pain. No fevers    Gallbladder, cholecystectomy:        Chronic cholecystitis. OBJECTIVE:   There were no vitals filed for this visit. General: Appears well. Incision: healing well, no drainage, no erythema, no seroma, incision well approximated, no swelling    ASSESSMENT: normal postoperative course, doing well.     PLAN:   Wound care discussed  No heavy lifting for 6 weeks  Follow up PRN

## 2023-11-14 RX ORDER — TRETINOIN 0.1 MG/G
GEL TOPICAL
Qty: 15 G | Refills: 1 | Status: SHIPPED | OUTPATIENT
Start: 2023-11-14

## 2023-12-11 ENCOUNTER — OFFICE VISIT (OUTPATIENT)
Age: 53
End: 2023-12-11
Payer: COMMERCIAL

## 2023-12-11 VITALS
DIASTOLIC BLOOD PRESSURE: 80 MMHG | RESPIRATION RATE: 16 BRPM | WEIGHT: 190 LBS | HEART RATE: 61 BPM | TEMPERATURE: 97.5 F | SYSTOLIC BLOOD PRESSURE: 142 MMHG | BODY MASS INDEX: 31.65 KG/M2 | HEIGHT: 65 IN | OXYGEN SATURATION: 96 %

## 2023-12-11 DIAGNOSIS — Z00.00 ENCOUNTER FOR WELL ADULT EXAM WITHOUT ABNORMAL FINDINGS: Primary | ICD-10-CM

## 2023-12-11 PROCEDURE — 99396 PREV VISIT EST AGE 40-64: CPT | Performed by: INTERNAL MEDICINE

## 2023-12-11 PROCEDURE — 3077F SYST BP >= 140 MM HG: CPT | Performed by: INTERNAL MEDICINE

## 2023-12-11 PROCEDURE — 3079F DIAST BP 80-89 MM HG: CPT | Performed by: INTERNAL MEDICINE

## 2023-12-11 ASSESSMENT — PATIENT HEALTH QUESTIONNAIRE - PHQ9
SUM OF ALL RESPONSES TO PHQ QUESTIONS 1-9: 0
SUM OF ALL RESPONSES TO PHQ9 QUESTIONS 1 & 2: 0
SUM OF ALL RESPONSES TO PHQ QUESTIONS 1-9: 0
1. LITTLE INTEREST OR PLEASURE IN DOING THINGS: 0
2. FEELING DOWN, DEPRESSED OR HOPELESS: 0
SUM OF ALL RESPONSES TO PHQ QUESTIONS 1-9: 0
SUM OF ALL RESPONSES TO PHQ QUESTIONS 1-9: 0

## 2023-12-11 NOTE — PATIENT INSTRUCTIONS
Patient Education        Well Visit, Ages 25 to 72: Care Instructions  Well visits can help you stay healthy. Your doctor has checked your overall health and may have suggested ways to take good care of yourself. Your doctor also may have recommended tests. You can help prevent illness with healthy eating, good sleep, vaccinations, regular exercise, and other steps. Get the tests that you and your doctor decide on. Depending on your age and risks, examples might include screening for diabetes; hepatitis C; HIV; and cervical, breast, lung, and colon cancer. Screening helps find diseases before any symptoms appear. Eat healthy foods. Choose fruits, vegetables, whole grains, lean protein, and low-fat dairy foods. Limit saturated fat and reduce salt. Limit alcohol. Men should have no more than 2 drinks a day. Women should have no more than 1. For some people, no alcohol is the best choice. Exercise. Get at least 30 minutes of exercise on most days of the week. Walking can be a good choice. Reach and stay at your healthy weight. This will lower your risk for many health problems. Take care of your mental health. Try to stay connected with friends, family, and community, and find ways to manage stress. If you're feeling depressed or hopeless, talk to someone. A counselor can help. If you don't have a counselor, talk to your doctor. Talk to your doctor if you think you may have a problem with alcohol or drug use. This includes prescription medicines, marijuana, and other drugs. Avoid tobacco and nicotine: Don't smoke, vape, or chew. If you need help quitting, talk to your doctor. Practice safer sex. Getting tested, using condoms or dental dams, and limiting sex partners can help prevent STIs. Use birth control if it's important to you to prevent pregnancy. Talk with your doctor about your choices and what might be best for you. Prevent problems where you can.  Protect your skin from too

## 2023-12-12 ENCOUNTER — NURSE ONLY (OUTPATIENT)
Age: 53
End: 2023-12-12

## 2023-12-12 DIAGNOSIS — Z00.00 ENCOUNTER FOR WELL ADULT EXAM WITHOUT ABNORMAL FINDINGS: ICD-10-CM

## 2023-12-12 LAB
ALBUMIN SERPL-MCNC: 3.9 G/DL (ref 3.5–5)
ALBUMIN/GLOB SERPL: 1.3 (ref 1.1–2.2)
ALP SERPL-CCNC: 78 U/L (ref 45–117)
ALT SERPL-CCNC: 68 U/L (ref 12–78)
ANION GAP SERPL CALC-SCNC: 3 MMOL/L (ref 5–15)
AST SERPL-CCNC: 20 U/L (ref 15–37)
BASOPHILS # BLD: 0.1 K/UL (ref 0–0.1)
BASOPHILS NFR BLD: 1 % (ref 0–1)
BILIRUB SERPL-MCNC: 1.7 MG/DL (ref 0.2–1)
BUN SERPL-MCNC: 16 MG/DL (ref 6–20)
BUN/CREAT SERPL: 15 (ref 12–20)
CALCIUM SERPL-MCNC: 9 MG/DL (ref 8.5–10.1)
CHLORIDE SERPL-SCNC: 109 MMOL/L (ref 97–108)
CHOLEST SERPL-MCNC: 129 MG/DL
CO2 SERPL-SCNC: 28 MMOL/L (ref 21–32)
CREAT SERPL-MCNC: 1.04 MG/DL (ref 0.7–1.3)
DIFFERENTIAL METHOD BLD: NORMAL
EOSINOPHIL # BLD: 0.4 K/UL (ref 0–0.4)
EOSINOPHIL NFR BLD: 7 % (ref 0–7)
ERYTHROCYTE [DISTWIDTH] IN BLOOD BY AUTOMATED COUNT: 11.9 % (ref 11.5–14.5)
GLOBULIN SER CALC-MCNC: 3.1 G/DL (ref 2–4)
GLUCOSE SERPL-MCNC: 96 MG/DL (ref 65–100)
HCT VFR BLD AUTO: 44.1 % (ref 36.6–50.3)
HDLC SERPL-MCNC: 50 MG/DL
HDLC SERPL: 2.6 (ref 0–5)
HGB BLD-MCNC: 14.7 G/DL (ref 12.1–17)
IMM GRANULOCYTES # BLD AUTO: 0 K/UL (ref 0–0.04)
IMM GRANULOCYTES NFR BLD AUTO: 0 % (ref 0–0.5)
LDLC SERPL CALC-MCNC: 52.8 MG/DL (ref 0–100)
LYMPHOCYTES # BLD: 1.4 K/UL (ref 0.8–3.5)
LYMPHOCYTES NFR BLD: 27 % (ref 12–49)
MCH RBC QN AUTO: 29.2 PG (ref 26–34)
MCHC RBC AUTO-ENTMCNC: 33.3 G/DL (ref 30–36.5)
MCV RBC AUTO: 87.7 FL (ref 80–99)
MONOCYTES # BLD: 0.4 K/UL (ref 0–1)
MONOCYTES NFR BLD: 7 % (ref 5–13)
NEUTS SEG # BLD: 2.9 K/UL (ref 1.8–8)
NEUTS SEG NFR BLD: 58 % (ref 32–75)
NRBC # BLD: 0 K/UL (ref 0–0.01)
NRBC BLD-RTO: 0 PER 100 WBC
PLATELET # BLD AUTO: 242 K/UL (ref 150–400)
PMV BLD AUTO: 9.9 FL (ref 8.9–12.9)
POTASSIUM SERPL-SCNC: 4.2 MMOL/L (ref 3.5–5.1)
PROT SERPL-MCNC: 7 G/DL (ref 6.4–8.2)
RBC # BLD AUTO: 5.03 M/UL (ref 4.1–5.7)
SODIUM SERPL-SCNC: 140 MMOL/L (ref 136–145)
TRIGL SERPL-MCNC: 131 MG/DL
VLDLC SERPL CALC-MCNC: 26.2 MG/DL
WBC # BLD AUTO: 5 K/UL (ref 4.1–11.1)

## 2023-12-13 LAB
EST. AVERAGE GLUCOSE BLD GHB EST-MCNC: 111 MG/DL
HBA1C MFR BLD: 5.5 % (ref 4–5.6)

## 2023-12-14 LAB
PSA FREE MFR SERPL: 33.3 %
PSA FREE SERPL-MCNC: 0.8 NG/ML
PSA SERPL-MCNC: 2.4 NG/ML (ref 0–4)

## 2024-02-21 PROBLEM — I21.11 STEMI INVOLVING RIGHT CORONARY ARTERY (HCC): Status: RESOLVED | Noted: 2020-07-15 | Resolved: 2024-02-21

## 2024-02-21 PROBLEM — I21.19 ACUTE ST ELEVATION MYOCARDIAL INFARCTION (STEMI) OF INFERIOR WALL (HCC): Status: RESOLVED | Noted: 2020-07-15 | Resolved: 2024-02-21

## 2024-02-21 PROBLEM — I25.2 HISTORY OF ACUTE MYOCARDIAL INFARCTION: Status: ACTIVE | Noted: 2020-07-15

## 2024-03-01 ENCOUNTER — TELEPHONE (OUTPATIENT)
Age: 54
End: 2024-03-01

## 2024-03-01 NOTE — TELEPHONE ENCOUNTER
tretinoin (RETIN-A) 0.01 % gel    Pt is asking for a dosage change to .025 % per his My Chart mess on 2-28-24    [facility] to [phone]

## 2024-03-05 RX ORDER — TRETINOIN 0.1 MG/G
GEL TOPICAL
Qty: 15 G | Refills: 1 | Status: SHIPPED | OUTPATIENT
Start: 2024-03-05 | End: 2024-03-05 | Stop reason: SDUPTHER

## 2024-03-05 RX ORDER — TRETINOIN 0.1 MG/G
GEL TOPICAL
Qty: 15 G | Refills: 1 | Status: SHIPPED | OUTPATIENT
Start: 2024-03-05

## 2024-07-09 NOTE — TELEPHONE ENCOUNTER
Patient states he needs refill to be done thru [facility] on file for his Tretinoin (RETIN-A) 0.01 % gel . Please call if any questions. Thank you      Patient reminded of [address] on refills

## 2024-07-15 RX ORDER — TRETINOIN 0.1 MG/G
GEL TOPICAL
Qty: 15 G | Refills: 1 | Status: SHIPPED | OUTPATIENT
Start: 2024-07-15

## 2024-07-15 NOTE — TELEPHONE ENCOUNTER
Future Appointments:  Future Appointments   Date Time Provider Department Center   12/16/2024  4:00 PM Jag Ward MD [facility]        Last Appointment With Me:  12/11/2023     Requested Prescriptions     Pending Prescriptions Disp Refills    tretinoin (RETIN-A) 0.01 % gel 15 g 1     Sig: APPLY TO AFFECTED AREA NIGHTLY

## 2024-07-15 NOTE — TELEPHONE ENCOUNTER
Pt calling today to find out where his refill is?  Why is it taking longer?    Please call pt to let him know.

## 2024-09-10 RX ORDER — TRETINOIN 0.1 MG/G
GEL TOPICAL
Qty: 15 G | Refills: 1 | Status: SHIPPED | OUTPATIENT
Start: 2024-09-10

## 2024-12-16 ENCOUNTER — OFFICE VISIT (OUTPATIENT)
Age: 54
End: 2024-12-16
Payer: COMMERCIAL

## 2024-12-16 ENCOUNTER — PATIENT MESSAGE (OUTPATIENT)
Age: 54
End: 2024-12-16

## 2024-12-16 VITALS
DIASTOLIC BLOOD PRESSURE: 88 MMHG | HEART RATE: 63 BPM | WEIGHT: 199.6 LBS | SYSTOLIC BLOOD PRESSURE: 136 MMHG | OXYGEN SATURATION: 96 % | TEMPERATURE: 97.7 F | RESPIRATION RATE: 16 BRPM | BODY MASS INDEX: 33.26 KG/M2 | HEIGHT: 65 IN

## 2024-12-16 DIAGNOSIS — Z00.00 ENCOUNTER FOR WELL ADULT EXAM WITHOUT ABNORMAL FINDINGS: Primary | ICD-10-CM

## 2024-12-16 PROCEDURE — 3079F DIAST BP 80-89 MM HG: CPT | Performed by: INTERNAL MEDICINE

## 2024-12-16 PROCEDURE — 3075F SYST BP GE 130 - 139MM HG: CPT | Performed by: INTERNAL MEDICINE

## 2024-12-16 PROCEDURE — 99396 PREV VISIT EST AGE 40-64: CPT | Performed by: INTERNAL MEDICINE

## 2024-12-16 RX ORDER — TRETINOIN 0.1 MG/G
GEL TOPICAL
Qty: 15 G | Refills: 1 | Status: SHIPPED | OUTPATIENT
Start: 2024-12-16

## 2024-12-16 RX ORDER — METOPROLOL SUCCINATE 25 MG/1
25 TABLET, EXTENDED RELEASE ORAL DAILY
COMMUNITY
Start: 2024-11-08

## 2024-12-16 RX ORDER — ESOMEPRAZOLE MAGNESIUM 40 MG/1
CAPSULE, DELAYED RELEASE ORAL
COMMUNITY
Start: 2024-04-30

## 2024-12-16 ASSESSMENT — PATIENT HEALTH QUESTIONNAIRE - PHQ9
SUM OF ALL RESPONSES TO PHQ QUESTIONS 1-9: 0
SUM OF ALL RESPONSES TO PHQ QUESTIONS 1-9: 0
SUM OF ALL RESPONSES TO PHQ9 QUESTIONS 1 & 2: 0
1. LITTLE INTEREST OR PLEASURE IN DOING THINGS: NOT AT ALL
SUM OF ALL RESPONSES TO PHQ QUESTIONS 1-9: 0
2. FEELING DOWN, DEPRESSED OR HOPELESS: NOT AT ALL
SUM OF ALL RESPONSES TO PHQ QUESTIONS 1-9: 0

## 2024-12-16 NOTE — PROGRESS NOTES
Jan Troncoso is a 54 y.o. male    Chief Complaint   Patient presents with    Annual Exam       /88   Pulse 63   Temp 97.7 °F (36.5 °C)   Resp 16   Ht 1.651 m (5' 5\")   Wt 90.5 kg (199 lb 9.6 oz)   SpO2 96%   BMI 33.22 kg/m²         1. Have you been to the ER, urgent care clinic since your last visit?  Hospitalized since your last visit? No    2. Have you seen or consulted any other health care providers outside of the [facility] since your last visit?  Include any pap smears or colon screening. No    Learning Assessment:       No data to display                Fall Risk Assessment:       No data to display                Abuse Screening:       No data to display                ADL Screening:       No data to display                
orthoscopy--R hip    OTHER SURGICAL HISTORY      [place] TOE NAIL REMOVED OFF L big toe     REFRACTIVE SURGERY Bilateral 2005    SHOULDER ARTHROPLASTY Right 2022    SHOULDER ARTHROSCOPY  04/2014    R shoulder     SHOULDER ARTHROSCOPY  3/01/2012    Right shoulder - torn labrum repair    VASECTOMY  2010     All: Patient has no known allergies.    MEDS:   Current Outpatient Medications on File Prior to Visit   Medication Sig Dispense Refill    esomeprazole (NEXIUM) 40 MG delayed release capsule       omeprazole (PRILOSEC) 20 MG delayed release capsule TAKE 1 CAPSULE BY MOUTH TWICE A DAY BEFORE MEALS FOR 10 DAYS      metoprolol succinate (TOPROL XL) 25 MG extended release tablet Take 1 tablet by mouth daily      METOPROLOL SUCCINATE PO       tretinoin (RETIN-A) 0.01 % gel APPLY TO AFFECTED AREA NIGHTLY 15 g 1    Multiple Vitamins-Minerals (THERAPEUTIC MULTIVITAMIN-MINERALS) tablet Take 1 tablet by mouth daily      losartan (COZAAR) 100 MG tablet Take 1 tablet by mouth daily      famotidine (PEPCID) 40 MG tablet 2 times daily as needed      aspirin 81 MG EC tablet Take 1 tablet by mouth daily      nitroGLYCERIN (NITROSTAT) 0.4 MG SL tablet Place 1 tablet under the tongue      rosuvastatin (CRESTOR) 20 MG tablet Take one tablet by mouth every evening.       No current facility-administered medications on file prior to visit.     FH: Father has a hematologic disorder.  Mother has sciatica.  Two sisters alive and well.   SH: . Lifts weight.  He reports that he has never smoked. He has never used smokeless tobacco. He reports that he does not drink alcohol and does not use drugs.   ROS: See above; Complete ROS otherwise negative.       OBJECTIVE:   Vitals: Blood pressure 136/88, pulse 63, temperature 97.7 °F (36.5 °C), resp. rate 16, height 1.651 m (5' 5\"), weight 90.5 kg (199 lb 9.6 oz), SpO2 96%.  Gen: Pleasant 54 y.o.  male in NAD. HEENT: PERRLA. EOMI. OP moist and pink.  Neck: Supple.  No LAD.

## 2024-12-17 LAB
ALBUMIN SERPL-MCNC: 3.7 G/DL (ref 3.5–5)
ALBUMIN/GLOB SERPL: 1.2 (ref 1.1–2.2)
ALP SERPL-CCNC: 92 U/L (ref 45–117)
ALT SERPL-CCNC: 40 U/L (ref 12–78)
ANION GAP SERPL CALC-SCNC: 2 MMOL/L (ref 2–12)
AST SERPL-CCNC: 20 U/L (ref 15–37)
BASOPHILS # BLD: 0.1 K/UL (ref 0–0.1)
BASOPHILS NFR BLD: 1 % (ref 0–1)
BILIRUB SERPL-MCNC: 0.8 MG/DL (ref 0.2–1)
BUN SERPL-MCNC: 15 MG/DL (ref 6–20)
BUN/CREAT SERPL: 14 (ref 12–20)
CALCIUM SERPL-MCNC: 8.9 MG/DL (ref 8.5–10.1)
CHLORIDE SERPL-SCNC: 107 MMOL/L (ref 97–108)
CHOLEST SERPL-MCNC: 141 MG/DL
CO2 SERPL-SCNC: 30 MMOL/L (ref 21–32)
CREAT SERPL-MCNC: 1.09 MG/DL (ref 0.7–1.3)
DIFFERENTIAL METHOD BLD: NORMAL
EOSINOPHIL # BLD: 0.2 K/UL (ref 0–0.4)
EOSINOPHIL NFR BLD: 4 % (ref 0–7)
ERYTHROCYTE [DISTWIDTH] IN BLOOD BY AUTOMATED COUNT: 11.8 % (ref 11.5–14.5)
EST. AVERAGE GLUCOSE BLD GHB EST-MCNC: 117 MG/DL
GLOBULIN SER CALC-MCNC: 3.2 G/DL (ref 2–4)
GLUCOSE SERPL-MCNC: 114 MG/DL (ref 65–100)
HBA1C MFR BLD: 5.7 % (ref 4–5.6)
HCT VFR BLD AUTO: 45.1 % (ref 36.6–50.3)
HDLC SERPL-MCNC: 40 MG/DL
HDLC SERPL: 3.5 (ref 0–5)
HGB BLD-MCNC: 14.9 G/DL (ref 12.1–17)
IMM GRANULOCYTES # BLD AUTO: 0 K/UL (ref 0–0.04)
IMM GRANULOCYTES NFR BLD AUTO: 0 % (ref 0–0.5)
LDLC SERPL CALC-MCNC: 39.2 MG/DL (ref 0–100)
LYMPHOCYTES # BLD: 1.7 K/UL (ref 0.8–3.5)
LYMPHOCYTES NFR BLD: 31 % (ref 12–49)
MCH RBC QN AUTO: 29.9 PG (ref 26–34)
MCHC RBC AUTO-ENTMCNC: 33 G/DL (ref 30–36.5)
MCV RBC AUTO: 90.6 FL (ref 80–99)
MONOCYTES # BLD: 0.4 K/UL (ref 0–1)
MONOCYTES NFR BLD: 8 % (ref 5–13)
NEUTS SEG # BLD: 3.1 K/UL (ref 1.8–8)
NEUTS SEG NFR BLD: 56 % (ref 32–75)
NRBC # BLD: 0 K/UL (ref 0–0.01)
NRBC BLD-RTO: 0 PER 100 WBC
PLATELET # BLD AUTO: 260 K/UL (ref 150–400)
PMV BLD AUTO: 9.8 FL (ref 8.9–12.9)
POTASSIUM SERPL-SCNC: 4.4 MMOL/L (ref 3.5–5.1)
PROT SERPL-MCNC: 6.9 G/DL (ref 6.4–8.2)
RBC # BLD AUTO: 4.98 M/UL (ref 4.1–5.7)
SODIUM SERPL-SCNC: 139 MMOL/L (ref 136–145)
TRIGL SERPL-MCNC: 309 MG/DL
VLDLC SERPL CALC-MCNC: 61.8 MG/DL
WBC # BLD AUTO: 5.6 K/UL (ref 4.1–11.1)

## 2024-12-18 LAB
PSA FREE MFR SERPL: 26.4 %
PSA FREE SERPL-MCNC: 0.74 NG/ML
PSA SERPL-MCNC: 2.8 NG/ML (ref 0–4)

## 2024-12-19 NOTE — TELEPHONE ENCOUNTER
Pt called in states spoke with pharmacy & was advised PCP office needs to contact Cover My Meds for prior auth for     tretinoin (RETIN-A) 0.01 % gel     Please call patient with update

## 2025-04-04 RX ORDER — TRETINOIN 0.1 MG/G
GEL TOPICAL
Qty: 15 G | Refills: 1 | Status: SHIPPED | OUTPATIENT
Start: 2025-04-04

## 2025-06-16 ENCOUNTER — TELEPHONE (OUTPATIENT)
Age: 55
End: 2025-06-16

## 2025-06-16 NOTE — TELEPHONE ENCOUNTER
Pharmacy needs a pior auth:    Tretinoin (RETIN-A) 0.01 % gel    [id number] - [place], [place] - [address] - P [phone] - F [phone]

## 2025-07-27 DIAGNOSIS — L70.9 ACNE, UNSPECIFIED ACNE TYPE: Primary | ICD-10-CM

## 2025-07-28 RX ORDER — TRETINOIN 0.1 MG/G
GEL TOPICAL
Qty: 45 G | Refills: 1 | Status: SHIPPED | OUTPATIENT
Start: 2025-07-28 | End: 2025-07-29 | Stop reason: SDUPTHER

## 2025-07-29 DIAGNOSIS — L70.9 ACNE, UNSPECIFIED ACNE TYPE: ICD-10-CM

## 2025-07-29 RX ORDER — TRETINOIN 0.1 MG/G
GEL TOPICAL
Qty: 45 G | Refills: 1 | Status: SHIPPED | OUTPATIENT
Start: 2025-07-29

## 2025-07-29 NOTE — TELEPHONE ENCOUNTER
Future Appointments:  Future Appointments   Date Time Provider Department Center   12/22/2025  3:30 PM Jag Ward MD [facility]        Last Appointment With Me:  12/16/2024     Requested Prescriptions     Pending Prescriptions Disp Refills    tretinoin (RETIN-A) 0.01 % gel 45 g 1     Sig: APPLY TO AFFECTED AREA NIGHTLY

## 2025-08-06 ENCOUNTER — TELEPHONE (OUTPATIENT)
Age: 55
End: 2025-08-06

## 2025-08-19 ENCOUNTER — TELEPHONE (OUTPATIENT)
Age: 55
End: 2025-08-19

## 2025-08-30 ENCOUNTER — HOSPITAL ENCOUNTER (EMERGENCY)
Facility: HOSPITAL | Age: 55
Discharge: HOME | End: 2025-08-30
Payer: COMMERCIAL

## 2025-08-30 VITALS
BODY MASS INDEX: 32.12 KG/M2 | RESPIRATION RATE: 18 BRPM | TEMPERATURE: 97.9 F | WEIGHT: 193 LBS | OXYGEN SATURATION: 96 % | HEART RATE: 72 BPM | SYSTOLIC BLOOD PRESSURE: 134 MMHG | DIASTOLIC BLOOD PRESSURE: 87 MMHG

## 2025-08-30 DIAGNOSIS — T14.8XXA PUNCTURE WOUND: Primary | ICD-10-CM

## 2025-08-30 PROCEDURE — 90471 IMMUNIZATION ADMIN: CPT | Performed by: PHYSICIAN ASSISTANT

## 2025-08-30 PROCEDURE — 99284 EMERGENCY DEPT VISIT MOD MDM: CPT

## 2025-08-30 PROCEDURE — 6370000000 HC RX 637 (ALT 250 FOR IP): Performed by: PHYSICIAN ASSISTANT

## 2025-08-30 PROCEDURE — 6360000002 HC RX W HCPCS: Performed by: PHYSICIAN ASSISTANT

## 2025-08-30 PROCEDURE — 90715 TDAP VACCINE 7 YRS/> IM: CPT | Performed by: PHYSICIAN ASSISTANT

## 2025-08-30 RX ORDER — CEPHALEXIN 500 MG/1
500 CAPSULE ORAL 4 TIMES DAILY
Qty: 20 CAPSULE | Refills: 0 | Status: SHIPPED | OUTPATIENT
Start: 2025-08-30 | End: 2025-09-04

## 2025-08-30 RX ORDER — GINSENG 100 MG
CAPSULE ORAL
Status: COMPLETED | OUTPATIENT
Start: 2025-08-30 | End: 2025-08-30

## 2025-08-30 RX ADMIN — TETANUS TOXOID, REDUCED DIPHTHERIA TOXOID AND ACELLULAR PERTUSSIS VACCINE, ADSORBED 0.5 ML: 5; 2.5; 8; 8; 2.5 SUSPENSION INTRAMUSCULAR at 16:54

## 2025-08-30 RX ADMIN — BACITRACIN 1 PACKET: 500 OINTMENT TOPICAL at 16:54

## 2025-08-30 ASSESSMENT — LIFESTYLE VARIABLES
HOW OFTEN DO YOU HAVE A DRINK CONTAINING ALCOHOL: NEVER
HOW MANY STANDARD DRINKS CONTAINING ALCOHOL DO YOU HAVE ON A TYPICAL DAY: PATIENT DOES NOT DRINK

## (undated) DEVICE — HANDPIECE SET WITH COAXIAL HIGH FLOW TIP AND SUCTION TUBE: Brand: INTERPULSE

## (undated) DEVICE — BIPOLAR FORCEPS CORD: Brand: VALLEYLAB

## (undated) DEVICE — SUTURE VCRL SZ 0 L18IN ABSRB VLT L36MM CT-1 1/2 CIR J740D

## (undated) DEVICE — STERILE-Z SURGICAL PATIENT COVERS CLEAR POLYETHYLENE STERILE UNIVERSAL FIT 10 PER CASE: Brand: STERILE-Z

## (undated) DEVICE — TOOL 14MH30 LEGEND 14CM 3MM: Brand: MIDAS REX ™

## (undated) DEVICE — TROCAR LAP L100MM DIA5MM BLDELSS W/ STBL SL ENDOPATH XCEL

## (undated) DEVICE — MAGNETIC DRAPE: Brand: DEVON

## (undated) DEVICE — DRSG PATCH ANTIMIC 1INX4.0MM -- CONVERT TO ITEM 356053

## (undated) DEVICE — SUTURE SZ 0 27IN 5/8 CIR UR-6  TAPER PT VIOLET ABSRB VICRYL J603H

## (undated) DEVICE — SOLUTION IV 1000ML 0.9% SOD CHL

## (undated) DEVICE — ELECTRODE ES SHFT L29CM HK OD5MM ENDOPATH PRB + II

## (undated) DEVICE — SUTURE MCRYL SZ 4-0 L27IN ABSRB UD L19MM PS-2 1/2 CIR PRIM Y426H

## (undated) DEVICE — SET EXT W/2 NEEDLELESS Y-SITES 4-WAY STOPCOCK

## (undated) DEVICE — LIQUIBAND RAPID ADHESIVE 36/CS 0.8ML: Brand: MEDLINE

## (undated) DEVICE — HI-TORQUE VERSACORE FLOPPY GUIDE WIRE SYSTEM 145 CM: Brand: HI-TORQUE VERSACORE

## (undated) DEVICE — STERILE POLYISOPRENE POWDER-FREE SURGICAL GLOVES: Brand: PROTEXIS

## (undated) DEVICE — 3M™ TEGADERM™ TRANSPARENT FILM DRESSING FRAME STYLE, 1626W, 4 IN X 4-3/4 IN (10 CM X 12 CM), 50/CT 4CT/CASE: Brand: 3M™ TEGADERM™

## (undated) DEVICE — Device

## (undated) DEVICE — COVER,MAYO STAND,STERILE: Brand: MEDLINE

## (undated) DEVICE — LAMINECTOMY RICHMOND-LF: Brand: MEDLINE INDUSTRIES, INC.

## (undated) DEVICE — ELECTRODE BLDE L4IN NONINSULATED EDGE

## (undated) DEVICE — SYRINGE MED 20ML STD CLR PLAS LUERLOCK TIP N CTRL DISP

## (undated) DEVICE — DECANTER BAG 9": Brand: MEDLINE INDUSTRIES, INC.

## (undated) DEVICE — DRAPE,REIN 53X77,STERILE: Brand: MEDLINE

## (undated) DEVICE — RADIFOCUS OPTITORQUE ANGIOGRAPHIC CATHETER: Brand: OPTITORQUE

## (undated) DEVICE — CATHETER IV 14 GAX2 IN STR INTROCAN SAFETY

## (undated) DEVICE — SYR 10ML LUER LOK 1/5ML GRAD --

## (undated) DEVICE — TR BAND RADIAL ARTERY COMPRESSION DEVICE: Brand: TR BAND

## (undated) DEVICE — Device: Brand: PROWATER

## (undated) DEVICE — TUBING IRRIG L77IN DIA0.241IN L BOR FOR CYSTO W/ NVENT

## (undated) DEVICE — STRIP,CLOSURE,WOUND,MEDI-STRIP,1/2X4: Brand: MEDLINE

## (undated) DEVICE — ANGIOGRAPHY KIT CUST [K0910930B] [MERIT MEDICAL SYSTEMS INC]

## (undated) DEVICE — C-ARM: Brand: UNBRANDED

## (undated) DEVICE — CATHETER CHOLGM 4.5FR L18IN W/ MTL SUPP TB

## (undated) DEVICE — HANDLE LT SNAP ON ULT DURABLE LENS FOR TRUMPF ALC DISPOSABLE

## (undated) DEVICE — WRAP FOAM EC BK W 1 ICE PK

## (undated) DEVICE — FLOSEAL MATRIX IS INDICATED IN SURGICAL PROCEDURES (OTHER THAN IN OPHTHALMIC) AS AN ADJUNCT TO HEMOSTASIS WHEN CONTROL OF BLEEDING BY LIGATURE OR CONVENTIONALPROCEDURES IS INEFFECTIVE OR IMPRACTICAL.: Brand: FLOSEAL HEMOSTATIC MATRIX

## (undated) DEVICE — INFECTION CONTROL KIT SYS

## (undated) DEVICE — KIT JACK TBL PT CARE

## (undated) DEVICE — SYRINGE ANGIO 10 CC BRL STD PRNT POLYCARB LT BLU MEDALLION

## (undated) DEVICE — LAPAROSCOPIC TROCAR SLEEVE/SINGLE USE: Brand: KII® OPTICAL ACCESS SYSTEM

## (undated) DEVICE — SUTURE VCRL SZ 2-0 L18IN ABSRB UD L26MM CP-2 1/2 CIR REV J762D

## (undated) DEVICE — CATH ANGI BLLN DIL 4.0X15MM -- NC EUPHORA

## (undated) DEVICE — PREP SKN PREVAIL 40ML APPL --

## (undated) DEVICE — HANDLE PRB DIA5MM HND CTRL PSTL GRP ENDOPATH PRB + II

## (undated) DEVICE — 1010 S-DRAPE TOWEL DRAPE 10/BX: Brand: STERI-DRAPE™

## (undated) DEVICE — SPHERE STEALTH 12PK/TY --

## (undated) DEVICE — TUBING PRSS MON L6IN PVC M FEM CONN

## (undated) DEVICE — MEDI-TRACE CADENCE ADULT, DEFIBRILLATION ELECTRODE -RTS  (10 PR/PK) - PHILIPS: Brand: MEDI-TRACE CADENCE

## (undated) DEVICE — SOLUTION IV 500ML 0.9% SOD CHL PH 5 INJ USP VIAFLX PLAS

## (undated) DEVICE — SYR POWER 150ML 8IN FILL TUBE --

## (undated) DEVICE — PACK PROCEDURE SURG HRT CATH

## (undated) DEVICE — GUIDEWIRE VASC L260CM 0.035IN J TIP L3MM PTFE FIX COR NAMIC

## (undated) DEVICE — APPLIER CLP M/L SHFT DIA5MM 15 LIG LIGAMAX 5

## (undated) DEVICE — TUBING, SUCTION, 1/4" X 10', STRAIGHT: Brand: MEDLINE

## (undated) DEVICE — DRAPE PRB US TRNSDCR 6X96IN --

## (undated) DEVICE — KIT,1200CC CANISTER,3/16"X6' TUBING: Brand: MEDLINE INDUSTRIES, INC.

## (undated) DEVICE — NEEDLE HYPO 22GA L1.5IN BLK S STL HUB POLYPR SHLD REG BVL

## (undated) DEVICE — CATHETERIZATION TRAY FOL 14 FR URIMTR STATLOK SURSTP

## (undated) DEVICE — CATH GUID COR EB35 6FR 100CM -- LAUNCHER

## (undated) DEVICE — 1000ML,PRESSURE INFUSER W/STOPCOCK: Brand: MEDLINE

## (undated) DEVICE — BONE WAX WHITE: Brand: BONE WAX WHITE

## (undated) DEVICE — COPILOT BLEEDBACK CONTROL VALVE: Brand: COPILOT

## (undated) DEVICE — GENERAL LAPAROSCOPY-MRMC: Brand: MEDLINE INDUSTRIES, INC.

## (undated) DEVICE — CATH GUID COR JR4.0 6FR 100CM -- LAUNCHER

## (undated) DEVICE — GLIDESHEATH SLENDER ACCESS KIT: Brand: GLIDESHEATH SLENDER

## (undated) DEVICE — COVER,TABLE,60X90,STERILE: Brand: MEDLINE

## (undated) DEVICE — SOLUTION IRRIG 1000ML H2O STRL BLT

## (undated) DEVICE — SPLINT WR POS F/ARTERIAL ACC -- BX/10

## (undated) DEVICE — KENDALL SCD EXPRESS SLEEVES, KNEE LENGTH, MEDIUM: Brand: KENDALL SCD

## (undated) DEVICE — SOLUTION IV 1000ML 0.9% SOD CHL PH 5 INJ USP VIAFLX PLAS

## (undated) DEVICE — HYPODERMIC SAFETY NEEDLE: Brand: MAGELLAN

## (undated) DEVICE — WATERPROOF, BACTERIA PROOF DRESSING WITH ABSORBENT SEE THROUGH PAD: Brand: OPSITE POST-OP VISIBLE 20X10CM CTN 20

## (undated) DEVICE — UNDERGLOVE SURG SZ 8 FNGR THK0.21MIL GRN LTX BEAD CUF

## (undated) DEVICE — SLIM BODY SKIN STAPLER: Brand: APPOSE ULC

## (undated) DEVICE — GUIDEWIRE VASC L175CM POLYMER HYDRPHLC FLAT COWIRE DSGN

## (undated) DEVICE — SYRINGE MED 30ML STD CLR PLAS LUERLOCK TIP N CTRL DISP

## (undated) DEVICE — MINI TREK CORONARY DILATATION CATHETER 2.0 MM X 20 MM / RAPID-EXCHANGE: Brand: MINI TREK

## (undated) DEVICE — 3000CC GUARDIAN II: Brand: GUARDIAN

## (undated) DEVICE — SOLUTION ANTIFOG VIS SYS CLEARIFY LAPSCP

## (undated) DEVICE — SURGIFOAM SPNG SZ 100

## (undated) DEVICE — TOWEL,OR,DSP,ST,BLUE,STD,2/PK,40PK/CS: Brand: MEDLINE

## (undated) DEVICE — CATHETER ETER ANGIO L110CM OD5FR ID046IN L75CM 038IN 145DEG CARD

## (undated) DEVICE — CATH ANGI BLLN DIL 3.5X12MM -- NC EUPHORA

## (undated) DEVICE — DRAIN KT WND 10FR RND 400ML --

## (undated) DEVICE — CONTAINER,SPECIMEN,OR STERILE,4OZ: Brand: MEDLINE